# Patient Record
Sex: FEMALE | Race: WHITE | Employment: OTHER | URBAN - METROPOLITAN AREA
[De-identification: names, ages, dates, MRNs, and addresses within clinical notes are randomized per-mention and may not be internally consistent; named-entity substitution may affect disease eponyms.]

---

## 2017-06-03 ENCOUNTER — HOSPITAL ENCOUNTER (OUTPATIENT)
Dept: GENERAL RADIOLOGY | Age: 67
Discharge: HOME | End: 2017-06-03
Attending: INTERNAL MEDICINE | Admitting: INTERNAL MEDICINE

## 2017-06-03 DIAGNOSIS — R13.10 DYSPHAGIA, UNSPECIFIED TYPE: ICD-10-CM

## 2017-06-03 DIAGNOSIS — R10.84 ABDOMINAL PAIN, GENERALIZED: ICD-10-CM

## 2017-06-03 DIAGNOSIS — R13.14 DYSPHAGIA, PHARYNGOESOPHAGEAL PHASE: ICD-10-CM

## 2018-04-13 ENCOUNTER — HOSPITAL ENCOUNTER (OUTPATIENT)
Dept: ENDOSCOPY | Age: 68
Discharge: HOME | End: 2018-04-04
Attending: INTERNAL MEDICINE | Admitting: INTERNAL MEDICINE

## 2018-04-20 PROBLEM — I21.29 ACUTE MI, TRUE POSTERIOR WALL (HCC): Status: ACTIVE | Noted: 2018-04-20

## 2018-04-20 PROBLEM — I25.110 CORONARY ARTERY DISEASE INVOLVING NATIVE CORONARY ARTERY OF NATIVE HEART WITH UNSTABLE ANGINA PECTORIS (HCC): Status: ACTIVE | Noted: 2018-04-20

## 2018-04-20 PROBLEM — I21.29 POSTERIOR MI (HCC): Status: ACTIVE | Noted: 2018-04-20

## 2018-05-01 ENCOUNTER — OFFICE VISIT (OUTPATIENT)
Dept: CARDIOLOGY CLINIC | Age: 68
End: 2018-05-01

## 2018-05-01 VITALS
HEART RATE: 66 BPM | HEIGHT: 64 IN | WEIGHT: 135 LBS | DIASTOLIC BLOOD PRESSURE: 70 MMHG | SYSTOLIC BLOOD PRESSURE: 150 MMHG | BODY MASS INDEX: 23.05 KG/M2

## 2018-05-01 DIAGNOSIS — I10 ESSENTIAL HYPERTENSION: ICD-10-CM

## 2018-05-01 DIAGNOSIS — I25.111 CORONARY ARTERY DISEASE INVOLVING NATIVE CORONARY ARTERY OF NATIVE HEART WITH ANGINA PECTORIS WITH DOCUMENTED SPASM (HCC): Primary | ICD-10-CM

## 2018-05-01 DIAGNOSIS — I21.29: ICD-10-CM

## 2018-05-01 DIAGNOSIS — I25.110 CORONARY ARTERY DISEASE INVOLVING NATIVE CORONARY ARTERY OF NATIVE HEART WITH UNSTABLE ANGINA PECTORIS (HCC): ICD-10-CM

## 2018-05-01 PROCEDURE — 4040F PNEUMOC VAC/ADMIN/RCVD: CPT | Performed by: NURSE PRACTITIONER

## 2018-05-01 PROCEDURE — 1036F TOBACCO NON-USER: CPT | Performed by: NURSE PRACTITIONER

## 2018-05-01 PROCEDURE — 3017F COLORECTAL CA SCREEN DOC REV: CPT | Performed by: NURSE PRACTITIONER

## 2018-05-01 PROCEDURE — 1123F ACP DISCUSS/DSCN MKR DOCD: CPT | Performed by: NURSE PRACTITIONER

## 2018-05-01 PROCEDURE — 1111F DSCHRG MED/CURRENT MED MERGE: CPT | Performed by: NURSE PRACTITIONER

## 2018-05-01 PROCEDURE — G8427 DOCREV CUR MEDS BY ELIG CLIN: HCPCS | Performed by: NURSE PRACTITIONER

## 2018-05-01 PROCEDURE — G8598 ASA/ANTIPLAT THER USED: HCPCS | Performed by: NURSE PRACTITIONER

## 2018-05-01 PROCEDURE — G8420 CALC BMI NORM PARAMETERS: HCPCS | Performed by: NURSE PRACTITIONER

## 2018-05-01 PROCEDURE — 99214 OFFICE O/P EST MOD 30 MIN: CPT | Performed by: NURSE PRACTITIONER

## 2018-05-01 PROCEDURE — 1090F PRES/ABSN URINE INCON ASSESS: CPT | Performed by: NURSE PRACTITIONER

## 2018-05-01 PROCEDURE — G8399 PT W/DXA RESULTS DOCUMENT: HCPCS | Performed by: NURSE PRACTITIONER

## 2018-05-10 ENCOUNTER — HOSPITAL ENCOUNTER (OUTPATIENT)
Dept: NON INVASIVE DIAGNOSTICS | Age: 68
Discharge: HOME | End: 2018-05-10
Attending: NURSE PRACTITIONER | Admitting: NURSE PRACTITIONER

## 2018-05-10 DIAGNOSIS — I25.111 ATHEROSCLEROTIC HEART DISEASE OF NATIVE CORONARY ARTERY WITH ANGINA PECTORIS WITH DOCUMENTED SPASM (HCC): ICD-10-CM

## 2018-06-01 ENCOUNTER — HOSPITAL ENCOUNTER (OUTPATIENT)
Dept: OTHER | Age: 68
Discharge: HOME | End: 2018-06-30
Attending: INTERNAL MEDICINE | Admitting: INTERNAL MEDICINE

## 2018-06-06 ENCOUNTER — TELEPHONE (OUTPATIENT)
Dept: CARDIOLOGY CLINIC | Age: 68
End: 2018-06-06

## 2018-07-01 ENCOUNTER — HOSPITAL ENCOUNTER (OUTPATIENT)
Dept: OTHER | Age: 68
Discharge: HOME | End: 2018-07-31
Attending: INTERNAL MEDICINE | Admitting: INTERNAL MEDICINE

## 2018-08-01 ENCOUNTER — HOSPITAL ENCOUNTER (OUTPATIENT)
Dept: OTHER | Age: 68
Discharge: HOME | End: 2018-08-31
Attending: INTERNAL MEDICINE | Admitting: INTERNAL MEDICINE

## 2018-08-08 LAB
CREATININE, URINE: NORMAL
MICROALBUMIN/CREAT 24H UR: 7 MG/G{CREAT}
MICROALBUMIN/CREAT UR-RTO: NORMAL

## 2018-08-17 ENCOUNTER — OFFICE VISIT (OUTPATIENT)
Dept: CARDIOLOGY CLINIC | Age: 68
End: 2018-08-17

## 2018-08-17 VITALS
OXYGEN SATURATION: 98 % | RESPIRATION RATE: 14 BRPM | SYSTOLIC BLOOD PRESSURE: 136 MMHG | HEIGHT: 64 IN | DIASTOLIC BLOOD PRESSURE: 74 MMHG | HEART RATE: 76 BPM | WEIGHT: 134 LBS | BODY MASS INDEX: 22.88 KG/M2

## 2018-08-17 DIAGNOSIS — I10 ESSENTIAL HYPERTENSION: ICD-10-CM

## 2018-08-17 DIAGNOSIS — E78.49 OTHER HYPERLIPIDEMIA: ICD-10-CM

## 2018-08-17 DIAGNOSIS — I25.110 CORONARY ARTERY DISEASE INVOLVING NATIVE CORONARY ARTERY OF NATIVE HEART WITH UNSTABLE ANGINA PECTORIS (HCC): Primary | ICD-10-CM

## 2018-08-17 PROCEDURE — G8598 ASA/ANTIPLAT THER USED: HCPCS | Performed by: INTERNAL MEDICINE

## 2018-08-17 PROCEDURE — 1036F TOBACCO NON-USER: CPT | Performed by: INTERNAL MEDICINE

## 2018-08-17 PROCEDURE — G8427 DOCREV CUR MEDS BY ELIG CLIN: HCPCS | Performed by: INTERNAL MEDICINE

## 2018-08-17 PROCEDURE — 3017F COLORECTAL CA SCREEN DOC REV: CPT | Performed by: INTERNAL MEDICINE

## 2018-08-17 PROCEDURE — 1090F PRES/ABSN URINE INCON ASSESS: CPT | Performed by: INTERNAL MEDICINE

## 2018-08-17 PROCEDURE — 4040F PNEUMOC VAC/ADMIN/RCVD: CPT | Performed by: INTERNAL MEDICINE

## 2018-08-17 PROCEDURE — 1101F PT FALLS ASSESS-DOCD LE1/YR: CPT | Performed by: INTERNAL MEDICINE

## 2018-08-17 PROCEDURE — G8420 CALC BMI NORM PARAMETERS: HCPCS | Performed by: INTERNAL MEDICINE

## 2018-08-17 PROCEDURE — G8399 PT W/DXA RESULTS DOCUMENT: HCPCS | Performed by: INTERNAL MEDICINE

## 2018-08-17 PROCEDURE — 1123F ACP DISCUSS/DSCN MKR DOCD: CPT | Performed by: INTERNAL MEDICINE

## 2018-08-17 PROCEDURE — 99214 OFFICE O/P EST MOD 30 MIN: CPT | Performed by: INTERNAL MEDICINE

## 2018-08-17 RX ORDER — METFORMIN HYDROCHLORIDE 500 MG/1
1000 TABLET, EXTENDED RELEASE ORAL 2 TIMES DAILY
COMMUNITY
Start: 2018-07-10 | End: 2020-10-14 | Stop reason: SDUPTHER

## 2018-08-17 RX ORDER — LISINOPRIL 40 MG/1
TABLET ORAL
COMMUNITY
Start: 2018-08-08 | End: 2018-08-17 | Stop reason: SDUPTHER

## 2018-08-17 RX ORDER — LISINOPRIL 20 MG/1
20 TABLET ORAL 2 TIMES DAILY
Qty: 180 TABLET | Refills: 3 | Status: SHIPPED
Start: 2018-08-17 | End: 2020-08-07 | Stop reason: DRUGHIGH

## 2018-08-17 NOTE — PROGRESS NOTES
Lab Results   Component Value Date    HDL 35 (L) 04/21/2018    HDL 39 08/12/2013    HDL 49 09/27/2012     Lab Results   Component Value Date    LDLCALC see below 04/21/2018    [address] 09/27/2012    LDLCALC 70 09/27/2011     Lab Results   Component Value Date    LABVLDL see below 04/21/2018    LABVLDL 54 09/27/2012    LABVLDL 44 09/27/2011     Radiology Review:  Pertinent images / reports were reviewed as a part of this visit and reveals the following:           Plan:   Cardiac rehab referral  No change in medications  OV DR. Cuello 3 months  PL GXT end of next week        Further evaluation will be based upon the patient's clinical course and testing results. All questions and concerns were addressed to the patient/family. Alternatives to  treatment were discussed. The patient  currently  is not smoking. The risks related to smoking were reviewed with the patient. Recommend maintaining a smoke-free lifestyle. Products available for smoking cessation were discussed. Angiotension inhibitor/angiotension receptor blocker has __ been prescribed / recommended for congestive heart failure. Daily weight, low sodium diet were discussed. Patient instructed to call the office with a weight gain: > 3 # over night or 5# in one week; swelling, SOB/orthopnea/PND    Dual Antiplatelet therapy / anti-coagulation has been recommended / prescribed for this patient. Education conducted on adverse reactions including bleeding was discussed. The patient verbalizes understanding. Pt is not on a BB (she can NOT take bad dreams)  Pt is on an ace-i/ARB  Pt is on a statin      Saturated fat diet discussed  Exercise program discussed    Thank you for allowing to us to participate in the care of [address]  Documentation of today's visit sent to PCP

## 2018-08-17 NOTE — PROGRESS NOTES
[address]   Cardiac f/up    Referring Provider:  Noel Kirkpatrick     Chief Complaint   Patient presents with    3 Month Follow-Up    Coronary Artery Disease     Cardiac phase II 08/7/2018    Hypertension    Hyperlipidemia      History of Present Illness:  Mrs. Julio Baird is here today for follow up s/p acute posterior wall MI April 2018 (progressive exertional chest discomfort). LHC showed 100% OM1 which was stented. She is on Crestor for marked hyperlipidemia, has family history of premature heart disease. Today, she states she feels pretty well, getting her strength back slowly. She has occasional \"twinges\" in her chest that are brief, does not need to take NTG. She denies exertional chest pain, NULL/PND, palpitations, light-headedness, edema. She is active in [address] [place] [address], takes walks, does not smoke. Her  is with her for the visit.     Past Medical History:   has a past medical history of Allergic rhinitis; Fibromyalgia; GERD (gastroesophageal reflux disease); Herniated disc; Hyperlipidemia; Hypertension; and Type II or unspecified type diabetes mellitus without mention of complication, not stated as uncontrolled. Surgical History:   has no past surgical history on file. Social History:   reports that she has never smoked. She has never used smokeless tobacco. She reports that she does not drink alcohol or use drugs. Family History:  family history is not on file. Home Medications:  Prior to Admission medications    Medication Sig Start Date End Date Taking?  Authorizing Provider   metFORMIN (GLUCOPHAGE-XR) 500 MG extended release tablet  7/10/18  Yes Historical Provider, MD   lisinopril (PRINIVIL;ZESTRIL) 40 MG tablet  8/8/18  Yes Historical Provider, MD   aspirin 81 MG chewable tablet Take 1 tablet by mouth daily 4/22/18  Yes YAYA Arias CNP   rosuvastatin (CRESTOR) 10 MG tablet Take 1 tablet by mouth nightly 4/22/18  Yes YAYA Arias CNP nitroGLYCERIN (NITROSTAT) 0.4 MG SL tablet Place 1 tablet under the tongue every 5 minutes as needed for Chest pain 4/22/18  Yes YAYA Jackson CNP   clopidogrel (PLAVIX) 75 MG tablet Take 1 tablet by mouth daily 4/22/18  Yes YAYA Jackson CNP   diltiazem (TIAZAC) 360 MG SR capsule TAKE 1 CAPSULE BY MOUTH ONE TIME A DAY 12/24/13  Yes Mae Arriaga MD      Allergies:  Gill Clipper arthritis strength-antacid [aspirin buff, al hyd-mg hyd]; Aspirin; Atorvastatin; Erythromycin base; Macrolides and ketolides; Sulfa antibiotics; Troleandomycin; and Verapamil     Review of Systems:   · Constitutional: there has been no unanticipated weight loss. There's been no change in energy level, sleep pattern, or activity level. · Eyes: No visual changes or diplopia. No scleral icterus. · ENT: No Headaches, hearing loss or vertigo. No mouth sores or sore throat. · Cardiovascular: Reviewed in HPI  · Respiratory: No cough or wheezing, no sputum production. No hematemesis. · Gastrointestinal: No abdominal pain, appetite loss, blood in stools. No change in bowel or bladder habits. · Genitourinary: No dysuria, trouble voiding, or hematuria. · Musculoskeletal:  No gait disturbance, weakness or joint complaints. · Integumentary: No rash or pruritis. · Neurological: No headache, diplopia, change in muscle strength, numbness or tingling. No change in gait, balance, coordination, mood, affect, memory, mentation, behavior. · Psychiatric: No anxiety, no depression. · Endocrine: No malaise, fatigue or temperature intolerance. No excessive thirst, fluid intake, or urination. No tremor. · Hematologic/Lymphatic: No abnormal bruising or bleeding, blood clots or swollen lymph nodes. · Allergic/Immunologic: No nasal congestion or hives.     Physical Examination:    Vitals:    08/17/18 0824   BP: 136/74   Pulse: 76   Resp: 14   SpO2: 98%        Constitutional and General Appearance: Appears stated age, NAD Skin:good turgor,intact without lesions  HEENT: EOMI ,normal  Neck:no JVD    Respiratory:  · Normal excursion and expansion without use of accessory muscles  · Resp Auscultation: Normal breath sounds without dullness  Cardiovascular:  · The apical impulses not displaced  · Heart tones are crisp and normal  · Cervical veins are not engorged  · The carotid upstroke is normal in amplitude and contour without delay or bruit  · Peripheral pulses are symmetrical and full  · There is no clubbing, cyanosis of the extremities. · No edema  · Femoral Arteries: 2+ and equal  · Pedal Pulses: 2+ and equal   Abdomen:  · No masses or tenderness  · Liver/Spleen: No Abnormalities Noted  Neurological/Psychiatric:  · Alert and oriented in all spheres  · Moves all extremities well  · Exhibits normal gait balance and coordination  · No abnormalities of mood, affect, memory, mentation, or behavior are noted    Angiogram 4/20/18  Acute inferior posterior wall MI  Cath with 40-50% mid RCA,100% OM1,EF 50% with mild inferior wall hypokinesis. Trop 2.99.     PCI with 2.5 x 15 balloon then 2.5 x 18 manasa SHARIF stent to 16 hitesh with excellent angiographic result. Small distal branch with embolization. Will continue aaggrastat x 12 hours. Plavix given. She has been on crestor for lipid management,need to give in house     Assessment:     CAD (posterior wall MI April 2018)  [facility]  Attending ROSELYN, doing well  Family history of premature heart disease. Hypertension  Stable. Recently had Lisinopril doubled per PCP to 40mg qd. Would recommend taking it 20mg bid. Blood pressure 136/74, pulse 76, resp. rate 14, height 5' 4\" (1.626 m), weight 134 lb (60.8 kg), SpO2 98 %. Hyperlipidemia  Takes Crestor 10mg  4/21/18> , , HDL 35, LDL not calculated. Watch carbs/sugars. Carotid dopplers 6/14/18 (Care Everywhere)> less than 50% mary  Abd u/s 6/3/17> Negative for aneurysm          Plan:  Mrs. Stacey Bryson has a stable

## 2018-09-01 ENCOUNTER — HOSPITAL ENCOUNTER (OUTPATIENT)
Dept: OTHER | Age: 68
Discharge: HOME | End: 2018-09-01
Attending: INTERNAL MEDICINE | Admitting: INTERNAL MEDICINE

## 2019-01-08 RX ORDER — CLOPIDOGREL BISULFATE 75 MG/1
75 TABLET ORAL DAILY
Qty: 90 TABLET | Refills: 1 | Status: SHIPPED | OUTPATIENT
Start: 2019-01-08 | End: 2019-02-13 | Stop reason: SDUPTHER

## 2019-01-24 ENCOUNTER — TELEPHONE (OUTPATIENT)
Dept: CARDIOLOGY CLINIC | Age: 69
End: 2019-01-24

## 2019-01-24 ENCOUNTER — HOSPITAL ENCOUNTER (OUTPATIENT)
Age: 69
Discharge: HOME | End: 2019-01-24
Payer: MEDICARE

## 2019-01-24 ENCOUNTER — OFFICE VISIT (OUTPATIENT)
Dept: CARDIOLOGY CLINIC | Age: 69
End: 2019-01-24
Payer: MEDICARE

## 2019-01-24 VITALS
HEIGHT: 64 IN | BODY MASS INDEX: 23.39 KG/M2 | HEART RATE: 93 BPM | WEIGHT: 137 LBS | DIASTOLIC BLOOD PRESSURE: 74 MMHG | SYSTOLIC BLOOD PRESSURE: 140 MMHG

## 2019-01-24 DIAGNOSIS — I25.110 CORONARY ARTERY DISEASE INVOLVING NATIVE CORONARY ARTERY OF NATIVE HEART WITH UNSTABLE ANGINA PECTORIS (HCC): Primary | ICD-10-CM

## 2019-01-24 DIAGNOSIS — I25.110 CORONARY ARTERY DISEASE INVOLVING NATIVE CORONARY ARTERY OF NATIVE HEART WITH UNSTABLE ANGINA PECTORIS (HCC): ICD-10-CM

## 2019-01-24 DIAGNOSIS — I10 ESSENTIAL HYPERTENSION: ICD-10-CM

## 2019-01-24 DIAGNOSIS — E78.49 OTHER HYPERLIPIDEMIA: ICD-10-CM

## 2019-01-24 LAB — TROPONIN: <0.01 NG/ML

## 2019-01-24 PROCEDURE — 1036F TOBACCO NON-USER: CPT | Performed by: NURSE PRACTITIONER

## 2019-01-24 PROCEDURE — G8427 DOCREV CUR MEDS BY ELIG CLIN: HCPCS | Performed by: NURSE PRACTITIONER

## 2019-01-24 PROCEDURE — 93000 ELECTROCARDIOGRAM COMPLETE: CPT | Performed by: NURSE PRACTITIONER

## 2019-01-24 PROCEDURE — 1090F PRES/ABSN URINE INCON ASSESS: CPT | Performed by: NURSE PRACTITIONER

## 2019-01-24 PROCEDURE — G8420 CALC BMI NORM PARAMETERS: HCPCS | Performed by: NURSE PRACTITIONER

## 2019-01-24 PROCEDURE — G8484 FLU IMMUNIZE NO ADMIN: HCPCS | Performed by: NURSE PRACTITIONER

## 2019-01-24 PROCEDURE — G8399 PT W/DXA RESULTS DOCUMENT: HCPCS | Performed by: NURSE PRACTITIONER

## 2019-01-24 PROCEDURE — 1101F PT FALLS ASSESS-DOCD LE1/YR: CPT | Performed by: NURSE PRACTITIONER

## 2019-01-24 PROCEDURE — 84484 ASSAY OF TROPONIN QUANT: CPT

## 2019-01-24 PROCEDURE — 99214 OFFICE O/P EST MOD 30 MIN: CPT | Performed by: NURSE PRACTITIONER

## 2019-01-24 PROCEDURE — 4040F PNEUMOC VAC/ADMIN/RCVD: CPT | Performed by: NURSE PRACTITIONER

## 2019-01-24 PROCEDURE — 1123F ACP DISCUSS/DSCN MKR DOCD: CPT | Performed by: NURSE PRACTITIONER

## 2019-01-24 PROCEDURE — 3017F COLORECTAL CA SCREEN DOC REV: CPT | Performed by: NURSE PRACTITIONER

## 2019-01-24 PROCEDURE — 36415 COLL VENOUS BLD VENIPUNCTURE: CPT

## 2019-01-24 PROCEDURE — G8598 ASA/ANTIPLAT THER USED: HCPCS | Performed by: NURSE PRACTITIONER

## 2019-01-25 ENCOUNTER — TELEPHONE (OUTPATIENT)
Dept: CARDIOLOGY CLINIC | Age: 69
End: 2019-01-25

## 2019-02-13 ENCOUNTER — OFFICE VISIT (OUTPATIENT)
Dept: CARDIOLOGY CLINIC | Age: 69
End: 2019-02-13
Payer: MEDICARE

## 2019-02-13 VITALS
HEIGHT: 64 IN | SYSTOLIC BLOOD PRESSURE: 158 MMHG | BODY MASS INDEX: 23.56 KG/M2 | WEIGHT: 138 LBS | DIASTOLIC BLOOD PRESSURE: 82 MMHG | HEART RATE: 88 BPM

## 2019-02-13 DIAGNOSIS — I25.110 CORONARY ARTERY DISEASE INVOLVING NATIVE CORONARY ARTERY OF NATIVE HEART WITH UNSTABLE ANGINA PECTORIS (HCC): Primary | ICD-10-CM

## 2019-02-13 DIAGNOSIS — E78.49 OTHER HYPERLIPIDEMIA: ICD-10-CM

## 2019-02-13 DIAGNOSIS — I10 ESSENTIAL HYPERTENSION: ICD-10-CM

## 2019-02-13 DIAGNOSIS — E11.9 TYPE 2 DIABETES MELLITUS WITHOUT COMPLICATION, WITHOUT LONG-TERM CURRENT USE OF INSULIN (HCC): ICD-10-CM

## 2019-02-13 PROCEDURE — 1090F PRES/ABSN URINE INCON ASSESS: CPT | Performed by: INTERNAL MEDICINE

## 2019-02-13 PROCEDURE — 1123F ACP DISCUSS/DSCN MKR DOCD: CPT | Performed by: INTERNAL MEDICINE

## 2019-02-13 PROCEDURE — 4040F PNEUMOC VAC/ADMIN/RCVD: CPT | Performed by: INTERNAL MEDICINE

## 2019-02-13 PROCEDURE — 1036F TOBACCO NON-USER: CPT | Performed by: INTERNAL MEDICINE

## 2019-02-13 PROCEDURE — 2022F DILAT RTA XM EVC RTNOPTHY: CPT | Performed by: INTERNAL MEDICINE

## 2019-02-13 PROCEDURE — 1101F PT FALLS ASSESS-DOCD LE1/YR: CPT | Performed by: INTERNAL MEDICINE

## 2019-02-13 PROCEDURE — 3046F HEMOGLOBIN A1C LEVEL >9.0%: CPT | Performed by: INTERNAL MEDICINE

## 2019-02-13 PROCEDURE — G8598 ASA/ANTIPLAT THER USED: HCPCS | Performed by: INTERNAL MEDICINE

## 2019-02-13 PROCEDURE — 99214 OFFICE O/P EST MOD 30 MIN: CPT | Performed by: INTERNAL MEDICINE

## 2019-02-13 PROCEDURE — G8427 DOCREV CUR MEDS BY ELIG CLIN: HCPCS | Performed by: INTERNAL MEDICINE

## 2019-02-13 PROCEDURE — G8420 CALC BMI NORM PARAMETERS: HCPCS | Performed by: INTERNAL MEDICINE

## 2019-02-13 PROCEDURE — G8399 PT W/DXA RESULTS DOCUMENT: HCPCS | Performed by: INTERNAL MEDICINE

## 2019-02-13 PROCEDURE — 3017F COLORECTAL CA SCREEN DOC REV: CPT | Performed by: INTERNAL MEDICINE

## 2019-02-13 PROCEDURE — G8484 FLU IMMUNIZE NO ADMIN: HCPCS | Performed by: INTERNAL MEDICINE

## 2019-02-13 RX ORDER — CLOPIDOGREL BISULFATE 75 MG/1
75 TABLET ORAL DAILY
Qty: 90 TABLET | Refills: 3 | Status: SHIPPED | OUTPATIENT
Start: 2019-02-13 | End: 2020-03-10 | Stop reason: ALTCHOICE

## 2019-03-04 ENCOUNTER — HOSPITAL ENCOUNTER (OUTPATIENT)
Dept: NON INVASIVE DIAGNOSTICS | Age: 69
Discharge: HOME | End: 2019-03-04
Payer: MEDICARE

## 2019-03-04 DIAGNOSIS — I25.110 CORONARY ARTERY DISEASE INVOLVING NATIVE CORONARY ARTERY OF NATIVE HEART WITH UNSTABLE ANGINA PECTORIS (HCC): ICD-10-CM

## 2019-03-04 LAB
LV EF: 71 %
LVEF MODALITY: NORMAL

## 2019-03-04 PROCEDURE — 78452 HT MUSCLE IMAGE SPECT MULT: CPT | Performed by: INTERNAL MEDICINE

## 2019-03-04 PROCEDURE — 3430000000 HC RX DIAGNOSTIC RADIOPHARMACEUTICAL: Performed by: INTERNAL MEDICINE

## 2019-03-04 PROCEDURE — 93017 CV STRESS TEST TRACING ONLY: CPT | Performed by: INTERNAL MEDICINE

## 2019-03-04 PROCEDURE — A9502 TC99M TETROFOSMIN: HCPCS | Performed by: INTERNAL MEDICINE

## 2019-03-04 RX ADMIN — TETROFOSMIN 10 MILLICURIE: 0.23 INJECTION, POWDER, LYOPHILIZED, FOR SOLUTION INTRAVENOUS at 08:44

## 2019-03-04 RX ADMIN — TETROFOSMIN 30 MILLICURIE: 0.23 INJECTION, POWDER, LYOPHILIZED, FOR SOLUTION INTRAVENOUS at 10:05

## 2019-03-12 ENCOUNTER — TELEPHONE (OUTPATIENT)
Dept: CARDIOLOGY CLINIC | Age: 69
End: 2019-03-12

## 2019-04-15 ENCOUNTER — OFFICE VISIT (OUTPATIENT)
Dept: ENDOCRINOLOGY | Age: 69
End: 2019-04-15
Payer: MEDICARE

## 2019-04-15 VITALS
SYSTOLIC BLOOD PRESSURE: 133 MMHG | HEART RATE: 86 BPM | WEIGHT: 135.4 LBS | BODY MASS INDEX: 23.12 KG/M2 | DIASTOLIC BLOOD PRESSURE: 73 MMHG | HEIGHT: 64 IN

## 2019-04-15 DIAGNOSIS — E78.2 MIXED HYPERLIPIDEMIA: ICD-10-CM

## 2019-04-15 DIAGNOSIS — I25.110 CORONARY ARTERY DISEASE INVOLVING NATIVE CORONARY ARTERY OF NATIVE HEART WITH UNSTABLE ANGINA PECTORIS (HCC): ICD-10-CM

## 2019-04-15 DIAGNOSIS — I10 ESSENTIAL HYPERTENSION: ICD-10-CM

## 2019-04-15 PROCEDURE — 3017F COLORECTAL CA SCREEN DOC REV: CPT | Performed by: INTERNAL MEDICINE

## 2019-04-15 PROCEDURE — G8399 PT W/DXA RESULTS DOCUMENT: HCPCS | Performed by: INTERNAL MEDICINE

## 2019-04-15 PROCEDURE — 1036F TOBACCO NON-USER: CPT | Performed by: INTERNAL MEDICINE

## 2019-04-15 PROCEDURE — 3046F HEMOGLOBIN A1C LEVEL >9.0%: CPT | Performed by: INTERNAL MEDICINE

## 2019-04-15 PROCEDURE — G8420 CALC BMI NORM PARAMETERS: HCPCS | Performed by: INTERNAL MEDICINE

## 2019-04-15 PROCEDURE — 2022F DILAT RTA XM EVC RTNOPTHY: CPT | Performed by: INTERNAL MEDICINE

## 2019-04-15 PROCEDURE — 1123F ACP DISCUSS/DSCN MKR DOCD: CPT | Performed by: INTERNAL MEDICINE

## 2019-04-15 PROCEDURE — G8427 DOCREV CUR MEDS BY ELIG CLIN: HCPCS | Performed by: INTERNAL MEDICINE

## 2019-04-15 PROCEDURE — 4040F PNEUMOC VAC/ADMIN/RCVD: CPT | Performed by: INTERNAL MEDICINE

## 2019-04-15 PROCEDURE — 1090F PRES/ABSN URINE INCON ASSESS: CPT | Performed by: INTERNAL MEDICINE

## 2019-04-15 PROCEDURE — G8598 ASA/ANTIPLAT THER USED: HCPCS | Performed by: INTERNAL MEDICINE

## 2019-04-15 PROCEDURE — 99204 OFFICE O/P NEW MOD 45 MIN: CPT | Performed by: INTERNAL MEDICINE

## 2019-04-15 NOTE — PROGRESS NOTES
Bhaskar Valencia is a 70 y.o. female who presents for Type 2 diabetes mellitus. Current symptoms/problems include hyperglycemia and are worsening. 1.  DM type 2, uncontrolled, with neuropathy ([facility] [address] [E11.40, E11.65]    Diagnosed with Type 2 diabetes mellitus in 2009.     Comorbid conditions: hyperlipidemia, Neuropathy and Coronary Artery Disease    Current diabetic medications include: metformin ER, has diarrhea, but takes it    Intolerance to diabetes medications: No     Weight trend: stable  Prior visit with dietician: yes  Current diet: on average, 3 meals per day  Current exercise: walking     Current monitoring regimen: home blood tests - 3 times daily  Has brought blood glucose log/meter:  No  Home blood sugar records: fasting range:  and postprandial range: over 200   Any episodes of hypoglycemia? No  Hypoglycemia frequency and time(s):    Does patient have Glucagon emergency kit? No  Does patient have rapid acting carbohydrate? No  Does patient wear a medic alert bracelet or necklace? No    2. Coronary artery disease involving native coronary artery of native heart with unstable angina pectoris (HCC)  No chest pain    3. Essential hypertension  Has headaches.     4. Mixed hyperlipidemia  Has muscle pain, cramping      Past Medical History:   Diagnosis Date    Allergic rhinitis     Fibromyalgia     GERD (gastroesophageal reflux disease)     Heart attack ([facility] [address]     Heart disease     Herniated disc     Hyperlipidemia     Hypertension     Type II or unspecified type diabetes mellitus without mention of complication, not stated as uncontrolled       Patient Active Problem List   Diagnosis    DM type 2, uncontrolled, with neuropathy ([facility] [address]    Hyperlipidemia    HTN (hypertension)    GERD (gastroesophageal reflux disease)    Coronary artery disease involving native coronary artery of native heart with unstable angina pectoris ([facility] [address]    Acute MI, true posterior wall ([address]    ST 04/20/2018     Lab Results   Component Value Date    TSH 1.06 04/29/2013     Lab Results   Component Value Date    LABA1C 7.1 04/21/2018     Lab Results   Component Value Date    .1 04/21/2018     Lab Results   Component Value Date    CHOL 191 04/21/2018     Lab Results   Component Value Date    TRIG 484 04/21/2018     Lab Results   Component Value Date    HDL 35 04/21/2018    HDL 49 09/27/2011     Lab Results   Component Value Date    LDLCALC see below 04/21/2018     Lab Results   Component Value Date    LABVLDL see below 04/21/2018     No results found for: [facility]  Lab Results   Component Value Date    LABMICR YES 04/20/2018     Lab Results   Component Value Date    VITD25 20.0 08/12/2013        Review of Systems:  Constitutional: has fatigue, no fever, no recent weight gain, no recent weight loss, no changes in appetite  Eyes: no eye pain, has change in vision, no eye redness, no eye irritation, no double vision  Ears, nose, throat: has nasal congestion, no sore throat, no earache, has decrease in hearing, no hoarseness, no dry mouth, has sinus problems, has difficulty swallowing, no neck lumps, no no mouth sores, has ringing in ears  Pulmonary: no shortness of breath, no wheezing, no dyspnea on exertion, no cough  Cardiovascular: no chest pain, no lower extremity edema, no orthopnea, no intermittent leg claudication, no palpitations  Gastrointestinal: has upper abdominal pain, no nausea, no vomiting, no diarrhea, no constipation, has dysphagia, no heartburn, no bloating  Genitourinary: no dysuria, no urinary incontinence, no urinary hesitancy, has urinary frequency, has feelings of urinary urgency, has nocturia  Musculoskeletal: no joint swelling, has joint stiffness, has joint pain, no muscle cramps, has muscle pain  Integument/Breast: has hair loss, no skin rashes, no skin lesions, has dry skin  Neurological: no numbness, no tingling, has weakness, no confusion, has headaches, has dizziness, no fainting, no tremors, no decrease in memory, no balance problems  Psychiatric: no anxiety, no depression, no insomnia  Hematologic/Lymphatic: no tendency for easy bleeding, no swollen lymph nodes, has tendency for easy bruising  Immunology: has seasonal allergies, no frequent infections, no frequent illnesses  Endocrine: no temperature intolerance    /73 (Site: Left Upper Arm, Position: Sitting, Cuff Size: Medium Adult)   Pulse 86   Ht 5' 4\" (1.626 m)   Wt 135 lb 6.4 oz (61.4 kg)   BMI 23.24 kg/m²    Wt Readings from Last 3 Encounters:   04/15/19 135 lb 6.4 oz (61.4 kg)   02/13/19 138 lb (62.6 kg)   01/24/19 137 lb (62.1 kg)     Body mass index is 23.24 kg/m².       OBJECTIVE:  Constitutional: no acute distress, well appearing and well nourished  Psychiatric: oriented to person, place and time, judgement and insight and normal, recent and remote memory and intact and mood and affect are normal  Skin: skin and subcutaneous tissue is normal without mass, normal turgor  Head and Face: examination of head and face revealed no abnormalities  Eyes: no lid or conjunctival swelling, erythema or discharge, pupils are normal, equal, round, reactive to light  Ears/Nose: external inspection of ears and nose revealed no abnormalities, hearing is grossly normal  Oropharynx/Mouth/Face: lips, tongue and gums are normal with no lesions, the voice quality was normal  Neck: neck is supple and symmetric, with midline trachea and no masses, thyroid is normal  Lymphatics: normal cervical lymph nodes, normal supraclavicular nodes  Pulmonary: no increased work of breathing or signs of respiratory distress, lungs are clear to auscultation  Cardiovascular: normal heart rate and rhythm, normal S1 and S2, no murmurs and pedal pulses and 2+ bilaterally, No edema  Abdomen: abdomen is soft, with no masses, slight tenderness in epigastric area  Musculoskeletal: normal gait and station and exam of the digits and nails are normal  Neurological: normal coordination and normal general cortical function    Visual inspection:  Deformity/amputation: absent  Skin lesions/pre-ulcerative calluses: absent  Edema: right- negative, left- negative    Sensory exam:  Monofilament sensation: normal  (minimum of 5 random plantar locations tested, avoiding callused areas - > 1 area with absence of sensation is + for neuropathy)    Plus at least one of the following:  Pulses: normal  Proprioception: Intact  Vibration (128 Hz): Impaired    ASSESSMENT/PLAN:  1. DM type 2, uncontrolled, with neuropathy (HCC)  Has diarrhea on metfomin, will follow. Start Januvia 100 mg.   - Comprehensive Metabolic Panel; Future  - C-Peptide; Future  -  DIABETES FOOT EXAM  - Hemoglobin A1C; Future  - Lipid Panel; Future  - Microalbumin / Creatinine Urine Ratio; Future    2. Coronary artery disease involving native coronary artery of native heart with unstable angina pectoris [facility])  Follow with Cardiology. 3. Essential hypertension  Continue current medications. 4. Mixed hyperlipidemia  Continue rosuvastatin  - Comprehensive Metabolic Panel; Future  - C-Peptide; Future  -  DIABETES FOOT EXAM  - Hemoglobin A1C; Future  - Lipid Panel; Future  - Microalbumin / Creatinine Urine Ratio;  Future      Reviewed and/or ordered clinical lab results Yes  Reviewed and/or ordered radiology tests No  Reviewed and/or ordered other diagnostic tests No  Discussed test results with performing physician No  Independently reviewed image, tracing, or specimen No  Made a decision to obtain old records No  Reviewed and summarized old records Yes  HbA1C 7.3  Obtained history from other than patient No    Negrita Wadsworth was counseled regarding symptoms of diabetes diagnosis, course and complications of disease if inadequately treated, side effects of medications, diagnosis, treatment options, and prognosis, risks, benefits, complications, and alternatives of treatment, labs, imaging and other studies and treatment targets and goals, Januvia, diabetes pathophysiology, basal and prandial insulin requirements. She understands instructions and counseling. Return in about 1 month (around 5/15/2019) for diabetes, 40 min.

## 2019-08-14 ENCOUNTER — TELEPHONE (OUTPATIENT)
Dept: CARDIOLOGY CLINIC | Age: 69
End: 2019-08-14

## 2019-08-14 NOTE — PROGRESS NOTES
Medication Sig Start Date End Date Taking? Authorizing Provider   nitroGLYCERIN (NITROSTAT) 0.4 MG SL tablet Place 1 tablet under the tongue every 5 minutes as needed for Chest pain 8/16/19  Yes Vik Gu MD   SITagliptin (JANUVIA) 100 MG tablet Take 1 tablet by mouth daily  Patient taking differently: Take 50 mg by mouth daily  4/15/19  Yes Rachell Contreras MD   clopidogrel (PLAVIX) 75 MG tablet Take 1 tablet by mouth daily 2/13/19  Yes Vik Gu MD   metFORMIN (GLUCOPHAGE-XR) 500 MG extended release tablet Take 1,000 mg by mouth 2 times daily  7/10/18  Yes Historical Provider, MD   lisinopril (PRINIVIL;ZESTRIL) 20 MG tablet Take 1 tablet by mouth 2 times daily  Patient taking differently: Take 40 mg by mouth daily  8/17/18  Yes Vik Gu MD   aspirin 81 MG chewable tablet Take 1 tablet by mouth daily 4/22/18  Yes YAYA Vicente CNP   rosuvastatin (CRESTOR) 10 MG tablet Take 1 tablet by mouth nightly 4/22/18  Yes YAYA Vicente CNP   diltiazem (TIAZAC) 360 MG SR capsule TAKE 1 CAPSULE BY MOUTH ONE TIME A DAY 12/24/13  Yes Rhiannon El MD      Allergies:  Erin Gamma arthritis strength-antacid [aspirin buff, al hyd-mg hyd]; Aspirin; Atorvastatin; Erythromycin base; Macrolides and ketolides; Sulfa antibiotics; Troleandomycin; and Verapamil     Review of Systems:   · Constitutional: there has been no unanticipated weight loss. There's been no change in energy level, sleep pattern, or activity level. · Eyes: No visual changes or diplopia. No scleral icterus. · ENT: No Headaches, hearing loss or vertigo. No mouth sores or sore throat. · Cardiovascular: Reviewed in HPI  · Respiratory: No cough or wheezing, no sputum production. No hematemesis. · Gastrointestinal: No abdominal pain, appetite loss, blood in stools. No change in bowel or bladder habits. · Genitourinary: No dysuria, trouble voiding, or hematuria.   · Musculoskeletal:  No gait disturbance, weakness or joint

## 2019-08-16 ENCOUNTER — OFFICE VISIT (OUTPATIENT)
Dept: CARDIOLOGY CLINIC | Age: 69
End: 2019-08-16
Payer: MEDICARE

## 2019-08-16 VITALS
SYSTOLIC BLOOD PRESSURE: 120 MMHG | HEART RATE: 82 BPM | BODY MASS INDEX: 23.22 KG/M2 | WEIGHT: 136 LBS | DIASTOLIC BLOOD PRESSURE: 70 MMHG | OXYGEN SATURATION: 97 % | HEIGHT: 64 IN

## 2019-08-16 DIAGNOSIS — R53.83 FATIGUE, UNSPECIFIED TYPE: Primary | ICD-10-CM

## 2019-08-16 DIAGNOSIS — E78.2 MIXED HYPERLIPIDEMIA: ICD-10-CM

## 2019-08-16 DIAGNOSIS — I10 ESSENTIAL HYPERTENSION: ICD-10-CM

## 2019-08-16 DIAGNOSIS — I25.110 CORONARY ARTERY DISEASE INVOLVING NATIVE CORONARY ARTERY OF NATIVE HEART WITH UNSTABLE ANGINA PECTORIS (HCC): ICD-10-CM

## 2019-08-16 DIAGNOSIS — R00.2 PALPITATIONS: ICD-10-CM

## 2019-08-16 DIAGNOSIS — R00.2 HEART PALPITATIONS: ICD-10-CM

## 2019-08-16 PROCEDURE — G8420 CALC BMI NORM PARAMETERS: HCPCS | Performed by: INTERNAL MEDICINE

## 2019-08-16 PROCEDURE — 4040F PNEUMOC VAC/ADMIN/RCVD: CPT | Performed by: INTERNAL MEDICINE

## 2019-08-16 PROCEDURE — G8598 ASA/ANTIPLAT THER USED: HCPCS | Performed by: INTERNAL MEDICINE

## 2019-08-16 PROCEDURE — 99214 OFFICE O/P EST MOD 30 MIN: CPT | Performed by: INTERNAL MEDICINE

## 2019-08-16 PROCEDURE — G8427 DOCREV CUR MEDS BY ELIG CLIN: HCPCS | Performed by: INTERNAL MEDICINE

## 2019-08-16 PROCEDURE — 1090F PRES/ABSN URINE INCON ASSESS: CPT | Performed by: INTERNAL MEDICINE

## 2019-08-16 PROCEDURE — 1036F TOBACCO NON-USER: CPT | Performed by: INTERNAL MEDICINE

## 2019-08-16 PROCEDURE — 93000 ELECTROCARDIOGRAM COMPLETE: CPT | Performed by: INTERNAL MEDICINE

## 2019-08-16 PROCEDURE — 1123F ACP DISCUSS/DSCN MKR DOCD: CPT | Performed by: INTERNAL MEDICINE

## 2019-08-16 PROCEDURE — G8399 PT W/DXA RESULTS DOCUMENT: HCPCS | Performed by: INTERNAL MEDICINE

## 2019-08-16 PROCEDURE — 3017F COLORECTAL CA SCREEN DOC REV: CPT | Performed by: INTERNAL MEDICINE

## 2019-08-16 RX ORDER — NITROGLYCERIN 0.4 MG/1
0.4 TABLET SUBLINGUAL EVERY 5 MIN PRN
Qty: 25 TABLET | Refills: 2 | Status: SHIPPED | OUTPATIENT
Start: 2019-08-16 | End: 2022-04-12 | Stop reason: SDUPTHER

## 2019-08-19 ENCOUNTER — TELEPHONE (OUTPATIENT)
Dept: CARDIOLOGY CLINIC | Age: 69
End: 2019-08-19

## 2019-08-19 NOTE — TELEPHONE ENCOUNTER
Pt calling, she is having problems with the event monitor and she went to change the patches and they have broke her out and she doesn't want to put the monitor back on. She is asking if there is any other way to monitor but if not she doesn't want to continue. She is wanting to call the company to tell them she is sending it back. Please call her asap. Thank you.

## 2019-08-19 NOTE — TELEPHONE ENCOUNTER
Spoke with pt she states the patch gave her a rash and a small knot. I suggested the monitor they provide in the box that hangs around her neck but she said she did not want to wear anything else from that company, but later stated she would check out that monitor. In the meantime the pt wants to know if you have any other suggestions?

## 2019-08-20 ENCOUNTER — HOSPITAL ENCOUNTER (OUTPATIENT)
Age: 69
Discharge: HOME | End: 2019-08-20
Payer: MEDICARE

## 2019-08-20 DIAGNOSIS — I25.110 CORONARY ARTERY DISEASE INVOLVING NATIVE CORONARY ARTERY OF NATIVE HEART WITH UNSTABLE ANGINA PECTORIS (HCC): ICD-10-CM

## 2019-08-20 LAB
ALT SERPL-CCNC: 15 U/L (ref 10–40)
ANION GAP SERPL CALCULATED.3IONS-SCNC: 12 MMOL/L (ref 3–16)
AST SERPL-CCNC: 15 U/L (ref 15–37)
BASOPHILS ABSOLUTE: 0 K/UL (ref 0–0.2)
BASOPHILS RELATIVE PERCENT: 0.7 %
BUN BLDV-MCNC: 10 MG/DL (ref 7–20)
CALCIUM SERPL-MCNC: 9.5 MG/DL (ref 8.3–10.6)
CHLORIDE BLD-SCNC: 100 MMOL/L (ref 99–110)
CHOLESTEROL, TOTAL: 136 MG/DL (ref 0–199)
CO2: 26 MMOL/L (ref 21–32)
CREAT SERPL-MCNC: 0.5 MG/DL (ref 0.6–1.2)
EOSINOPHILS ABSOLUTE: 0.1 K/UL (ref 0–0.6)
EOSINOPHILS RELATIVE PERCENT: 1.4 %
GFR AFRICAN AMERICAN: >60
GFR NON-AFRICAN AMERICAN: >60
GLUCOSE BLD-MCNC: 148 MG/DL (ref 70–99)
HCT VFR BLD CALC: 38.7 % (ref 36–48)
HDLC SERPL-MCNC: 44 MG/DL (ref 40–60)
HEMOGLOBIN: 13.1 G/DL (ref 12–16)
LDL CHOLESTEROL CALCULATED: 66 MG/DL
LYMPHOCYTES ABSOLUTE: 1.1 K/UL (ref 1–5.1)
LYMPHOCYTES RELATIVE PERCENT: 25.2 %
MCH RBC QN AUTO: 31.2 PG (ref 26–34)
MCHC RBC AUTO-ENTMCNC: 33.9 G/DL (ref 31–36)
MCV RBC AUTO: 91.9 FL (ref 80–100)
MONOCYTES ABSOLUTE: 0.3 K/UL (ref 0–1.3)
MONOCYTES RELATIVE PERCENT: 6.3 %
NEUTROPHILS ABSOLUTE: 2.8 K/UL (ref 1.7–7.7)
NEUTROPHILS RELATIVE PERCENT: 66.4 %
PDW BLD-RTO: 12.5 % (ref 12.4–15.4)
PLATELET # BLD: 179 K/UL (ref 135–450)
PMV BLD AUTO: 8.8 FL (ref 5–10.5)
POTASSIUM SERPL-SCNC: 4.5 MMOL/L (ref 3.5–5.1)
RBC # BLD: 4.21 M/UL (ref 4–5.2)
SODIUM BLD-SCNC: 138 MMOL/L (ref 136–145)
TRIGL SERPL-MCNC: 130 MG/DL (ref 0–150)
VLDLC SERPL CALC-MCNC: 26 MG/DL
WBC # BLD: 4.2 K/UL (ref 4–11)

## 2019-08-20 PROCEDURE — 36415 COLL VENOUS BLD VENIPUNCTURE: CPT

## 2019-08-20 PROCEDURE — 84460 ALANINE AMINO (ALT) (SGPT): CPT

## 2019-08-20 PROCEDURE — 80061 LIPID PANEL: CPT

## 2019-08-20 PROCEDURE — 85025 COMPLETE CBC W/AUTO DIFF WBC: CPT

## 2019-08-20 PROCEDURE — 80048 BASIC METABOLIC PNL TOTAL CA: CPT

## 2019-08-20 PROCEDURE — 84450 TRANSFERASE (AST) (SGOT): CPT

## 2019-08-26 ENCOUNTER — TELEPHONE (OUTPATIENT)
Dept: CARDIOLOGY CLINIC | Age: 69
End: 2019-08-26

## 2019-09-20 PROCEDURE — 93228 REMOTE 30 DAY ECG REV/REPORT: CPT | Performed by: INTERNAL MEDICINE

## 2019-09-24 ENCOUNTER — TELEPHONE (OUTPATIENT)
Dept: CARDIOLOGY CLINIC | Age: 69
End: 2019-09-24

## 2020-03-02 PROBLEM — I25.10 CORONARY ARTERY DISEASE INVOLVING NATIVE CORONARY ARTERY OF NATIVE HEART WITHOUT ANGINA PECTORIS: Status: ACTIVE | Noted: 2018-04-20

## 2020-03-02 NOTE — PROGRESS NOTES
[facility]   Cardiac f/up    Referring Provider:  Katty Raza     Chief Complaint   Patient presents with    6 Month Follow-Up     No Complaints     Coronary Artery Disease    Hypertension      History of Present Illness:  Mrs. Herrera Perera is s/p acute posterior wall MI April 2018 (progressive exertional chest discomfort). LHC showed 100% OM1 which was stented. She is on Crestor for marked hyperlipidemia, has family history of premature heart disease. Today, she reports she has been ok. She has had some jaw pain, was diagnosed with TMJ. She reports some improvement in symptoms. She states she has some stomach upset after taking Aspirin. Recommend her to switch to enteric coated. Her  is with her for the visit. Has no shortness of breath,dizziness,syncope,edema.     Past Medical History:   has a past medical history of Allergic rhinitis, Fibromyalgia, GERD (gastroesophageal reflux disease), Heart attack ([address] Heart disease, Herniated disc, Hyperlipidemia, Hypertension, and Type II or unspecified type diabetes mellitus without mention of complication, not stated as uncontrolled. Surgical History:   has a past surgical history that includes Coronary angioplasty with stent. Social History:   reports that she has never smoked. She has never used smokeless tobacco. She reports that she does not drink alcohol or use drugs. Family History:  family history includes Cancer in her father; Diabetes in her sister; Heart Disease in her mother; Thyroid Disease in her daughter. Home Medications:  Prior to Admission medications    Medication Sig Start Date End Date Taking?  Authorizing Provider   nitroGLYCERIN (NITROSTAT) 0.4 MG SL tablet Place 1 tablet under the tongue every 5 minutes as needed for Chest pain 8/16/19  Yes Prahbjot Rowell MD   SITagliptin (JANUVIA) 100 MG tablet Take 1 tablet by mouth daily  Patient taking differently: Take 50 mg by mouth daily  4/15/19  Yes Juanita Mane Fausto Le MD   clopidogrel (PLAVIX) 75 MG tablet Take 1 tablet by mouth daily 2/13/19  Yes Fer Schultz MD   metFORMIN (GLUCOPHAGE-XR) 500 MG extended release tablet Take 1,000 mg by mouth 2 times daily  7/10/18  Yes Historical Provider, MD   lisinopril (PRINIVIL;ZESTRIL) 20 MG tablet Take 1 tablet by mouth 2 times daily  Patient taking differently: Take 40 mg by mouth daily  8/17/18  Yes Fer Schultz MD   aspirin 81 MG chewable tablet Take 1 tablet by mouth daily 4/22/18  Yes YAYA Villanueva CNP   rosuvastatin (CRESTOR) 10 MG tablet Take 1 tablet by mouth nightly 4/22/18  Yes YAYA Villanueva CNP   diltiazem (TIAZAC) 360 MG SR capsule TAKE 1 CAPSULE BY MOUTH ONE TIME A DAY 12/24/13  Yes Norma Van MD      Allergies:  [place] arthritis strength-antacid [aspirin buff, al hyd-mg hyd]; Aspirin; Atorvastatin; Erythromycin base; Macrolides and ketolides; Sulfa antibiotics; Troleandomycin; and Verapamil     Review of Systems:   · Constitutional: there has been no unanticipated weight loss. There's been no change in energy level, sleep pattern, or activity level. · Eyes: No visual changes or diplopia. No scleral icterus. · ENT: No Headaches, hearing loss or vertigo. No mouth sores or sore throat. · Cardiovascular: Reviewed in HPI  · Respiratory: No cough or wheezing, no sputum production. No hematemesis. · Gastrointestinal: No abdominal pain, appetite loss, blood in stools. No change in bowel or bladder habits. · Genitourinary: No dysuria, trouble voiding, or hematuria. · Musculoskeletal:  No gait disturbance, weakness or joint complaints. · Integumentary: No rash or pruritis. · Neurological: No headache, diplopia, change in muscle strength, numbness or tingling. No change in gait, balance, coordination, mood, affect, memory, mentation, behavior. · Psychiatric: No anxiety, no depression. · Endocrine: No malaise, fatigue or temperature intolerance.  No excessive thirst, fluid intake, or urination. No tremor. · Hematologic/Lymphatic: No abnormal bruising or bleeding, blood clots or swollen lymph nodes. · Allergic/Immunologic: No nasal congestion or hives. Physical Examination:    Vitals:    03/10/20 1309   BP: 132/74   Pulse: 101   Resp: 18   SpO2: 96%        Constitutional and General Appearance: Appears stated age, NAD   Skin:good turgor,intact without lesions  HEENT: EOMI ,normal  Neck:no JVD    Respiratory:  · Normal excursion and expansion without use of accessory muscles  · Resp Auscultation: Normal breath sounds without dullness  Cardiovascular:  · The apical impulses not displaced  · Heart tones are crisp and normal  · Cervical veins are not engorged  · The carotid upstroke is normal in amplitude and contour without delay or bruit  · Peripheral pulses are symmetrical and full  · There is no clubbing, cyanosis of the extremities. · No edema  · Femoral Arteries: 2+ and equal  · Pedal Pulses: 2+ and equal   Abdomen:  · No masses or tenderness  · Liver/Spleen: No Abnormalities Noted  Neurological/Psychiatric:  · Alert and oriented in all spheres  · Moves all extremities well  · Exhibits normal gait balance and coordination  · No abnormalities of mood, affect, memory, mentation, or behavior are noted    Angiogram 4/20/18  Acute inferior posterior wall MI  Cath with 40-50% mid RCA,100% OM1,EF 50% with mild inferior wall hypokinesis. Trop 2.99.     PCI with 2.5 x 15 balloon then 2.5 x 18 manasa SHARIF stent to 16 hitesh with excellent angiographic result. Small distal branch with embolization. Will continue aaggrastat x 12 hours. Plavix given. She has been on crestor for lipid management,need to give in house  ECG-8/16/19 for chest pain  with borderline short CO interval otherwise normal-done for chest pain  Assessment:     CAD (posterior wall MI April 2018)  [facility] 4/20/18> SHARIF to [address]  Family history of premature heart disease. Stable, no anginal symptoms. Hypertension  Stable.    Blood

## 2020-03-10 ENCOUNTER — OFFICE VISIT (OUTPATIENT)
Dept: CARDIOLOGY CLINIC | Age: 70
End: 2020-03-10
Payer: MEDICARE

## 2020-03-10 VITALS
WEIGHT: 139.9 LBS | RESPIRATION RATE: 18 BRPM | SYSTOLIC BLOOD PRESSURE: 132 MMHG | HEART RATE: 101 BPM | OXYGEN SATURATION: 96 % | DIASTOLIC BLOOD PRESSURE: 74 MMHG | HEIGHT: 64 IN | BODY MASS INDEX: 23.88 KG/M2

## 2020-03-10 PROCEDURE — 1090F PRES/ABSN URINE INCON ASSESS: CPT | Performed by: INTERNAL MEDICINE

## 2020-03-10 PROCEDURE — 4040F PNEUMOC VAC/ADMIN/RCVD: CPT | Performed by: INTERNAL MEDICINE

## 2020-03-10 PROCEDURE — 99214 OFFICE O/P EST MOD 30 MIN: CPT | Performed by: INTERNAL MEDICINE

## 2020-03-10 PROCEDURE — G8427 DOCREV CUR MEDS BY ELIG CLIN: HCPCS | Performed by: INTERNAL MEDICINE

## 2020-03-10 PROCEDURE — 1036F TOBACCO NON-USER: CPT | Performed by: INTERNAL MEDICINE

## 2020-03-10 PROCEDURE — G8484 FLU IMMUNIZE NO ADMIN: HCPCS | Performed by: INTERNAL MEDICINE

## 2020-03-10 PROCEDURE — G8420 CALC BMI NORM PARAMETERS: HCPCS | Performed by: INTERNAL MEDICINE

## 2020-03-10 PROCEDURE — 3017F COLORECTAL CA SCREEN DOC REV: CPT | Performed by: INTERNAL MEDICINE

## 2020-03-10 PROCEDURE — G8399 PT W/DXA RESULTS DOCUMENT: HCPCS | Performed by: INTERNAL MEDICINE

## 2020-03-10 PROCEDURE — 1123F ACP DISCUSS/DSCN MKR DOCD: CPT | Performed by: INTERNAL MEDICINE

## 2020-03-19 RX ORDER — CLOPIDOGREL BISULFATE 75 MG/1
TABLET ORAL
Qty: 90 TABLET | Refills: 3 | OUTPATIENT
Start: 2020-03-19

## 2020-05-21 ENCOUNTER — TELEPHONE (OUTPATIENT)
Dept: INTERNAL MEDICINE CLINIC | Age: 70
End: 2020-05-21

## 2020-07-06 ENCOUNTER — TELEPHONE (OUTPATIENT)
Dept: INTERNAL MEDICINE CLINIC | Age: 70
End: 2020-07-06

## 2020-07-06 NOTE — TELEPHONE ENCOUNTER
Reviewed Feb note from previous PCP. Labs ordered. I don't see where she has had any labs done since 2018. Please make sure to get them done prior to August appointment.

## 2020-07-06 NOTE — TELEPHONE ENCOUNTER
Pt needs some blood work done do to heart attack and diabetic  Pt had appt for today and when she arrived she was told it had been canceled pt said no one had left any msg reg this and she really needs this blood work to see how things are going with the new med she on .  Please call pt to further advise and see if labs can be order for now bc she not rs till aug

## 2020-07-06 NOTE — TELEPHONE ENCOUNTER
Did try the pt several times. When she arrived today she did tell the  that she had a new phone and was having issues with it.

## 2020-08-04 LAB
A/G RATIO: 2 (ref 1.1–2.2)
ALBUMIN SERPL-MCNC: 4.7 G/DL (ref 3.4–5)
ALP BLD-CCNC: 66 U/L (ref 40–129)
ALT SERPL-CCNC: 18 U/L (ref 10–40)
ANION GAP SERPL CALCULATED.3IONS-SCNC: 14 MMOL/L (ref 3–16)
AST SERPL-CCNC: 18 U/L (ref 15–37)
BILIRUB SERPL-MCNC: 0.3 MG/DL (ref 0–1)
BUN BLDV-MCNC: 13 MG/DL (ref 7–20)
CALCIUM SERPL-MCNC: 9.3 MG/DL (ref 8.3–10.6)
CHLORIDE BLD-SCNC: 101 MMOL/L (ref 99–110)
CHOLESTEROL, TOTAL: 144 MG/DL (ref 0–199)
CO2: 25 MMOL/L (ref 21–32)
CREAT SERPL-MCNC: <0.5 MG/DL (ref 0.6–1.2)
GFR AFRICAN AMERICAN: >60
GFR NON-AFRICAN AMERICAN: >60
GLOBULIN: 2.3 G/DL
GLUCOSE BLD-MCNC: 160 MG/DL (ref 70–99)
HDLC SERPL-MCNC: 47 MG/DL (ref 40–60)
LDL CHOLESTEROL CALCULATED: 66 MG/DL
POTASSIUM SERPL-SCNC: 4.1 MMOL/L (ref 3.5–5.1)
SODIUM BLD-SCNC: 140 MMOL/L (ref 136–145)
TOTAL PROTEIN: 7 G/DL (ref 6.4–8.2)
TRIGL SERPL-MCNC: 157 MG/DL (ref 0–150)
VLDLC SERPL CALC-MCNC: 31 MG/DL

## 2020-08-05 LAB
ESTIMATED AVERAGE GLUCOSE: 159.9 MG/DL
HBA1C MFR BLD: 7.2 %

## 2020-08-07 ENCOUNTER — OFFICE VISIT (OUTPATIENT)
Dept: INTERNAL MEDICINE CLINIC | Age: 70
End: 2020-08-07
Payer: MEDICARE

## 2020-08-07 VITALS
HEART RATE: 82 BPM | SYSTOLIC BLOOD PRESSURE: 142 MMHG | WEIGHT: 139.6 LBS | HEIGHT: 64 IN | TEMPERATURE: 98.6 F | BODY MASS INDEX: 23.83 KG/M2 | DIASTOLIC BLOOD PRESSURE: 60 MMHG

## 2020-08-07 PROBLEM — I25.2 HISTORY OF MI (MYOCARDIAL INFARCTION): Status: ACTIVE | Noted: 2020-08-07

## 2020-08-07 PROBLEM — Z71.85 VACCINE COUNSELING: Status: ACTIVE | Noted: 2020-08-07

## 2020-08-07 PROCEDURE — 4040F PNEUMOC VAC/ADMIN/RCVD: CPT | Performed by: INTERNAL MEDICINE

## 2020-08-07 PROCEDURE — G8420 CALC BMI NORM PARAMETERS: HCPCS | Performed by: INTERNAL MEDICINE

## 2020-08-07 PROCEDURE — 1036F TOBACCO NON-USER: CPT | Performed by: INTERNAL MEDICINE

## 2020-08-07 PROCEDURE — 1123F ACP DISCUSS/DSCN MKR DOCD: CPT | Performed by: INTERNAL MEDICINE

## 2020-08-07 PROCEDURE — G8399 PT W/DXA RESULTS DOCUMENT: HCPCS | Performed by: INTERNAL MEDICINE

## 2020-08-07 PROCEDURE — 1090F PRES/ABSN URINE INCON ASSESS: CPT | Performed by: INTERNAL MEDICINE

## 2020-08-07 PROCEDURE — 3017F COLORECTAL CA SCREEN DOC REV: CPT | Performed by: INTERNAL MEDICINE

## 2020-08-07 PROCEDURE — G8427 DOCREV CUR MEDS BY ELIG CLIN: HCPCS | Performed by: INTERNAL MEDICINE

## 2020-08-07 PROCEDURE — 3051F HG A1C>EQUAL 7.0%<8.0%: CPT | Performed by: INTERNAL MEDICINE

## 2020-08-07 PROCEDURE — 2022F DILAT RTA XM EVC RTNOPTHY: CPT | Performed by: INTERNAL MEDICINE

## 2020-08-07 PROCEDURE — 99204 OFFICE O/P NEW MOD 45 MIN: CPT | Performed by: INTERNAL MEDICINE

## 2020-08-07 RX ORDER — LISINOPRIL 40 MG/1
40 TABLET ORAL DAILY
COMMUNITY
End: 2021-02-04 | Stop reason: SDUPTHER

## 2020-08-07 RX ORDER — LANOLIN ALCOHOL/MO/W.PET/CERES
1000 CREAM (GRAM) TOPICAL DAILY
COMMUNITY
Start: 2020-01-15

## 2020-08-07 RX ORDER — MELATONIN
1000 DAILY
COMMUNITY
End: 2022-07-14

## 2020-08-07 RX ORDER — MULTIVIT WITH MINERALS/LUTEIN
TABLET ORAL
COMMUNITY

## 2020-08-07 RX ORDER — PANTOPRAZOLE SODIUM 40 MG/1
40 TABLET, DELAYED RELEASE ORAL
Qty: 30 TABLET | Refills: 5 | Status: SHIPPED | OUTPATIENT
Start: 2020-08-07 | End: 2021-02-04 | Stop reason: SDUPTHER

## 2020-08-07 SDOH — ECONOMIC STABILITY: TRANSPORTATION INSECURITY
IN THE PAST 12 MONTHS, HAS LACK OF TRANSPORTATION KEPT YOU FROM MEETINGS, WORK, OR FROM GETTING THINGS NEEDED FOR DAILY LIVING?: NO

## 2020-08-07 SDOH — ECONOMIC STABILITY: FOOD INSECURITY: WITHIN THE PAST 12 MONTHS, YOU WORRIED THAT YOUR FOOD WOULD RUN OUT BEFORE YOU GOT MONEY TO BUY MORE.: NEVER TRUE

## 2020-08-07 SDOH — ECONOMIC STABILITY: FOOD INSECURITY: WITHIN THE PAST 12 MONTHS, THE FOOD YOU BOUGHT JUST DIDN'T LAST AND YOU DIDN'T HAVE MONEY TO GET MORE.: NEVER TRUE

## 2020-08-07 SDOH — ECONOMIC STABILITY: TRANSPORTATION INSECURITY
IN THE PAST 12 MONTHS, HAS THE LACK OF TRANSPORTATION KEPT YOU FROM MEDICAL APPOINTMENTS OR FROM GETTING MEDICATIONS?: NO

## 2020-08-07 SDOH — ECONOMIC STABILITY: INCOME INSECURITY: HOW HARD IS IT FOR YOU TO PAY FOR THE VERY BASICS LIKE FOOD, HOUSING, MEDICAL CARE, AND HEATING?: NOT HARD AT ALL

## 2020-08-07 ASSESSMENT — ENCOUNTER SYMPTOMS
DIARRHEA: 0
CHEST TIGHTNESS: 0
SHORTNESS OF BREATH: 0
CONSTIPATION: 0

## 2020-08-07 ASSESSMENT — PATIENT HEALTH QUESTIONNAIRE - PHQ9
SUM OF ALL RESPONSES TO PHQ9 QUESTIONS 1 & 2: 0
SUM OF ALL RESPONSES TO PHQ QUESTIONS 1-9: 0
SUM OF ALL RESPONSES TO PHQ QUESTIONS 1-9: 0
1. LITTLE INTEREST OR PLEASURE IN DOING THINGS: 0
2. FEELING DOWN, DEPRESSED OR HOPELESS: 0

## 2020-08-07 NOTE — PATIENT INSTRUCTIONS
Care instructions adapted under license by [facility] ([facility]). If you have questions about a medical condition or this instruction, always ask your healthcare professional. [address] any warranty or liability for your use of this information. Patient Education        Learning About Diabetes Food Guidelines  Your Care Instructions     Meal planning is important to manage diabetes. It helps keep your blood sugar at a target level (which you set with your doctor). You don't have to eat special foods. You can eat what your family eats, including sweets once in a while. But you do have to pay attention to how often you eat and how much you eat of certain foods. You may want to work with a dietitian or a certified diabetes educator (CDE) to help you plan meals and snacks. A dietitian or CDE can also help you lose weight if that is one of your goals. What should you know about eating carbs? Managing the amount of carbohydrate (carbs) you eat is an important part of healthy meals when you have diabetes. Carbohydrate is found in many foods. · Learn which foods have carbs. And learn the amounts of carbs in different foods. ? Bread, cereal, pasta, and rice have about 15 grams of carbs in a serving. A serving is 1 slice of bread (1 ounce), ½ cup of cooked cereal, or 1/3 cup of cooked pasta or rice. ? Fruits have 15 grams of carbs in a serving. A serving is 1 small fresh fruit, such as an apple or orange; ½ of a banana; ½ cup of cooked or canned fruit; ½ cup of fruit juice; 1 cup of melon or raspberries; or 2 tablespoons of dried fruit. ? Milk and no-sugar-added yogurt have 15 grams of carbs in a serving. A serving is 1 cup of milk or 2/3 cup of no-sugar-added yogurt. ? Starchy vegetables have 15 grams of carbs in a serving. A serving is ½ cup of mashed potatoes or sweet potato; 1 cup winter squash; ½ of a small baked potato; ½ cup of cooked beans; or ½ cup cooked corn or green peas.   · Learn how much carbs to eat each day and at each meal. A dietitian or CDE can teach you how to keep track of the amount of carbs you eat. This is called carbohydrate counting. · If you are not sure how to count carbohydrate grams, use the Plate Method to plan meals. It is a good, quick way to make sure that you have a balanced meal. It also helps you spread carbs throughout the day. ? Divide your plate by types of foods. Put non-starchy vegetables on half the plate, meat or other protein food on one-quarter of the plate, and a grain or starchy vegetable in the final quarter of the plate. To this you can add a small piece of fruit and 1 cup of milk or yogurt, depending on how many carbs you are supposed to eat at a meal.  · Try to eat about the same amount of carbs at each meal. Do not \"save up\" your daily allowance of carbs to eat at one meal.  · Proteins have very little or no carbs per serving. Examples of proteins are beef, chicken, turkey, fish, eggs, tofu, cheese, cottage cheese, and peanut butter. A serving size of meat is 3 ounces, which is about the size of a deck of cards. Examples of meat substitute serving sizes (equal to 1 ounce of meat) are 1/4 cup of cottage cheese, 1 egg, 1 tablespoon of peanut butter, and ½ cup of tofu. How can you eat out and still eat healthy? · Learn to estimate the serving sizes of foods that have carbohydrate. If you measure food at home, it will be easier to estimate the amount in a serving of restaurant food. · If the meal you order has too much carbohydrate (such as potatoes, corn, or baked beans), ask to have a low-carbohydrate food instead. Ask for a salad or green vegetables. · If you use insulin, check your blood sugar before and after eating out to help you plan how much to eat in the future. · If you eat more carbohydrate at a meal than you had planned, take a walk or do other exercise. This will help lower your blood sugar. What else should you know?   · Limit saturated fat, such as the fat from meat and dairy products. This is a healthy choice because people who have diabetes are at higher risk of heart disease. So choose lean cuts of meat and nonfat or low-fat dairy products. Use olive or canola oil instead of butter or shortening when cooking. · Don't skip meals. Your blood sugar may drop too low if you skip meals and take insulin or certain medicines for diabetes. · Check with your doctor before you drink alcohol. Alcohol can cause your blood sugar to drop too low. Alcohol can also cause a bad reaction if you take certain diabetes medicines. Follow-up care is a key part of your treatment and safety. Be sure to make and go to all appointments, and call your doctor if you are having problems. It's also a good idea to know your test results and keep a list of the medicines you take. Where can you learn more? Go to [email or web address].Tandem [email or web address] org and sign in to your [employer] account. Enter Y889 in the [employer] box to learn more about \"Learning About Diabetes Food Guidelines. \"     If you do not have an account, please click on the \"Sign Up Now\" link. Current as of: December 20, 2019               Content Version: 12.5  © 1278-3101 Healthwise, Incorporated. Care instructions adapted under license by [facility] ([facility]). If you have questions about a medical condition or this instruction, always ask your healthcare professional. [place] [address] any warranty or liability for your use of this information. Patient Education        Learning About Meal Planning for Diabetes  Why plan your meals? Meal planning can be a key part of managing diabetes. Planning meals and snacks with the right balance of carbohydrate, protein, and fat can help you keep your blood sugar at the target level you set with your doctor. You don't have to eat special foods. You can eat what your family eats, including sweets once in a while.  But you do rice and pasta, and milk and yogurt. Spreading carbohydrate throughout the day helps keep your blood sugar levels within your target range. Your daily amount depends on several things, including your weight, how active you are, which diabetes medicines you take, and what your goals are for your blood sugar levels. A registered dietitian or diabetes educator can help you plan how much carbohydrate to include in each meal and snack. A guideline for your daily amount of carbohydrate is:  · 45 to 60 grams at each meal. That's about the same as 3 to 4 carbohydrate servings. · 15 to 20 grams at each snack. That's about the same as 1 carbohydrate serving. The Nutrition Facts label on packaged foods tells you how much carbohydrate is in a serving of the food. First, look at the serving size on the food label. Is that the amount you eat in a serving? All of the nutrition information on a food label is based on that serving size. So if you eat more or less than that, you'll need to adjust the other numbers. Total carbohydrate is the next thing you need to look for on the label. If you count carbohydrate servings, one serving of carbohydrate is 15 grams. For foods that don't come with labels, such as fresh fruits and vegetables, you'll need a guide that lists carbohydrate in these foods. Ask your doctor, dietitian, or diabetes educator about books or other nutrition guides you can use. If you take insulin, you need to know how many grams of carbohydrate are in a meal. This lets you know how much rapid-acting insulin to take before you eat. If you use an insulin pump, you get a constant rate of insulin during the day. So the pump must be programmed at meals to give you extra insulin to cover the rise in blood sugar after meals. When you know how much carbohydrate you will eat, you can take the right amount of insulin.  Or, if you always use the same amount of insulin, you need to make sure that you eat the same amount of carbohydrate at meals. If you need more help to understand carbohydrate counting and food labels, ask your doctor, dietitian, or diabetes educator. How do you get started with meal planning? Here are some tips to get started:  · Plan your meals a week at a time. Don't forget to include snacks too. · Use cookbooks or online recipes to plan several main meals. Plan some quick meals for busy nights. You also can double some recipes that freeze well. Then you can save half for other busy nights when you don't have time to cook. · Make sure you have the ingredients you need for your recipes. If you're running low on basic items, put these items on your shopping list too. · List foods that you use to make breakfasts, lunches, and snacks. List plenty of fruits and vegetables. · Post this list on the refrigerator. Add to it as you think of more things you need. · Take the list to the store to do your weekly shopping. Follow-up care is a key part of your treatment and safety. Be sure to make and go to all appointments, and call your doctor if you are having problems. It's also a good idea to know your test results and keep a list of the medicines you take. Where can you learn more? Go to https://[employer] Media.[email or web address] org and sign in to your [employer] account. Enter V166 in the [facility] box to learn more about \"Learning About Meal Planning for Diabetes. \"     If you do not have an account, please click on the \"Sign Up Now\" link. Current as of: December 20, 2019               Content Version: 12.5  © 0820-7005 Healthwise, Incorporated. Care instructions adapted under license by [facility] ([facility]). If you have questions about a medical condition or this instruction, always ask your healthcare professional. [place] [address] any warranty or liability for your use of this information.          Patient Education        glipizide  Pronunciation:  GLIP belia lemus  Brand:  Glucotrol  What is the most important information I should know about glipizide? You should not use glipizide if you have diabetic ketoacidosis (call your doctor for treatment). What is glipizide? Glipizide is an oral diabetes medicine that helps control blood sugar levels by helping your pancreas produce insulin. Glipizide is used together with diet and exercise to improve blood sugar control in adults with type 2 diabetes mellitus. Glipizide is not for treating type 1 diabetes. Glipizide may also be used for purposes not listed in this medication guide. What should I discuss with my healthcare provider before taking glipizide? You should not use this medicine if you are allergic to glipizide, or if you have diabetic ketoacidosis (call your doctor for treatment). Tell your doctor if you have ever had:  · liver or kidney disease;  · chronic diarrhea, or a blockage in your intestines; or  · an enzyme deficiency called glucose-6-phosphate dehydrogenase deficiency (G6PD). Follow your doctor's instructions about using this medicine if you are pregnant. Blood sugar control is very important during pregnancy, and your dose needs may be different during each trimester. You should not take glipizide during the last 2 weeks of pregnancy. It may not be safe to breast-feed while using this medicine. Ask your doctor about any risk. How should I take glipizide? Follow all directions on your prescription label. Your doctor may occasionally change your dose. Do not take this medicine in larger or smaller amounts or for longer than recommended. Take the glipizide regular tablet 30 minutes before your first meal of the day. Take the glipizide extended-release tablet with your first meal of the day. Swallow the tablet whole and do not crush, chew, or break it. Your blood sugar will need to be checked often, and you may need other blood tests at your doctor's office.   Low blood sugar (hypoglycemia) can happen to everyone who has you.  What are the possible side effects of glipizide? Get emergency medical help if you have signs of an allergic reaction: hives; difficulty breathing; swelling of your face, lips, tongue, or throat. Call your doctor at once if you have symptoms of low blood sugar:  · headache, irritability  · sweating, fast heart rate;  · dizziness, nausea; or  · hunger, feeling anxious or shaky. Common side effects may include:  · diarrhea, constipation, gas;  · dizziness, drowsiness;  · tremors; or  · skin rash, redness, or itching. This is not a complete list of side effects and others may occur. Call your doctor for medical advice about side effects. You may report side effects to FDA at 6-971-FDA-9519. What other drugs will affect glipizide? Sometimes it is not safe to use certain medications at the same time. Some drugs can affect your blood levels of other drugs you take, which may increase side effects or make the medications less effective. Many drugs can affect glipizide. This includes prescription and over-the-counter medicines, vitamins, and herbal products. Not all possible interactions are listed here. Tell your doctor about all your current medicines and any medicine you start or stop using. Where can I get more information? Your pharmacist can provide more information about glipizide. Remember, keep this and all other medicines out of the reach of children, never share your medicines with others, and use this medication only for the indication prescribed. Every effort has been made to ensure that the information provided by [address] is accurate, up-to-date, and complete, but no guarantee is made to that effect. Drug information contained herein may be time sensitive.  [facility] information has been compiled for use by healthcare practitioners and consumers in the [facility] and therefore [employer] does not warrant that uses outside of the [facility] are appropriate, unless would occur in people using regular doses. Ask your doctor about your risk. Call your doctor at once if you have signs of a thyroid tumor, such as swelling or a lump in your neck, trouble swallowing, a hoarse voice, or shortness of breath. What is dulaglutide? Dulaglutide is an injectable diabetes medicine that helps control blood sugar levels. Dulaglutide is used together with diet and exercise to improve blood sugar control in adults with type 2 diabetes mellitus. Dulaglutide is usually given after other diabetes medicines have been tried without success. This medicine is not for treating type 1 diabetes. Dulaglutide may also be used for purposes not listed in this medication guide. What should I discuss with my healthcare provider before using dulaglutide? You should not use dulaglutide if you are allergic to it, or if you have:  · multiple endocrine neoplasia type 2 (tumors in your glands);  · a personal or family history of medullary thyroid carcinoma (a type of thyroid cancer); or  · diabetic ketoacidosis (call your doctor for treatment). Tell your doctor if you have ever had:  · pancreatitis;  · a stomach or intestinal disorder;  · gastroesophageal reflux disease (GERD) or slow digestion;  · liver or kidney disease;  · if you also use insulin or oral diabetes medicine; or  · if you have been sick with vomiting or diarrhea. In animal studies, dulaglutide caused thyroid tumors or thyroid cancer. It is not known whether these effects would occur in people using regular doses. Ask your doctor about your risk. It is not known whether this medicine will harm an unborn baby. Tell your doctor if you are pregnant or plan to become pregnant. It may not be safe to breast-feed while using this medicine. Ask your doctor about any risk. Dulaglutide is not approved for use by anyone younger than 25years old. How should I use dulaglutide?   Follow all directions on your prescription label and read all from light. Do not use past the expiration date on the medicine label. Do not freeze dulaglutide, and throw away the medicine if it has become frozen. You may also store dulaglutide at room temperature for up to 14 days before use. What happens if I miss a dose? Use the medicine as soon as you can, but skip the missed dose if your next dose is due in less than 3 days. Do not use two doses at one time. Do not use this medicine twice within a 72-hour period. What happens if I overdose? Seek emergency medical attention or call the Poison Help line at 1-467.181.3751. What should I avoid while using dulaglutide? Never share an injection pen or prefilled syringe with another person, even if the needle has been changed. Sharing these devices can allow infections or disease to pass from one person to another. What are the possible side effects of dulaglutide? Stop using dulaglutide and get emergency medical help if you have signs of an allergic reaction: hives; difficult breathing; feeling light-headed; swelling of your face, lips, tongue, or throat. Call your doctor at once if you have:  · pancreatitis --severe pain in your upper stomach spreading to your back, nausea and vomiting;  · signs of a thyroid tumor --swelling or a lump in your neck, trouble swallowing, a hoarse voice, or if you feel short of breath;  · low blood sugar --headache, hunger, weakness, sweating, confusion, irritability, dizziness, fast heart rate, or feeling jittery; or  · kidney problems --little or no urination, swelling in your feet or ankles, feeling tired or short of breath. Tell your doctor if you are sick with vomiting or diarrhea, or if you are sweating more than usual. You can easily become dehydrated while using dulaglutide. Common side effects may include:  · nausea, vomiting, stomach pain;  · diarrhea; or  · loss of appetite. This is not a complete list of side effects and others may occur.  Call your doctor for medical advice about side effects. You may report side effects to FDA at 2-164-FDA-1611. What other drugs will affect dulaglutide? Dulaglutide can slow your digestion, and it may take longer for your body to absorb any medicines you take by mouth. Other drugs may affect dulaglutide, including prescription and over-the-counter medicines, vitamins, and herbal products. Tell your doctor about all your current medicines and any medicine you start or stop using. Where can I get more information? Your pharmacist can provide more information about dulaglutide. Remember, keep this and all other medicines out of the reach of children, never share your medicines with others, and use this medication only for the indication prescribed. Every effort has been made to ensure that the information provided by [facility] Kuldeep Escudero is accurate, up-to-date, and complete, but no guarantee is made to that effect. Drug information contained herein may be time sensitive. [facility] information has been compiled for use by healthcare practitioners and consumers in the [facility] and therefore [facility] does not warrant that uses outside of the [facility] are appropriate, unless specifically indicated otherwise. [facility]'s drug information does not endorse drugs, diagnose patients or recommend therapy. [facility] drug information is an informational resource designed to assist licensed healthcare practitioners in caring for their patients and/or to serve consumers viewing this service as a supplement to, and not a substitute for, the expertise, skill, knowledge and judgment of healthcare practitioners. The absence of a warning for a given drug or drug combination in no way should be construed to indicate that the drug or drug combination is safe, effective or appropriate for any given patient. [facility] does not assume any responsibility for any aspect of healthcare administered with the aid of information [facility] provides.  The information contained herein is not intended to cover all possible uses, directions, precautions, warnings, drug interactions, allergic reactions, or adverse effects. If you have questions about the drugs you are taking, check with your doctor, nurse or pharmacist.  Copyright 5203-3081 [address]: 2.02. Revision date: 7/15/2019. Care instructions adapted under license by [facility] ([facility]). If you have questions about a medical condition or this instruction, always ask your healthcare professional. [place] [address] any warranty or liability for your use of this information. Patient Education        Influenza (Flu) Vaccine: Care Instructions  Your Care Instructions     Influenza (flu) is an infection in the lungs and breathing passages. It is caused by the influenza virus. There are different strains, or types, of the flu virus every year. The flu comes on quickly. It can cause a cough, stuffy nose, fever, chills, tiredness, and aches and pains. These symptoms may last up to 10 days. The flu can make you feel very sick, but most of the time it doesn't lead to other problems. But it can cause serious problems in people who are older or who have a long-term illness, such as heart disease or diabetes. You can help prevent the flu by getting a flu vaccine every year, as soon as it is available. You cannot get the flu from the vaccine. The vaccine prevents most cases of the flu. But even when the vaccine doesn't prevent the flu, it can make symptoms less severe and reduce the chance of problems from the flu. Sometimes, young children and people who have an immune system problem may have a slight fever or muscle aches or pains 6 to 12 hours after getting the shot. These symptoms usually last 1 or 2 days. Follow-up care is a key part of your treatment and safety. Be sure to make and go to all appointments, and call your doctor if you are having problems.  It's also a good idea to know your test results and keep a list of the medicines you take. Who should get the flu vaccine? Everyone age 7 months or older should get a flu vaccine each year. It lowers the chance of getting and spreading the flu. The vaccine is very important for people who are at high risk for getting other health problems from the flu. This includes:  · Anyone 48years of age or older. · People who live in a long-term care center, such as a nursing home. · All children 6 months through 25years of age. · Adults and children 6 months and older who have long-term heart or lung problems, such as asthma. · Adults and children 6 months and older who needed medical care or were in a hospital during the past year because of diabetes, chronic kidney disease, or a weak immune system (including HIV or AIDS). · Women who will be pregnant during the flu season. · People who have any condition that can make it hard to breathe or swallow (such as a brain injury or muscle disorders). · People who can give the flu to others who are at high risk for problems from the flu. This includes all health care workers and close contacts of people age 72 or older. Who should not get the flu vaccine? The person who gives the vaccine may tell you not to get it if you:  · Have a severe allergy to eggs or any part of the vaccine. · Have had a severe reaction to a flu vaccine in the past.  · Have had Guillain-Barré syndrome (GBS). · Are sick with a fever. (Get the vaccine when symptoms are gone.)  How can you care for yourself at home? · If you or your child has a sore arm or a slight fever after the shot, take an over-the-counter pain medicine, such as acetaminophen (Tylenol) or ibuprofen (Advil, Motrin). Read and follow all instructions on the label. Do not give aspirin to anyone younger than 20. It has been linked to Reye syndrome, a serious illness. · Do not take two or more pain medicines at the same time unless the doctor told you to.  Many pain medicines have acetaminophen, which is Tylenol. Too much acetaminophen (Tylenol) can be harmful. When should you call for help? [id number] anytime you think you may need emergency care. For example, call if after getting the flu vaccine:  · You have symptoms of a severe reaction to the flu vaccine. Symptoms of a severe reaction may include:  ? Severe difficulty breathing. ? Sudden raised, red areas (hives) all over your body. ? Severe lightheadedness. Call your doctor now or seek immediate medical care if after getting the flu vaccine:  · You think you are having a reaction to the flu vaccine, such as a new fever. Watch closely for changes in your health, and be sure to contact your doctor if you have any problems. Where can you learn more? Go to [email or web address] and sign in to your [employer] account. Enter O654 in the [employer] box to learn more about \"Influenza (Flu) Vaccine: Care Instructions. \"     If you do not have an account, please click on the \"Sign Up Now\" link. Current as of: December 9, 2019               Content Version: 12.5  © 0089-7624 Healthwise, [employer]. Care instructions adapted under license by [facility] ([facility]). If you have questions about a medical condition or this instruction, always ask your healthcare professional. [address] any warranty or liability for your use of this information. Patient Education        Influenza (Flu) Vaccine (Inactivated or Recombinant): What You Need to Know  Why get vaccinated? Influenza vaccine can prevent influenza (flu). Flu is a contagious disease that spreads around the [place] every year, usually between October and May. Anyone can get the flu, but it is more dangerous for some people.  Infants and young children, people 72years of age and older, pregnant women, and people with certain health conditions or a weakened immune system are at greatest risk of flu complications. Pneumonia, bronchitis, sinus infections and ear infections are examples of flu-related complications. If you have a medical condition, such as heart disease, cancer or diabetes, flu can make it worse. Flu can cause fever and chills, sore throat, muscle aches, fatigue, cough, headache, and runny or stuffy nose. Some people may have vomiting and diarrhea, though this is more common in children than adults. Each year, thousands of people in the [facility] die from flu, and many more are hospitalized. Flu vaccine prevents millions of illnesses and flu-related visits to the doctor each year. Influenza vaccine  CDC recommends everyone 10months of age and older get vaccinated every flu season. Children 6 months through 6years of age may need 2 doses during a single flu season. Everyone else needs only 1 dose each flu season. It takes about 2 weeks for protection to develop after vaccination. There are many flu viruses, and they are always changing. Each year a new flu vaccine is made to protect against three or four viruses that are likely to cause disease in the upcoming flu season. Even when the vaccine doesn't exactly match these viruses, it may still provide some protection. Influenza vaccine does not cause flu. Influenza vaccine may be given at the same time as other vaccines. Talk with your health care provider  Tell your vaccine provider if the person getting the vaccine:  · Has had an allergic reaction after a previous dose of influenza vaccine, or has any severe, life-threatening allergies. · Has ever had Guillain-Barré Syndrome (also called GBS). In some cases, your health care provider may decide to postpone influenza vaccination to a future visit. People with minor illnesses, such as a cold, may be vaccinated. People who are moderately or severely ill should usually wait until they recover before getting influenza vaccine.   Your health care provider can give you more information. Risks of a vaccine reaction  · Soreness, redness, and swelling where shot is given, fever, muscle aches, and headache can happen after influenza vaccine. · There may be a very small increased risk of Guillain-Barré Syndrome (GBS) after inactivated influenza vaccine (the flu shot). [employer] children who get the flu shot along with pneumococcal vaccine (PCV13), and/or DTaP vaccine at the same time might be slightly more likely to have a seizure caused by fever. Tell your health care provider if a child who is getting flu vaccine has ever had a seizure. People sometimes faint after medical procedures, including vaccination. Tell your provider if you feel dizzy or have vision changes or ringing in the ears. As with any medicine, there is a very remote chance of a vaccine causing a severe allergic reaction, other serious injury, or death. What if there is a serious problem? An allergic reaction could occur after the vaccinated person leaves the clinic. If you see signs of a severe allergic reaction (hives, swelling of the face and throat, difficulty breathing, a fast heartbeat, dizziness, or weakness), call 9-1-1 and get the person to the nearest hospital.  For other signs that concern you, call your health care provider. Adverse reactions should be reported to the Vaccine Adverse Event Reporting System (VAERS). Your health care provider will usually file this report, or you can do it yourself. Visit the VAERS website at www.vaers. hhs.gov or call 1-179.250.8259. VAERS is only for reporting reactions, and VAERS staff do not give medical advice. The National Vaccine Injury Compensation Program  The National Vaccine Injury Compensation Program (VICP) is a federal program that was created to compensate people who may have been injured by certain vaccines. Visit the VICP website at www.hrsa.gov/vaccinecompensation or call 7-911.278.8168 to learn about the program and about filing a claim.  There is a time only the one shot and no booster doses. The timing of this vaccination is very important for it to be effective. Your child's individual booster schedule may be different from these guidelines. Follow your doctor's instructions or the schedule recommended by the health department of the state you live in. Keep track of any and all side effects your child has after receiving this vaccine. When the child receives a booster dose, you will need to tell the doctor if the previous shot caused any side effects. You can still receive a vaccine if you have a minor cold. In the case of a more severe illness with a fever or any type of infection, wait until you get better before receiving this vaccine. Becoming infected with pneumococcal disease (such as pneumonia or meningitis) is much more dangerous to your health than receiving this vaccine. However, like any medicine, this vaccine can cause side effects but the risk of serious side effects is extremely low. Be sure to keep your child on a regular schedule for other immunizations against diseases such as diphtheria, tetanus, pertussis (whooping cough), measles, mumps, hepatitis, or varicella (chicken pox). Your doctor or state health department can provide you with a recommended immunization schedule. What is pneumococcal 13-valent conjugate vaccine? Pneumococcal disease is a serious infection caused by a bacteria. Pneumococcal bacteria can infect the sinuses and inner ear. It can also infect the lungs, blood, and brain, and these conditions can be fatal.  Pneumococcal 13-valent vaccine is used to prevent infection caused by pneumococcal bacteria. This vaccine contains 13 different types of pneumococcal bacteria. Pneumococcal 13-valent vaccine works by exposing you to a small amount of the bacteria or a protein from the bacteria, which causes the body to develop immunity to the disease.  This vaccine will not treat an active infection that has already developed in the body. Pneumococcal 13-valent vaccine is for use in children from 6 weeks to 11years old, and in adults who are 48 and older. Becoming infected with pneumococcal disease (such as pneumonia or meningitis) is much more dangerous to your health than receiving this vaccine. However, like any medicine, this vaccine can cause side effects but the risk of serious side effects is extremely low. Like any vaccine, pneumococcal 13-valent vaccine may not provide protection from disease in every person. What should I discuss with my healthcare provider before receiving this vaccine? Keep track of any and all side effects your child has after receiving this vaccine. When the child receives a booster dose, you will need to tell the doctor if the previous shot caused any side effects. You should not receive this vaccine if you ever had a severe allergic reaction to a pneumococcal or diphtheria vaccine. Before your child receives this vaccine, tell your doctor if the child was born prematurely. To make sure you or your child can safely receive this vaccine, tell your doctor if you or your child have any of these other conditions:  · a bleeding or blood clotting disorder such as hemophilia or easy bruising; or  · a weak immune system caused by disease, bone marrow transplant, or by using certain medicines or receiving cancer treatments. You can still receive a vaccine if you have a minor cold. In the case of a more severe illness with a fever or any type of infection, wait until you get better before receiving this vaccine. How is this vaccine given? This vaccine is injected into a muscle. You will receive this injection in a doctor's office or clinic setting. For children, the pneumococcal 13-valent vaccine is given in a series of shots. The first shot is usually given when the child is 3 months old. The booster shots are then given at 4 months, 6 months, and 15to 13months of age.  Adults usually receive only one dose of the vaccine. The first injection should be given no earlier than 10weeks of age. Allow at least 2 months to pass between injections. If your child is older than 6 months, he or she can still receive this vaccine on the following schedule:  · Age 7-11 months: two injections at least 4 weeks apart, followed by a third injection after the child turns 1 year (at least 2 months after the second injection); · Age 12-23 months: two injections at least 2 months apart;  · Age 19 months to 5 years (before the 7th birthday): one injection. The timing of this vaccination is very important for it to be effective. Your child's individual booster schedule may be different from these guidelines. Follow your doctor's instructions or the schedule recommended by the health department of the state you live in. Your doctor may recommend treating fever and pain with an aspirin-free pain reliever such as acetaminophen (Tylenol) or ibuprofen (Motrin, Advil, and others) when the shot is given and for the next 24 hours. Follow the label directions or your doctor's instructions about how much of this medicine to give your child. It is especially important to prevent fever from occurring in a child who has a seizure disorder such as epilepsy. Be sure to keep your child on a regular schedule for other immunizations such as diphtheria, tetanus, pertussis (whooping cough), hepatitis, and varicella (chicken pox). Your doctor or state health department can provide you with a recommended immunization schedule. What happens if I miss a dose? Contact your doctor if your child will miss a booster dose or gets behind schedule. The next dose should be given as soon as possible. There is no need to start over. Be sure your child receives all recommended doses of this vaccine. If your child does not receive the full series of vaccines, he or she may not be fully protected against the disease. What happens if I overdose?   An overdose of this vaccine is unlikely to occur. What should I avoid before or after receiving this vaccine? Follow your doctor's instructions about any restrictions on food, beverages, or activity. What are the possible side effects of this vaccine? Your child should not receive a booster vaccine if he or she had a life-threatening allergic reaction after the first shot. Keep track of any and all side effects your child has after receiving this vaccine. When the child receives a booster dose, you will need to tell the doctor if the previous shot caused any side effects. Get emergency medical help if your child has any of these signs of an allergic reaction: hives; difficulty breathing; swelling of the face, lips, tongue, or throat. Call your doctor at once if you or your child has a serious side effect such as:  · high fever (103 degrees or higher);  · seizure (convulsions);  · wheezing, trouble breathing;  · severe stomach pain, severe vomiting or diarrhea;  · easy bruising or bleeding; or  · severe pain, itching, irritation, or skin changes where the shot was given. Less serious side effects include  · crying, fussiness;  · headache, tired feeling;  · muscle or joint pain;  · drowsiness, sleeping more or less than usual;  · mild redness, swelling, tenderness, or a hard lump where the shot was given;  · loss of appetite, mild vomiting or diarrhea;  · low fever (102 degrees or less), chills; or  · mild skin rash. This is not a complete list of side effects and others may occur. Call your doctor for medical advice about side effects. You may report vaccine side effects to the [place] [address] and Human Services at [phone]. What other drugs will affect this vaccine? Before receiving this vaccine, tell the doctor about all other vaccines you or your child have recently received.   Also tell the doctor if you or your child have recently received drugs or treatments that can weaken the immune system, informational resource designed to assist licensed healthcare practitioners in caring for their patients and/or to serve consumers viewing this service as a supplement to, and not a substitute for, the expertise, skill, knowledge and judgment of healthcare practitioners. The absence of a warning for a given drug or drug combination in no way should be construed to indicate that the drug or drug combination is safe, effective or appropriate for any given patient. [facility] does not assume any responsibility for any aspect of healthcare administered with the aid of information [facility] provides. The information contained herein is not intended to cover all possible uses, directions, precautions, warnings, drug interactions, allergic reactions, or adverse effects. If you have questions about the drugs you are taking, check with your doctor, nurse or pharmacist.  Copyright 7638-8026 [address] [place] [address] Dr MARR. Version: 4.01. Revision date: 1/16/2012. Care instructions adapted under license by [facility] ([facility]). If you have questions about a medical condition or this instruction, always ask your healthcare professional. [place] [address] any warranty or liability for your use of this information. Patient Education        Pneumococcal Polysaccharide Vaccine: Care Instructions  Your Care Instructions     The pneumococcal polysaccharide vaccine (PPSV) can prevent some of the serious complications of pneumonia. This includes infection in the bloodstream (bacteremia) or throughout the body (septicemia). PPSV is recommended for people ages 72 years and older. People ages 3 to 59 who have a long-term illness should also get the vaccine. This includes people with diabetes, heart disease, liver disease, or lung disease. PPSV can also help people who have a weakened immune system. This includes cancer patients and people who don't have a spleen. The immune system helps your body fight infection and other illnesses.   PPSV is given as a shot. It's usually given in the arm. Healthy older adults get the shot once. Other people may need to have a second dose. The shot may cause pain and redness at the site. It may also cause a mild fever for a short time. Follow-up care is a key part of your treatment and safety. Be sure to make and go to all appointments, and call your doctor if you are having problems. It's also a good idea to know your test results and keep a list of the medicines you take. How can you care for yourself at home? · Take an over-the-counter pain medicine, such as acetaminophen (Tylenol), ibuprofen (Advil, Motrin), or naproxen (Aleve), if your arm is sore after the shot. Be safe with medicines. Read and follow all instructions on the label. · Give acetaminophen (Tylenol) or ibuprofen (Advil, Motrin) to your child for pain or fussiness after the shot. Read and follow all instructions on the label. Do not give aspirin to anyone younger than 20. It has been linked to Reye syndrome, a serious illness. · Put ice or a cold pack on the sore area for 10 to 20 minutes at a time. Put a thin cloth between the ice and your skin. When should you call for help? [id number] anytime you think you may need emergency care. For example, call if:  · You have a seizure. · You have symptoms of a severe allergic reaction. These may include:  ? Sudden raised, red areas (hives) all over the body. ? Swelling of the throat, mouth, lips, or tongue. ? Trouble breathing. ? Passing out (losing consciousness). Or you may feel very lightheaded or suddenly feel weak, confused, or restless. Call your doctor now or seek immediate medical care if:  · You have symptoms of an allergic reaction, such as:  ? A rash or hives (raised, red areas on the skin). ? Itching. ? Swelling. ? Belly pain, nausea, or vomiting. · You have a high fever. Watch closely for changes in your health, and be sure to contact your doctor if you have any problems.   Where can you learn more? Go to [email or web address] and sign in to your [employer] account. Enter T225 in the [facility] box to learn more about \"Pneumococcal Polysaccharide Vaccine: Care Instructions. \"     If you do not have an account, please click on the \"Sign Up Now\" link. Current as of: December 9, 2019               Content Version: 12.5  © 3027-6877 Healthwise[employer]. Care instructions adapted under license by [facility] ([facility]). If you have questions about a medical condition or this instruction, always ask your healthcare professional. [place] [address] any warranty or liability for your use of this information.        -

## 2020-08-07 NOTE — PROGRESS NOTES
Patient: Val Cisneros is a 69 y.o. female who presents today with the following Chief Complaint(s):  Chief Complaint   Patient presents with   [facility] Patient     Patient concerned with her blood sugar levels. HPI     Here today to establish. PMHX:  1. DM 2- states that her sugar has been increasing. Is currently taking Januvia 100 mg qd for over 6 months. Is also on metformin ER 1,000 mg BID which does give her diarrhea. Has had diarrhea all along and has gotten worse as her does has increased. Has never tried anything else. 2. CAD with history of MI- April 2018. Treated with PTCA and stent. Follows with Dr. Damaso Olguin. On ASA, lisinopril 40 mg qd, diltiazem  mg qd, and Crestor 10 mg qd with NTG SL prn. Sx of CAD were NULL and stomach pain. 3. HTN- on lisinopril 40 mg qd and diltiazem  mg qd. Had trouble finding medication that she could take and control her blood pressure. 4. Low B-12- on oral b-12, previously had injections. 5. HLD- well controlled on Crestor 10 mg qd. Has been having some heart burn- did have testing done that showed slowing of food through esophagus. Is in need of EGD and colonoscopy and has not been referred to GI. Does also notice that food will feel like it is stuck from time to time. Is not on a PPI. Has noticed difficulty with urine stream- stops and starts and is slow. Having nocturia (4-5 times per night). Has also been drinking more water \"right before bed\".      Lab Results   Component Value Date    LABA1C 7.2 08/04/2020     Lab Results   Component Value Date    .9 08/04/2020     Lab Results   Component Value Date    LABA1C 7.2 08/04/2020    LABA1C 7.1 04/21/2018    LABA1C 6.2 08/16/2013     Lab Results   Component Value Date    LABMICR YES 04/20/2018    LDLCALC 66 08/04/2020    CREATININE <0.5 (L) 08/04/2020     Lab Results   Component Value Date     08/04/2020    K 4.1 08/04/2020     08/04/2020    CO2 25 08/04/2020    BUN 13 08/04/2020    CREATININE <0.5 (L) 08/04/2020    GLUCOSE 160 (H) 08/04/2020    CALCIUM 9.3 08/04/2020    PROT 7.0 08/04/2020    LABALBU 4.7 08/04/2020    BILITOT 0.3 08/04/2020    ALKPHOS 66 08/04/2020    AST 18 08/04/2020    ALT 18 08/04/2020    LABGLOM >60 08/04/2020    GFRAA >60 08/04/2020    AGRATIO 2.0 08/04/2020    GLOB 2.3 08/04/2020       Lab Results   Component Value Date    CHOL 144 08/04/2020    CHOL 136 08/20/2019    CHOL 191 04/21/2018     Lab Results   Component Value Date    TRIG 157 (H) 08/04/2020    TRIG 130 08/20/2019    TRIG 484 (H) 04/21/2018     Lab Results   Component Value Date    HDL 47 08/04/2020    HDL 44 08/20/2019    HDL 35 (L) 04/21/2018     Lab Results   Component Value Date    LDLCALC 66 08/04/2020    LDLCALC 66 08/20/2019    LDLCALC see below 04/21/2018     Lab Results   Component Value Date    LABVLDL 31 08/04/2020    LABVLDL 26 08/20/2019    LABVLDL see below 04/21/2018     No results found for: CHOLHDLRATIO        Allergies   Allergen Reactions    Asa Arthritis Strength-Antacid [Aspirin Buff, Al Hyd-Mg Hyd] Nausea Only     Per patient stopped taking because it upset her stomach. She denies itching, swelling, rash.  Aspirin      Other reaction(s): GI Upset    Atorvastatin      Other reaction(s): Muscle Aches    Erythromycin Base Hives    Macrolides And Ketolides     Sulfa Antibiotics     Troleandomycin     Verapamil      Other reaction(s):  Other (See Comments)  Low heart rate       Past Medical History:   Diagnosis Date    Allergic rhinitis     Fibromyalgia     GERD (gastroesophageal reflux disease)     Heart attack ([address]     Heart disease     Herniated disc     Hyperlipidemia     Hypertension     Type II or unspecified type diabetes mellitus without mention of complication, not stated as uncontrolled       Past Surgical History:   Procedure Laterality Date    CORONARY ANGIOPLASTY WITH STENT PLACEMENT  2018    HYMENECTOMY        Social History Socioeconomic History    Marital status:      Spouse name: Not on file    Number of children: Not on file    Years of education: Not on file    Highest education level: Not on file   Occupational History    Not on file   Social Needs    Financial resource strain: Not hard at all    Food insecurity     Worry: Never true     Inability: Never true   [employer] needs     Medical: No     Non-medical: No   Tobacco Use    Smoking status: Never Smoker    Smokeless tobacco: Never Used   Substance and Sexual Activity    Alcohol use: No    Drug use: No    Sexual activity: Not on file     Comment:    Lifestyle    Physical activity     Days per week: Not on file     Minutes per session: Not on file    Stress: Not on file   Relationships    Social connections     Talks on phone: Not on file     Gets together: Not on file     Attends [employer] service: Not on file     Active member of club or organization: Not on file     Attends meetings of clubs or organizations: Not on file     Relationship status: Not on file    Intimate partner violence     Fear of current or ex partner: Not on file     Emotionally abused: Not on file     Physically abused: Not on file     Forced sexual activity: Not on file   Other Topics Concern    Not on file   Social History Narrative    Not on file     Family History   Problem Relation Age of Onset    Heart Disease Mother     Cancer Father         ? lymphoma ( when patient was very young)    Diabetes Sister     Thyroid Disease Daughter         Outpatient Medications Prior to Visit   Medication Sig Dispense Refill    vitamin D3 (CHOLECALCIFEROL) 25 MCG (1000 UT) TABS tablet Take 1,000 Units by mouth daily      vitamin B-12 (CYANOCOBALAMIN) 1000 MCG tablet Take 1,000 mcg by mouth daily      Multiple Vitamins-Minerals (CENTRUM SILVER) TABS Take by mouth      lisinopril (PRINIVIL;ZESTRIL) 40 MG tablet Take 40 mg by mouth daily      nitroGLYCERIN (NITROSTAT) 0.4 MG SL tablet Place 1 tablet under the tongue every 5 minutes as needed for Chest pain 25 tablet 2    SITagliptin (JANUVIA) 100 MG tablet Take 1 tablet by mouth daily 30 tablet 3    metFORMIN (GLUCOPHAGE-XR) 500 MG extended release tablet Take 1,000 mg by mouth 2 times daily       aspirin 81 MG chewable tablet Take 1 tablet by mouth daily 30 tablet 3    rosuvastatin (CRESTOR) 10 MG tablet Take 1 tablet by mouth nightly 30 tablet 3    diltiazem (TIAZAC) 360 MG SR capsule TAKE 1 CAPSULE BY MOUTH ONE TIME A DAY 30 capsule 0    lisinopril (PRINIVIL;ZESTRIL) 20 MG tablet Take 1 tablet by mouth 2 times daily (Patient taking differently: Take 40 mg by mouth 2 times daily ) 180 tablet 3     No facility-administered medications prior to visit. Patient'spast medical history, surgical history, family history, medications,  and allergies  were all reviewed and updated as appropriate today. Review of Systems   Constitutional: Negative for appetite change, fatigue and fever. Respiratory: Negative for chest tightness and shortness of breath. Cardiovascular: Negative for chest pain. Gastrointestinal: Negative for constipation and diarrhea. Skin: Negative for rash. BP (!) 142/60   Pulse 82   Temp 98.6 °F (37 °C) (Temporal)   Ht 5' 4\" (1.626 m)   Wt 139 lb 9.6 oz (63.3 kg)   BMI 23.96 kg/m²   Physical Exam  Vitals signs and nursing note reviewed. Constitutional:       Appearance: She is well-developed. She is not toxic-appearing. HENT:      Head: Normocephalic. Right Ear: Tympanic membrane, ear canal and external ear normal.      Left Ear: Tympanic membrane, ear canal and external ear normal.      Nose: Nose normal.   Eyes:      Extraocular Movements: Extraocular movements intact. Pupils: Pupils are equal, round, and reactive to light. Neck:      Thyroid: No thyroid mass or thyromegaly. Vascular: No carotid bruit.    Cardiovascular:      Rate and Rhythm: Normal rate and regular rhythm. Pulses:           Dorsalis pedis pulses are 2+ on the right side and 2+ on the left side. Posterior tibial pulses are 2+ on the right side and 2+ on the left side. Heart sounds: Normal heart sounds. No murmur. Comments: No LE edema  Pulmonary:      Effort: Pulmonary effort is normal.      Breath sounds: Normal breath sounds. Abdominal:      Palpations: Abdomen is soft. Tenderness: There is no abdominal tenderness. Musculoskeletal:      Right foot: Normal range of motion. No deformity, bunion, Charcot foot, foot drop or prominent metatarsal heads. Left foot: Normal range of motion. No deformity, bunion, Charcot foot, foot drop or prominent metatarsal heads. Feet:      Right foot:      Protective Sensation: 10 sites tested. 10 sites sensed. Skin integrity: Skin integrity normal.      Toenail Condition: Right toenails are normal.      Left foot:      Protective Sensation: 10 sites tested. 10 sites sensed. Skin integrity: Skin integrity normal.      Toenail Condition: Left toenails are normal.   Lymphadenopathy:      Cervical: No cervical adenopathy. Neurological:      Mental Status: She is alert. Psychiatric:         Mood and Affect: Mood normal.         Behavior: Behavior normal. Behavior is cooperative. ASSESSMENT/PLAN:    Problem List Items Addressed This Visit     B12 deficiency     Previously on B12 injections but is not taking oral B12. Check B12 level. Relevant Orders    Vitamin B12 & Folate    Colon cancer screening     Referral placed for colonoscopy. Relevant Orders    SUMIT - Arminda Amos MD, Gastroenterology, [facility]    Coronary artery disease involving native coronary artery of native heart without angina pectoris     Follows with Dr. Caty Ellis. Status post MI in April 2018 that was treated with PTCA and stenting. Asymptomatic at present.   Currently on aspirin, lisinopril 40 mg daily, Cardizem  mg daily, and Crestor 10 mg daily with nitroglycerin sublingual as needed. Angina symptoms were dyspnea on exertion and stomach pain. Relevant Medications    lisinopril (PRINIVIL;ZESTRIL) 40 MG tablet    Food sticks on swallowing     Refer to GI for EGD. Start Protonix 40 mg daily. Relevant Medications    pantoprazole (PROTONIX) 40 MG tablet    Other Relevant Orders    SUMIT Paniagua MD, Gastroenterology, [facility]    GERD (gastroesophageal reflux disease) - Primary     More symptomatic. Continue Protonix 40 mg daily. Refer to GI for possible EGD. Relevant Medications    pantoprazole (PROTONIX) 40 MG tablet    Other Relevant Orders    SUMIT Paniagua MD, Gastroenterology, [facility]    History of MI (myocardial infarction)     Status post MI in April 2018 that was treated with PTCA and stenting. Follows with Dr. Norma Luna. On ASA, lisinopril 40 mg qd, diltiazem  mg qd, and Crestor 10 mg qd with NTG SL prn. Sx of CAD were NULL and stomach pain. HTN (hypertension)     Patient states that initially her blood pressure was difficult to control. Continue lisinopril 40 mg daily and Cardizem  mg daily. Blood pressure is mildly elevated today. Will need to monitor. Relevant Orders    CBC Auto Differential    Hyperlipidemia LDL goal <70     Well-controlled on Crestor 10 mg daily. Monitor labs. Relevant Medications    lisinopril (PRINIVIL;ZESTRIL) 40 MG tablet    Other Relevant Orders    Comprehensive Metabolic Panel    TSH with Reflex    Type 2 diabetes mellitus without complication, without long-term current use of insulin (HCC)     Continue Januvia 100 mg daily and metformin ER 1000 mg twice daily. May be able to decrease her metformin dose due to diarrhea. If affordable, may benefit from Trulicity or Jardiance due to history of heart disease. Encouraged to try to do better with her diet.   Information provided regarding diabetic diet and low-carb diet. Will also benefit from diabetes education once able (currently on hold due to Agustin). Relevant Orders    Comprehensive Metabolic Panel    Hemoglobin A1C    [facility] DIABETES FOOT EXAM (Completed)    Vaccine counseling     Patient has not had pneumonia vaccines. Discussed pneumonia vaccinations with her. Also discussed flu vaccines. Vitamin D insufficiency     Check vitamin D level. Relevant Orders    Vitamin D 25 Hydroxy          Current Outpatient Medications   Medication Sig Dispense Refill    vitamin D3 (CHOLECALCIFEROL) 25 MCG (1000 UT) TABS tablet Take 1,000 Units by mouth daily      vitamin B-12 (CYANOCOBALAMIN) 1000 MCG tablet Take 1,000 mcg by mouth daily      Multiple Vitamins-Minerals (CENTRUM SILVER) TABS Take by mouth      lisinopril (PRINIVIL;ZESTRIL) 40 MG tablet Take 40 mg by mouth daily      pantoprazole (PROTONIX) 40 MG tablet Take 1 tablet by mouth every morning (before breakfast) 30 tablet 5    nitroGLYCERIN (NITROSTAT) 0.4 MG SL tablet Place 1 tablet under the tongue every 5 minutes as needed for Chest pain 25 tablet 2    SITagliptin (JANUVIA) 100 MG tablet Take 1 tablet by mouth daily 30 tablet 3    metFORMIN (GLUCOPHAGE-XR) 500 MG extended release tablet Take 1,000 mg by mouth 2 times daily       aspirin 81 MG chewable tablet Take 1 tablet by mouth daily 30 tablet 3    rosuvastatin (CRESTOR) 10 MG tablet Take 1 tablet by mouth nightly 30 tablet 3    diltiazem (TIAZAC) 360 MG SR capsule TAKE 1 CAPSULE BY MOUTH ONE TIME A DAY 30 capsule 0     No current facility-administered medications for this visit. 45 minutes were spent with patient today in face to face encounter, more than 50% of it was counseling regarding DM options . Return in about 3 months (around 11/7/2020).

## 2020-08-16 PROBLEM — E53.8 B12 DEFICIENCY: Status: ACTIVE | Noted: 2020-08-16

## 2020-08-16 PROBLEM — E55.9 VITAMIN D INSUFFICIENCY: Status: ACTIVE | Noted: 2020-08-16

## 2020-08-16 PROBLEM — Z12.11 COLON CANCER SCREENING: Status: ACTIVE | Noted: 2020-08-16

## 2020-08-16 PROBLEM — R13.10 FOOD STICKS ON SWALLOWING: Status: ACTIVE | Noted: 2020-08-16

## 2020-08-17 NOTE — ASSESSMENT & PLAN NOTE
Patient has not had pneumonia vaccines. Discussed pneumonia vaccinations with her. Also discussed flu vaccines.

## 2020-08-17 NOTE — ASSESSMENT & PLAN NOTE
Patient states that initially her blood pressure was difficult to control. Continue lisinopril 40 mg daily and Cardizem  mg daily. Blood pressure is mildly elevated today. Will need to monitor.

## 2020-08-17 NOTE — ASSESSMENT & PLAN NOTE
Follows with Dr. Caty Ellis. Status post MI in April 2018 that was treated with PTCA and stenting. Asymptomatic at present. Currently on aspirin, lisinopril 40 mg daily, Cardizem  mg daily, and Crestor 10 mg daily with nitroglycerin sublingual as needed. Angina symptoms were dyspnea on exertion and stomach pain.

## 2020-08-17 NOTE — ASSESSMENT & PLAN NOTE
Status post MI in April 2018 that was treated with PTCA and stenting. Follows with Dr. Alexandre Crump. On ASA, lisinopril 40 mg qd, diltiazem  mg qd, and Crestor 10 mg qd with NTG SL prn. Sx of CAD were NULL and stomach pain.

## 2020-08-26 ENCOUNTER — TELEPHONE (OUTPATIENT)
Dept: INTERNAL MEDICINE CLINIC | Age: 70
End: 2020-08-26

## 2020-08-26 NOTE — TELEPHONE ENCOUNTER
Orders printed and mailed. Pt notified that it could take up to two weeks before she receives them in the mail.

## 2020-08-26 NOTE — TELEPHONE ENCOUNTER
Pt called made apt for her f/u 11/9 but would like order for labs be mailed to her so she can go to lab [facility] to have them drawn before her apt.

## 2020-09-10 NOTE — PROGRESS NOTES
[address]   Cardiac f/up    Referring Provider:  Robert Amaya DO     Chief Complaint   Patient presents with    Hypertension    CAD and chest pain  History of Present Illness:  Mrs. Emilia Oreilly is s/p acute posterior wall MI April 2018 (progressive exertional chest discomfort). LHC showed 100% OM1 which was stented. She is on Crestor for marked hyperlipidemia, has family history of premature heart disease. Today, she is doing okay. She has had some upper back pain that sounds musculoskeletal in nature. She is going to try Biofreeze on it to see if that helps with the pain. No associated symptoms with back pain. She denies shortness of breath, palpitations, dizziness, and edema. Past Medical History:   has a past medical history of Allergic rhinitis, Fibromyalgia, GERD (gastroesophageal reflux disease), Heart attack ([address] Heart disease, Herniated disc, Hyperlipidemia, Hypertension, and Type II or unspecified type diabetes mellitus without mention of complication, not stated as uncontrolled. Surgical History:   has a past surgical history that includes Coronary angioplasty with stent (2018) and Hymenectomy. Social History:   reports that she has never smoked. She has never used smokeless tobacco. She reports that she does not drink alcohol or use drugs. Family History:  family history includes Cancer in her father; Diabetes in her sister; Heart Disease in her mother; Thyroid Disease in her daughter. Home Medications:  Prior to Admission medications    Medication Sig Start Date End Date Taking?  Authorizing Provider   vitamin D3 (CHOLECALCIFEROL) 25 MCG (1000 UT) TABS tablet Take 1,000 Units by mouth daily   Yes Historical Provider, MD   vitamin B-12 (CYANOCOBALAMIN) 1000 MCG tablet Take 1,000 mcg by mouth daily 1/15/20  Yes Historical Provider, MD   Multiple Vitamins-Minerals (CENTRUM SILVER) TABS Take by mouth   Yes Historical Provider, MD   lisinopril (PRINIVIL;ZESTRIL) 40 MG tablet Take 40 mg by mouth daily   Yes Historical Provider, MD   pantoprazole (PROTONIX) 40 MG tablet Take 1 tablet by mouth every morning (before breakfast) 8/7/20  Yes Neel Duffy DO   nitroGLYCERIN (NITROSTAT) 0.4 MG SL tablet Place 1 tablet under the tongue every 5 minutes as needed for Chest pain 8/16/19  Yes Jose Luis Martinez MD   SITagliptin (JANUVIA) 100 MG tablet Take 1 tablet by mouth daily 4/15/19  Yes Asa Slade MD   metFORMIN (GLUCOPHAGE-XR) 500 MG extended release tablet Take 1,000 mg by mouth 2 times daily  7/10/18  Yes Historical Provider, MD   aspirin 81 MG chewable tablet Take 1 tablet by mouth daily 4/22/18  Yes YAYA Yap CNP   rosuvastatin (CRESTOR) 10 MG tablet Take 1 tablet by mouth nightly 4/22/18  Yes YAYA Yap CNP   diltiazem (TIAZAC) 360 MG SR capsule TAKE 1 CAPSULE BY MOUTH ONE TIME A DAY 12/24/13  Yes Anna Schafer MD      Allergies:  Scharlene Juares arthritis strength-antacid [aspirin buff, al hyd-mg hyd]; Aspirin; Atorvastatin; Erythromycin base; Macrolides and ketolides; Sulfa antibiotics; Troleandomycin; and Verapamil     Review of Systems:   · Constitutional: there has been no unanticipated weight loss. There's been no change in energy level, sleep pattern, or activity level. · Eyes: No visual changes or diplopia. No scleral icterus. · ENT: No Headaches, hearing loss or vertigo. No mouth sores or sore throat. · Cardiovascular: Reviewed in HPI  · Respiratory: No cough or wheezing, no sputum production. No hematemesis. · Gastrointestinal: No abdominal pain, appetite loss, blood in stools. No change in bowel or bladder habits. · Genitourinary: No dysuria, trouble voiding, or hematuria. · Musculoskeletal:  No gait disturbance, weakness or joint complaints. +Back pain  · Integumentary: No rash or pruritis. · Neurological: No headache, diplopia, change in muscle strength, numbness or tingling.  No change in gait, balance, coordination, mood, affect, memory, mentation, behavior. · Psychiatric: No anxiety, no depression. · Endocrine: No malaise, fatigue or temperature intolerance. No excessive thirst, fluid intake, or urination. No tremor. · Hematologic/Lymphatic: No abnormal bruising or bleeding, blood clots or swollen lymph nodes. · Allergic/Immunologic: No nasal congestion or hives. Physical Examination:    Vitals:    09/11/20 1320   BP: 124/66   Pulse: 80        Constitutional and General Appearance: Appears stated age, NAD   Skin:good turgor,intact without lesions  HEENT: EOMI ,normal  Neck:no JVD    Respiratory:  · Normal excursion and expansion without use of accessory muscles  · Resp Auscultation: Normal breath sounds without dullness  Cardiovascular:  · The apical impulses not displaced  · Heart tones are crisp and normal  · Cervical veins are not engorged  · The carotid upstroke is normal in amplitude and contour without delay or bruit  · Peripheral pulses are symmetrical and full  · There is no clubbing, cyanosis of the extremities. · No edema  · Femoral Arteries: 2+ and equal  · Pedal Pulses: 2+ and equal   Abdomen:  · No masses or tenderness  · Liver/Spleen: No Abnormalities Noted  Neurological/Psychiatric:  · Alert and oriented in all spheres  · Moves all extremities well  · Exhibits normal gait balance and coordination  · No abnormalities of mood, affect, memory, mentation, or behavior are noted    Angiogram 4/20/18  Acute inferior posterior wall MI  Cath with 40-50% mid RCA,100% OM1,EF 50% with mild inferior wall hypokinesis. Trop 2.99.     PCI with 2.5 x 15 balloon then 2.5 x 18 manasa SHARIF stent to 16 hitesh with excellent angiographic result. Small distal branch with embolization. Will continue aaggrastat x 12 hours. Plavix given.  She has been on crestor for lipid management,need to give in house  ECG-8/16/19 for chest pain  with borderline short OR interval otherwise normal-done for chest pain    Nuclear stress 3/4/19      Summary     Small sized lateral fixed defect of mild intensity consistent with     infarction in the territory of the mid and distal LCx .     Normal LV size and systolic function.               Assessment:     CAD (posterior wall MI April 2018)  [facility] 4/20/18> SHARIF to [address]  Family history of premature heart disease. Stable, no anginal symptoms. Had GI symptoms (upper abd pain) that radiated to neck and back with prior stenting  Switch 81 mg ASA to enteric coated    Hypertension  Stable. Blood pressure 124/66, pulse 80, height 5' 4\" (1.626 m), weight 138 lb (62.6 kg). Hyperlipidemia  Takes Crestor 10mg  8/4/20> , , HDL 47, LDL 66, ALT 18 AST 18     Carotid dopplers 6/14/18 (Care Everywhere)> less than 50% mary  Abd u/s 6/3/17> Negative for aneurysm    Palpitations -risk for accessory pathway conduction  MCOT 9/24/19> SR/PAT - controlled         Plan:  Colette Hill has a stable cardiac status. Cardiac test and lab results personally reviewed by me during this office visit and discussed. Switch Aspirin to enteric coated  Continue other medications  Follow up in 6 months        I appreciate the opportunity of cooperating in the care of this individual.    Amber Gamble. Nayana Carroll M.D., [facility]      Patient's problem list, medications, allergies, past medical, surgical, social and family histories were reviewed and updated as appropriate. Scribe's Attestation: This note was scribed in the presence of Dr. Nayana Carroll MD by Ricco Mccloud RN. The scribe's documentation has been prepared under my direction and personally reviewed by me in its entirety. I confirm that the note above accurately reflects all work, treatment, procedures, and medical decision making performed by me.

## 2020-09-11 ENCOUNTER — OFFICE VISIT (OUTPATIENT)
Dept: CARDIOLOGY CLINIC | Age: 70
End: 2020-09-11
Payer: MEDICARE

## 2020-09-11 VITALS
HEART RATE: 80 BPM | HEIGHT: 64 IN | SYSTOLIC BLOOD PRESSURE: 124 MMHG | BODY MASS INDEX: 23.56 KG/M2 | DIASTOLIC BLOOD PRESSURE: 66 MMHG | WEIGHT: 138 LBS

## 2020-09-11 PROCEDURE — G8427 DOCREV CUR MEDS BY ELIG CLIN: HCPCS | Performed by: INTERNAL MEDICINE

## 2020-09-11 PROCEDURE — G8399 PT W/DXA RESULTS DOCUMENT: HCPCS | Performed by: INTERNAL MEDICINE

## 2020-09-11 PROCEDURE — 3017F COLORECTAL CA SCREEN DOC REV: CPT | Performed by: INTERNAL MEDICINE

## 2020-09-11 PROCEDURE — 4040F PNEUMOC VAC/ADMIN/RCVD: CPT | Performed by: INTERNAL MEDICINE

## 2020-09-11 PROCEDURE — 1036F TOBACCO NON-USER: CPT | Performed by: INTERNAL MEDICINE

## 2020-09-11 PROCEDURE — G8420 CALC BMI NORM PARAMETERS: HCPCS | Performed by: INTERNAL MEDICINE

## 2020-09-11 PROCEDURE — 1090F PRES/ABSN URINE INCON ASSESS: CPT | Performed by: INTERNAL MEDICINE

## 2020-09-11 PROCEDURE — 1123F ACP DISCUSS/DSCN MKR DOCD: CPT | Performed by: INTERNAL MEDICINE

## 2020-09-11 PROCEDURE — 99214 OFFICE O/P EST MOD 30 MIN: CPT | Performed by: INTERNAL MEDICINE

## 2020-09-11 RX ORDER — ASPIRIN 81 MG/1
81 TABLET ORAL DAILY
Qty: 90 TABLET | Refills: 1 | COMMUNITY
Start: 2020-09-11

## 2020-09-15 PROBLEM — Z12.11 COLON CANCER SCREENING: Status: RESOLVED | Noted: 2020-08-16 | Resolved: 2020-09-15

## 2020-10-12 ENCOUNTER — TELEPHONE (OUTPATIENT)
Dept: INTERNAL MEDICINE CLINIC | Age: 70
End: 2020-10-12

## 2020-10-12 NOTE — TELEPHONE ENCOUNTER
----- Message from Manish Palacios sent at 10/12/2020  9:17 AM EDT -----  Subject: Message to Provider    QUESTIONS  Information for Provider? New medication SITagliptin (Januvia)100mg tab   take 1 tablet by mouth daily   I cannot afford this medication is there anything else that you can   prescribe that is more affordable   she has 2 pills left   what are the side effects if I just stop taking it?   ---------------------------------------------------------------------------  --------------  CALL BACK INFO  What is the best way for the office to contact you? Do not leave any   message   patient will call back for answer  Preferred Call Back Phone Number? [phone]  ---------------------------------------------------------------------------  --------------  SCRIPT ANSWERS  Relationship to Patient?  Self

## 2020-10-13 RX ORDER — PIOGLITAZONEHYDROCHLORIDE 15 MG/1
15 TABLET ORAL DAILY
Qty: 30 TABLET | Refills: 3 | Status: SHIPPED
Start: 2020-10-13 | End: 2020-11-09

## 2020-10-13 NOTE — TELEPHONE ENCOUNTER
Will change to pioglitizone but can you also see if she qualifies for patient assistance for Januvia. [facility]      Sent to [facility] on [employer]. saw

## 2020-10-14 RX ORDER — METFORMIN HYDROCHLORIDE 500 MG/1
1000 TABLET, EXTENDED RELEASE ORAL 2 TIMES DAILY
Qty: 360 TABLET | Refills: 3 | Status: SHIPPED | OUTPATIENT
Start: 2020-10-14 | End: 2021-02-04 | Stop reason: SDUPTHER

## 2020-11-04 RX ORDER — DILTIAZEM HYDROCHLORIDE 360 MG/1
CAPSULE, EXTENDED RELEASE ORAL
Qty: 90 CAPSULE | Refills: 3 | Status: SHIPPED | OUTPATIENT
Start: 2020-11-04 | End: 2021-02-04 | Stop reason: SDUPTHER

## 2020-11-04 NOTE — TELEPHONE ENCOUNTER
Pt called for refill on Diltiazem    Pt states only have one pill left.      Please call pt if refilled    [facility] [place]

## 2020-11-09 ENCOUNTER — OFFICE VISIT (OUTPATIENT)
Dept: INTERNAL MEDICINE CLINIC | Age: 70
End: 2020-11-09
Payer: MEDICARE

## 2020-11-09 VITALS
DIASTOLIC BLOOD PRESSURE: 80 MMHG | WEIGHT: 138.6 LBS | BODY MASS INDEX: 23.79 KG/M2 | HEART RATE: 92 BPM | TEMPERATURE: 97.3 F | SYSTOLIC BLOOD PRESSURE: 176 MMHG | OXYGEN SATURATION: 98 %

## 2020-11-09 PROCEDURE — 3051F HG A1C>EQUAL 7.0%<8.0%: CPT | Performed by: INTERNAL MEDICINE

## 2020-11-09 PROCEDURE — 2022F DILAT RTA XM EVC RTNOPTHY: CPT | Performed by: INTERNAL MEDICINE

## 2020-11-09 PROCEDURE — 4040F PNEUMOC VAC/ADMIN/RCVD: CPT | Performed by: INTERNAL MEDICINE

## 2020-11-09 PROCEDURE — 1123F ACP DISCUSS/DSCN MKR DOCD: CPT | Performed by: INTERNAL MEDICINE

## 2020-11-09 PROCEDURE — 1036F TOBACCO NON-USER: CPT | Performed by: INTERNAL MEDICINE

## 2020-11-09 PROCEDURE — 99214 OFFICE O/P EST MOD 30 MIN: CPT | Performed by: INTERNAL MEDICINE

## 2020-11-09 PROCEDURE — G8484 FLU IMMUNIZE NO ADMIN: HCPCS | Performed by: INTERNAL MEDICINE

## 2020-11-09 PROCEDURE — 1090F PRES/ABSN URINE INCON ASSESS: CPT | Performed by: INTERNAL MEDICINE

## 2020-11-09 PROCEDURE — 3017F COLORECTAL CA SCREEN DOC REV: CPT | Performed by: INTERNAL MEDICINE

## 2020-11-09 PROCEDURE — G8420 CALC BMI NORM PARAMETERS: HCPCS | Performed by: INTERNAL MEDICINE

## 2020-11-09 PROCEDURE — G8399 PT W/DXA RESULTS DOCUMENT: HCPCS | Performed by: INTERNAL MEDICINE

## 2020-11-09 PROCEDURE — G8427 DOCREV CUR MEDS BY ELIG CLIN: HCPCS | Performed by: INTERNAL MEDICINE

## 2020-11-09 RX ORDER — GLIPIZIDE 5 MG/1
5 TABLET ORAL 2 TIMES DAILY
Qty: 60 TABLET | Refills: 3 | Status: SHIPPED | OUTPATIENT
Start: 2020-11-09 | End: 2021-01-28

## 2020-11-09 RX ORDER — ROSUVASTATIN CALCIUM 10 MG/1
10 TABLET, COATED ORAL NIGHTLY
Qty: 90 TABLET | Refills: 3 | Status: SHIPPED | OUTPATIENT
Start: 2020-11-09 | End: 2021-02-04 | Stop reason: SDUPTHER

## 2020-11-09 ASSESSMENT — ENCOUNTER SYMPTOMS
ABDOMINAL PAIN: 0
CHEST TIGHTNESS: 0
DIARRHEA: 0
SHORTNESS OF BREATH: 0
CONSTIPATION: 0
TROUBLE SWALLOWING: 1

## 2020-11-09 NOTE — Clinical Note
Can you please help patient with patient assistance forms for Rodolfo? She is in the donut hole and cannot afford it. She does not tolerate Metformin. She is not a candidate for Actos given her heart history.  Thanks

## 2020-11-09 NOTE — PROGRESS NOTES
Patient: Pratik Eid is a 69 y.o. female who presents today with the following Chief Complaint(s):  Chief Complaint   Patient presents with    Check-Up       HPI     Here today for follow up. Does not want a flu vaccine as her  got \"so sick\" after getting a flu vaccine once. Is c/o arthritis pain and back and neck pain . Has not been taking anything for pain except 1 ASA qd which she takes for CAD. Has also been having some numbness in her fingers over night, especially right fingers 2 & 3 on the right and left 2nd finger is sticking. Having some issues with her esophagus- has h/o esophageal dysmotility. Feels like she is having difficulty swallowing. Did not follow through with referral to GI. Her  has been having issues and she has not taken care of herself in order to take care of him. DM- sugars have been \"not too good\". Several times is over 200-300. Did not start taking Actos. Could not afford Januvia. Is now in the donut hole. Is no longer exercising d/t COVID pandemic. Labs are pending from [address] [place] [address]- no side effects with Crestor. Labs are pending from [address] [place] [address].     Had labs done at [address] [place] [address] on Saturday 11/7/2020. HTN- was nervous coming into the office today- got caught in traffic coming to the office this morning. Does monitor her BP at home and is not typically high. CAD- stable. No chest pain. Did see Dr. Jacquelyn Peres in September with no changes. Has also noticed a slowing of her urine stream. Mostly at night but also during the day. Is able to empty her bladder but is taking longer. No recent bladder infections. Allergies   Allergen Reactions    Asa Arthritis Strength-Antacid [Aspirin Buff, Al Hyd-Mg Hyd] Nausea Only     Per patient stopped taking because it upset her stomach. She denies itching, swelling, rash.      Aspirin      Other reaction(s): GI Upset    Atorvastatin      Other reaction(s): Muscle Aches    Erythromycin Base Hives    Macrolides And Ketolides     Troleandomycin     Verapamil      Other reaction(s):  Other (See Comments)  Low heart rate     Sulfa Antibiotics Rash      Past Medical History:   Diagnosis Date    Allergic rhinitis     Fibromyalgia     GERD (gastroesophageal reflux disease)     Heart attack ([address]     Heart disease     Herniated disc     Hyperlipidemia     Hypertension     Type II or unspecified type diabetes mellitus without mention of complication, not stated as uncontrolled       Past Surgical History:   Procedure Laterality Date    CORONARY ANGIOPLASTY WITH STENT PLACEMENT  2018    HYMENECTOMY        Social History     Socioeconomic History    Marital status:      Spouse name: Not on file    Number of children: Not on file    Years of education: Not on file    Highest education level: Not on file   Occupational History    Not on file   Social Needs    Financial resource strain: Not hard at all   [employer] insecurity     Worry: Never true     Inability: Never true   [employer] needs     Medical: No     Non-medical: No   Tobacco Use    Smoking status: Never Smoker    Smokeless tobacco: Never Used   Substance and Sexual Activity    Alcohol use: No    Drug use: No    Sexual activity: Not on file     Comment:    Lifestyle    Physical activity     Days per week: Not on file     Minutes per session: Not on file    Stress: Not on file   Relationships    Social connections     Talks on phone: Not on file     Gets together: Not on file     Attends Faith service: Not on file     Active member of club or organization: Not on file     Attends meetings of clubs or organizations: Not on file     Relationship status: Not on file    Intimate partner violence     Fear of current or ex partner: Not on file     Emotionally abused: Not on file     Physically abused: Not on file     Forced sexual activity: Not on file   Other Topics Concern    Not on file   Social History Narrative    Not on file constipation and diarrhea. Endocrine: Negative for cold intolerance, heat intolerance, polydipsia, polyphagia and polyuria. No low blood sugars   Skin: Negative for rash. BP (!) 176/80 (Site: Right Upper Arm, Position: Sitting, Cuff Size: Medium Adult)   Pulse 92   Temp 97.3 °F (36.3 °C)   Wt 138 lb 9.6 oz (62.9 kg)   SpO2 98%   BMI 23.79 kg/m²   Physical Exam  Vitals signs and nursing note reviewed. Constitutional:       Appearance: She is well-developed. She is not toxic-appearing. HENT:      Head: Normocephalic. Right Ear: Tympanic membrane, ear canal and external ear normal.      Left Ear: Tympanic membrane, ear canal and external ear normal.      Nose: Nose normal.   Eyes:      General: No scleral icterus. Extraocular Movements: Extraocular movements intact. Pupils: Pupils are equal, round, and reactive to light. Neck:      Thyroid: No thyroid mass or thyromegaly. Vascular: No carotid bruit. Cardiovascular:      Rate and Rhythm: Normal rate and regular rhythm. Pulses:           Dorsalis pedis pulses are 2+ on the right side and 2+ on the left side. Heart sounds: Normal heart sounds. No murmur. Comments: No LE edema  Pulmonary:      Effort: Pulmonary effort is normal.      Breath sounds: Normal breath sounds. Lymphadenopathy:      Cervical: No cervical adenopathy. Neurological:      General: No focal deficit present. Mental Status: She is alert and oriented to person, place, and time. Psychiatric:         Mood and Affect: Mood normal.         Behavior: Behavior normal. Behavior is cooperative. ASSESSMENT/PLAN:    Problem List Items Addressed This Visit     Carpal tunnel syndrome on right     Recommend use of nocturnal wrist splint. Consider referral to OT if symptoms progress.          Relevant Medications    Elastic Bandages & Supports (CARPAL TUNNEL WRIST DELUXE) MISC    Coronary artery disease involving native coronary artery of native heart without angina pectoris     . Follows with Dr. Stanley Ferreira when she last on September 2020 and made no changes to her treatment. Remains on aspirin, lisinopril 40 mg daily, Cardizem  mg daily, and Crestor 10 mg daily with nitroglycerin as needed. Relevant Medications    rosuvastatin (CRESTOR) 10 MG tablet    Food sticks on swallowing     Encouraged to follow through with referral made to GI. She has history of esophageal dysmotility and is having increasing difficulty with swallowing. GERD (gastroesophageal reflux disease)     Encouraged to follow through with referral placed to GI. Continue Protonix 40 mg daily. Likely requires an EGD as she is having increasing difficulty swallowing. HTN (hypertension)     Elevated today but is typically well controlled at home. She did hit traffic on her way into the office today. Blood pressures in September were excellent. Continue lisinopril 40 mg daily and Cardizem  mg daily. Hyperlipidemia LDL goal <70     Continue Crestor 10 mg daily. Labs from [facility] are pending. Relevant Medications    rosuvastatin (CRESTOR) 10 MG tablet    Trigger index finger of left hand     Patient does not wish referral to orthopedics at this time but will let me know if her symptoms worsen and she would like to see Ortho. .         Type 2 diabetes mellitus without complication, without long-term current use of insulin ([facility] [address] - Primary     Labs from [facility] are pending. She is not able to take Januvia due to cost.  She is not a good candidate for Actos due to heart history. She does not tolerate metformin due to diarrhea. We will check with nurse care coordinator to see if we can help patient get assistance for Januvia. Relevant Medications    glipiZIDE (GLUCOTROL) 5 MG tablet    Other Relevant Orders    Comprehensive Metabolic Panel    Hemoglobin A1C    Vaccine counseling     Declines flu vaccine.  Reviewed that flu vaccine is a dead vaccine, that it will not give her the flu. Reviewed s/s of immune reaction (low-grade temp, headache, mild arthralgias lasting a few days following vaccine) is different than influenza. Reviewed potential significant morbidity and mortality associated with influenza. Also reviewed importance of flu vaccination during COVID-19 pandemic in terms of reducing fluid community and reducing hospitalizations associated with influenza. Continues to decline flu vaccine. Current Outpatient Medications   Medication Sig Dispense Refill    glipiZIDE (GLUCOTROL) 5 MG tablet Take 1 tablet by mouth 2 times daily 60 tablet 3    rosuvastatin (CRESTOR) 10 MG tablet Take 1 tablet by mouth nightly 90 tablet 3    Elastic Bandages & Supports (CARPAL TUNNEL WRIST DELUXE) MISC 1 each by Does not apply route nightly 1 each 0    dilTIAZem (TIAZAC) 360 MG extended release capsule TAKE 1 CAPSULE BY MOUTH ONE TIME A DAY 90 capsule 3    metFORMIN (GLUCOPHAGE-XR) 500 MG extended release tablet Take 2 tablets by mouth 2 times daily 360 tablet 3    aspirin EC 81 MG EC tablet Take 1 tablet by mouth daily 90 tablet 1    vitamin D3 (CHOLECALCIFEROL) 25 MCG (1000 UT) TABS tablet Take 1,000 Units by mouth daily      vitamin B-12 (CYANOCOBALAMIN) 1000 MCG tablet Take 1,000 mcg by mouth daily      Multiple Vitamins-Minerals (CENTRUM SILVER) TABS Take by mouth      lisinopril (PRINIVIL;ZESTRIL) 40 MG tablet Take 40 mg by mouth daily      pantoprazole (PROTONIX) 40 MG tablet Take 1 tablet by mouth every morning (before breakfast) 30 tablet 5    nitroGLYCERIN (NITROSTAT) 0.4 MG SL tablet Place 1 tablet under the tongue every 5 minutes as needed for Chest pain 25 tablet 2     No current facility-administered medications for this visit. Return in about 3 months (around 2/9/2021).

## 2020-11-15 PROBLEM — G56.01 CARPAL TUNNEL SYNDROME ON RIGHT: Status: ACTIVE | Noted: 2020-11-15

## 2020-11-15 PROBLEM — M65.322 TRIGGER INDEX FINGER OF LEFT HAND: Status: ACTIVE | Noted: 2020-11-15

## 2020-11-15 NOTE — ASSESSMENT & PLAN NOTE
Declines flu vaccine. Reviewed that flu vaccine is a dead vaccine, that it will not give her the flu. Reviewed s/s of immune reaction (low-grade temp, headache, mild arthralgias lasting a few days following vaccine) is different than influenza. Reviewed potential significant morbidity and mortality associated with influenza. Also reviewed importance of flu vaccination during COVID-19 pandemic in terms of reducing fluid community and reducing hospitalizations associated with influenza. Continues to decline flu vaccine.

## 2020-11-15 NOTE — ASSESSMENT & PLAN NOTE
Elevated today but is typically well controlled at home. She did hit traffic on her way into the office today. Blood pressures in September were excellent. Continue lisinopril 40 mg daily and Cardizem  mg daily.

## 2020-11-15 NOTE — ASSESSMENT & PLAN NOTE
Encouraged to follow through with referral made to GI. She has history of esophageal dysmotility and is having increasing difficulty with swallowing.

## 2020-11-15 NOTE — ASSESSMENT & PLAN NOTE
Labs from [facility] are pending. She is not able to take Januvia due to cost.  She is not a good candidate for Actos due to heart history. She does not tolerate metformin due to diarrhea. We will check with nurse care coordinator to see if we can help patient get assistance for Januvia.

## 2020-11-15 NOTE — ASSESSMENT & PLAN NOTE
Patient does not wish referral to orthopedics at this time but will let me know if her symptoms worsen and she would like to see Ortho. Augusto Duque

## 2020-11-15 NOTE — ASSESSMENT & PLAN NOTE
.  Follows with Dr. Mario Alberto Leon when she last on September 2020 and made no changes to her treatment. Remains on aspirin, lisinopril 40 mg daily, Cardizem  mg daily, and Crestor 10 mg daily with nitroglycerin as needed.

## 2020-11-15 NOTE — ASSESSMENT & PLAN NOTE
Encouraged to follow through with referral placed to GI. Continue Protonix 40 mg daily. Likely requires an EGD as she is having increasing difficulty swallowing.

## 2020-12-01 ENCOUNTER — TELEPHONE (OUTPATIENT)
Dept: RHEUMATOLOGY | Age: 70
End: 2020-12-01

## 2020-12-02 NOTE — TELEPHONE ENCOUNTER
HbA1c was 7%- no change in medication; - liver and kidneys were normal. TSH was normal. CBC was normal. B-12 low-normal. Add OTC vitamin B-12 1,000 units qd. saw

## 2021-01-28 RX ORDER — GLIPIZIDE 5 MG/1
TABLET ORAL
Qty: 180 TABLET | Refills: 2 | Status: SHIPPED | OUTPATIENT
Start: 2021-01-28 | End: 2021-02-04 | Stop reason: SDUPTHER

## 2021-02-04 ENCOUNTER — TELEPHONE (OUTPATIENT)
Dept: INTERNAL MEDICINE CLINIC | Age: 71
End: 2021-02-04

## 2021-02-04 DIAGNOSIS — R13.10 FOOD STICKS ON SWALLOWING: ICD-10-CM

## 2021-02-04 DIAGNOSIS — K21.9 GASTROESOPHAGEAL REFLUX DISEASE: ICD-10-CM

## 2021-02-04 DIAGNOSIS — E11.9 TYPE 2 DIABETES MELLITUS WITHOUT COMPLICATION, WITHOUT LONG-TERM CURRENT USE OF INSULIN (HCC): Primary | ICD-10-CM

## 2021-02-04 RX ORDER — METFORMIN HYDROCHLORIDE 500 MG/1
1000 TABLET, EXTENDED RELEASE ORAL 2 TIMES DAILY
Qty: 360 TABLET | Refills: 3 | Status: SHIPPED | OUTPATIENT
Start: 2021-02-04 | End: 2021-10-20 | Stop reason: SDUPTHER

## 2021-02-04 RX ORDER — GLIPIZIDE 5 MG/1
5 TABLET ORAL
Qty: 180 TABLET | Refills: 3 | Status: SHIPPED | OUTPATIENT
Start: 2021-02-04 | End: 2021-02-22 | Stop reason: SDUPTHER

## 2021-02-04 RX ORDER — DILTIAZEM HYDROCHLORIDE 360 MG/1
360 CAPSULE, EXTENDED RELEASE ORAL DAILY
Qty: 90 CAPSULE | Refills: 3 | Status: SHIPPED | OUTPATIENT
Start: 2021-02-04 | End: 2021-03-10

## 2021-02-04 RX ORDER — LISINOPRIL 40 MG/1
40 TABLET ORAL DAILY
Qty: 90 TABLET | Refills: 3 | Status: SHIPPED | OUTPATIENT
Start: 2021-02-04 | End: 2021-03-10

## 2021-02-04 RX ORDER — ROSUVASTATIN CALCIUM 10 MG/1
10 TABLET, COATED ORAL NIGHTLY
Qty: 90 TABLET | Refills: 3 | Status: SHIPPED | OUTPATIENT
Start: 2021-02-04 | End: 2022-03-02

## 2021-02-04 RX ORDER — GLUCOSAMINE HCL/CHONDROITIN SU 500-400 MG
CAPSULE ORAL
Qty: 100 STRIP | Refills: 3 | Status: SHIPPED | OUTPATIENT
Start: 2021-02-04 | End: 2021-02-22 | Stop reason: SDUPTHER

## 2021-02-04 RX ORDER — PANTOPRAZOLE SODIUM 40 MG/1
40 TABLET, DELAYED RELEASE ORAL
Qty: 90 TABLET | Refills: 3 | Status: SHIPPED | OUTPATIENT
Start: 2021-02-04 | End: 2021-10-20 | Stop reason: ALTCHOICE

## 2021-02-04 NOTE — TELEPHONE ENCOUNTER
Pt called this morning asking for a refill on her Lisinopril 40 mg. She said she called [facility] to request a refill. They said they would send th  a request. She called the pharm again and they told her the the dr had not responded to the request. She is needing the refill. If you have any questions please call her.

## 2021-02-04 NOTE — TELEPHONE ENCOUNTER
Patient notified. She is requesting script for diabetic test strips. Script sent to [facility].

## 2021-02-12 ENCOUNTER — TELEPHONE (OUTPATIENT)
Dept: INTERNAL MEDICINE CLINIC | Age: 71
End: 2021-02-12

## 2021-02-12 RX ORDER — GLUCOSAMINE HCL/CHONDROITIN SU 500-400 MG
CAPSULE ORAL
Qty: 100 STRIP | Refills: 3 | Status: SHIPPED | OUTPATIENT
Start: 2021-02-12

## 2021-02-12 NOTE — TELEPHONE ENCOUNTER
Patient would like more Test strips (Acu check) and to have them sent to the  [facility] in [place].

## 2021-02-22 ENCOUNTER — OFFICE VISIT (OUTPATIENT)
Dept: INTERNAL MEDICINE CLINIC | Age: 71
End: 2021-02-22
Payer: MEDICARE

## 2021-02-22 VITALS
SYSTOLIC BLOOD PRESSURE: 170 MMHG | BODY MASS INDEX: 24.2 KG/M2 | OXYGEN SATURATION: 98 % | DIASTOLIC BLOOD PRESSURE: 54 MMHG | HEART RATE: 116 BPM | WEIGHT: 141 LBS

## 2021-02-22 DIAGNOSIS — Z86.16 HISTORY OF COVID-19: ICD-10-CM

## 2021-02-22 DIAGNOSIS — Z71.85 VACCINE COUNSELING: ICD-10-CM

## 2021-02-22 DIAGNOSIS — E78.5 HYPERLIPIDEMIA LDL GOAL <70: ICD-10-CM

## 2021-02-22 DIAGNOSIS — E11.9 TYPE 2 DIABETES MELLITUS WITHOUT COMPLICATION, WITHOUT LONG-TERM CURRENT USE OF INSULIN (HCC): Primary | ICD-10-CM

## 2021-02-22 DIAGNOSIS — I10 ESSENTIAL HYPERTENSION: ICD-10-CM

## 2021-02-22 DIAGNOSIS — E11.9 TYPE 2 DIABETES MELLITUS WITHOUT COMPLICATION, WITHOUT LONG-TERM CURRENT USE OF INSULIN (HCC): ICD-10-CM

## 2021-02-22 DIAGNOSIS — I25.10 CORONARY ARTERY DISEASE INVOLVING NATIVE CORONARY ARTERY OF NATIVE HEART WITHOUT ANGINA PECTORIS: ICD-10-CM

## 2021-02-22 LAB
A/G RATIO: 1.9 (ref 1.1–2.2)
ALBUMIN SERPL-MCNC: 4.7 G/DL (ref 3.4–5)
ALP BLD-CCNC: 75 U/L (ref 40–129)
ALT SERPL-CCNC: 15 U/L (ref 10–40)
ANION GAP SERPL CALCULATED.3IONS-SCNC: 15 MMOL/L (ref 3–16)
AST SERPL-CCNC: 16 U/L (ref 15–37)
BILIRUB SERPL-MCNC: 0.3 MG/DL (ref 0–1)
BUN BLDV-MCNC: 15 MG/DL (ref 7–20)
CALCIUM SERPL-MCNC: 9.7 MG/DL (ref 8.3–10.6)
CHLORIDE BLD-SCNC: 99 MMOL/L (ref 99–110)
CO2: 25 MMOL/L (ref 21–32)
CREAT SERPL-MCNC: <0.5 MG/DL (ref 0.6–1.2)
GFR AFRICAN AMERICAN: >60
GFR NON-AFRICAN AMERICAN: >60
GLOBULIN: 2.5 G/DL
GLUCOSE BLD-MCNC: 216 MG/DL (ref 70–99)
HBA1C MFR BLD: 6.4 %
POTASSIUM SERPL-SCNC: 4.1 MMOL/L (ref 3.5–5.1)
SODIUM BLD-SCNC: 139 MMOL/L (ref 136–145)
TOTAL PROTEIN: 7.2 G/DL (ref 6.4–8.2)

## 2021-02-22 PROCEDURE — 4040F PNEUMOC VAC/ADMIN/RCVD: CPT | Performed by: INTERNAL MEDICINE

## 2021-02-22 PROCEDURE — G8399 PT W/DXA RESULTS DOCUMENT: HCPCS | Performed by: INTERNAL MEDICINE

## 2021-02-22 PROCEDURE — G8420 CALC BMI NORM PARAMETERS: HCPCS | Performed by: INTERNAL MEDICINE

## 2021-02-22 PROCEDURE — 2022F DILAT RTA XM EVC RTNOPTHY: CPT | Performed by: INTERNAL MEDICINE

## 2021-02-22 PROCEDURE — 1036F TOBACCO NON-USER: CPT | Performed by: INTERNAL MEDICINE

## 2021-02-22 PROCEDURE — 83036 HEMOGLOBIN GLYCOSYLATED A1C: CPT | Performed by: INTERNAL MEDICINE

## 2021-02-22 PROCEDURE — 3017F COLORECTAL CA SCREEN DOC REV: CPT | Performed by: INTERNAL MEDICINE

## 2021-02-22 PROCEDURE — 1123F ACP DISCUSS/DSCN MKR DOCD: CPT | Performed by: INTERNAL MEDICINE

## 2021-02-22 PROCEDURE — G8427 DOCREV CUR MEDS BY ELIG CLIN: HCPCS | Performed by: INTERNAL MEDICINE

## 2021-02-22 PROCEDURE — G8484 FLU IMMUNIZE NO ADMIN: HCPCS | Performed by: INTERNAL MEDICINE

## 2021-02-22 PROCEDURE — 1090F PRES/ABSN URINE INCON ASSESS: CPT | Performed by: INTERNAL MEDICINE

## 2021-02-22 PROCEDURE — 99214 OFFICE O/P EST MOD 30 MIN: CPT | Performed by: INTERNAL MEDICINE

## 2021-02-22 PROCEDURE — 3046F HEMOGLOBIN A1C LEVEL >9.0%: CPT | Performed by: INTERNAL MEDICINE

## 2021-02-22 RX ORDER — GLUCOSAMINE HCL/CHONDROITIN SU 500-400 MG
CAPSULE ORAL
Qty: 100 STRIP | Refills: 3 | Status: SHIPPED | OUTPATIENT
Start: 2021-02-22 | End: 2021-11-11 | Stop reason: SDUPTHER

## 2021-02-22 RX ORDER — HYDRALAZINE HYDROCHLORIDE 25 MG/1
25 TABLET, FILM COATED ORAL 2 TIMES DAILY
Qty: 60 TABLET | Refills: 3 | Status: SHIPPED | OUTPATIENT
Start: 2021-02-22 | End: 2021-03-10 | Stop reason: ALTCHOICE

## 2021-02-22 RX ORDER — GLIPIZIDE 5 MG/1
5 TABLET ORAL
Qty: 90 TABLET | Refills: 3
Start: 2021-02-22 | End: 2021-06-21 | Stop reason: SDUPTHER

## 2021-02-22 ASSESSMENT — ENCOUNTER SYMPTOMS
DIARRHEA: 0
CONSTIPATION: 0
SHORTNESS OF BREATH: 0
CHEST TIGHTNESS: 0

## 2021-02-22 NOTE — ASSESSMENT & PLAN NOTE
Above goal.  Start hydralazine 25 mg twice daily. Continue lisinopril 40 mg daily and Cardizem  mg daily.

## 2021-02-22 NOTE — PROGRESS NOTES
Patient: Chloé Shirley is a 69 y.o. female who presents today with the following Chief Complaint(s):  Chief Complaint   Patient presents with    Check-Up       HPI     Here today for follow up. Was ill in January- sneezing, severe HA, ST, very fatigued. No difficulty breathing. Mild fever off and on. \"horrible, bad cough\". Lost her sense of smell x 3 days, no loss of taste. Worst of the symptoms lasted 4-5 days. Did not call or get tested for COVID. Stayed quarantined at home x 14 days. Is not sure if she wants to get her COVID vaccine. Feels like it has been rushed. DM- Since she likely had COVID in January, has had wide fluctuations in her blood sugars- having lows and highs (70's-200). Has not been great with her diet. Has not done a good job regulating her carbs. Lows seem to happen a few hours after breakfast.  Having more low sugars in the day then the evenings. Morning sugars are around 130's. Fredy Fuchs is her largest meal.     HTN- has not been checking her BP at home. States that she thinks there is a little bit of an anxiety component. Allergies   Allergen Reactions    Asa Arthritis Strength-Antacid [Aspirin Buff, Al Hyd-Mg Hyd] Nausea Only     Per patient stopped taking because it upset her stomach. She denies itching, swelling, rash.  Aspirin      Other reaction(s): GI Upset    Atorvastatin      Other reaction(s): Muscle Aches    Erythromycin Base Hives    Macrolides And Ketolides     Troleandomycin     Verapamil      Other reaction(s):  Other (See Comments)  Low heart rate     Sulfa Antibiotics Rash      Past Medical History:   Diagnosis Date    Allergic rhinitis     Fibromyalgia     GERD (gastroesophageal reflux disease)     Heart attack ([facility] [address]     Heart disease     Herniated disc     Hyperlipidemia     Hypertension     Type II or unspecified type diabetes mellitus without mention of complication, not stated as uncontrolled       Past Surgical History: Procedure Laterality Date    CORONARY ANGIOPLASTY WITH STENT PLACEMENT  2018    HYMENECTOMY        Social History     Socioeconomic History    Marital status:      Spouse name: Not on file    Number of children: Not on file    Years of education: Not on file    Highest education level: Not on file   Occupational History    Not on file   Social Needs    Financial resource strain: Not hard at all    Food insecurity     Worry: Never true     Inability: Never true   [employer] needs     Medical: No     Non-medical: No   Tobacco Use    Smoking status: Never Smoker    Smokeless tobacco: Never Used   Substance and Sexual Activity    Alcohol use: No    Drug use: No    Sexual activity: Not on file     Comment:    Lifestyle    Physical activity     Days per week: Not on file     Minutes per session: Not on file    Stress: Not on file   Relationships    Social connections     Talks on phone: Not on file     Gets together: Not on file     Attends Pent[employer]al service: Not on file     Active member of club or organization: Not on file     Attends meetings of clubs or organizations: Not on file     Relationship status: Not on file    Intimate partner violence     Fear of current or ex partner: Not on file     Emotionally abused: Not on file     Physically abused: Not on file     Forced sexual activity: Not on file   Other Topics Concern    Not on file   Social History Narrative    Not on file     Family History   Problem Relation Age of Onset    Heart Disease Mother     Cancer Father         ? lymphoma ( when patient was very young)    Diabetes Sister     Thyroid Disease Daughter         Outpatient Medications Prior to Visit   Medication Sig Dispense Refill    blood glucose monitor strips Test 3 times a day & as needed for symptoms of irregular blood glucose. Dispense sufficient amount for indicated testing frequency plus additional to accommodate PRN testing needs.     Which ever the insurance will cover. 100 strip 3    lisinopril (PRINIVIL;ZESTRIL) 40 MG tablet Take 1 tablet by mouth daily 90 tablet 3    dilTIAZem (TIAZAC) 360 MG extended release capsule Take 1 capsule by mouth daily TAKE 1 CAPSULE BY MOUTH ONE TIME A DAY 90 capsule 3    metFORMIN (GLUCOPHAGE-XR) 500 MG extended release tablet Take 2 tablets by mouth 2 times daily 360 tablet 3    pantoprazole (PROTONIX) 40 MG tablet Take 1 tablet by mouth every morning (before breakfast) 90 tablet 3    rosuvastatin (CRESTOR) 10 MG tablet Take 1 tablet by mouth nightly 90 tablet 3    Elastic Bandages & Supports (CARPAL TUNNEL WRIST DELUXE) MISC 1 each by Does not apply route nightly 1 each 0    aspirin EC 81 MG EC tablet Take 1 tablet by mouth daily 90 tablet 1    vitamin D3 (CHOLECALCIFEROL) 25 MCG (1000 UT) TABS tablet Take 1,000 Units by mouth daily      vitamin B-12 (CYANOCOBALAMIN) 1000 MCG tablet Take 1,000 mcg by mouth daily      Multiple Vitamins-Minerals (CENTRUM SILVER) TABS Take by mouth      nitroGLYCERIN (NITROSTAT) 0.4 MG SL tablet Place 1 tablet under the tongue every 5 minutes as needed for Chest pain 25 tablet 2    glipiZIDE (GLUCOTROL) 5 MG tablet Take 1 tablet by mouth 2 times daily (before meals) 180 tablet 3    blood glucose monitor strips Test daily and as needed for symptoms of irregular blood glucose. Dispense sufficient amount for indicated testing frequency plus additional to accommodate PRN testing needs. 100 strip 3     No facility-administered medications prior to visit. Patient'spast medical history, surgical history, family history, medications,  and allergies  were all reviewed and updated as appropriate today. Review of Systems   Constitutional: Positive for fatigue. Negative for appetite change and fever. Eyes:        Having high and low blood sugars   Respiratory: Negative for chest tightness and shortness of breath. Cardiovascular: Negative for chest pain.    Gastrointestinal: Negative for constipation and diarrhea. Skin: Negative for rash. BP (!) 170/54   Pulse 116   Wt 141 lb (64 kg)   SpO2 98%   BMI 24.20 kg/m²   Physical Exam  Vitals signs and nursing note reviewed. Constitutional:       Appearance: She is well-developed. She is not toxic-appearing. HENT:      Head: Normocephalic. Right Ear: Tympanic membrane, ear canal and external ear normal.      Left Ear: Tympanic membrane, ear canal and external ear normal.      Nose: Nose normal.      Mouth/Throat:      Mouth: Mucous membranes are moist.      Pharynx: No oropharyngeal exudate or posterior oropharyngeal erythema. Neck:      Thyroid: No thyroid mass or thyromegaly. Vascular: No carotid bruit. Cardiovascular:      Rate and Rhythm: Normal rate and regular rhythm. Pulses:           Dorsalis pedis pulses are 2+ on the right side and 2+ on the left side. Heart sounds: Normal heart sounds. No murmur. Comments: No LE edema  Pulmonary:      Effort: Pulmonary effort is normal.      Breath sounds: Normal breath sounds. Lymphadenopathy:      Cervical: No cervical adenopathy. Neurological:      Mental Status: She is alert. Psychiatric:         Mood and Affect: Affect normal. Mood is anxious. Behavior: Behavior is cooperative. ASSESSMENT/PLAN:    Problem List Items Addressed This Visit     Type 2 diabetes mellitus without complication, without long-term current use of insulin ([address] - Primary     Hemoglobin A1c was 6.4% on POCT in the office today. She is having low blood sugars in mid morning to afternoon. Will discontinue morning dose of glipizide. Continue Metformin ER 1000 mg twice daily and change glipizide to 5 mg with supper. Check CMP.          Relevant Medications    glipiZIDE (GLUCOTROL) 5 MG tablet    blood glucose monitor strips    Other Relevant Orders    POCT glycosylated hemoglobin (Hb A1C)    Comprehensive Metabolic Panel    Comprehensive Metabolic Panel Hemoglobin A1C    Lipid Panel    Microalbumin / Creatinine Urine Ratio    Hyperlipidemia LDL goal <70     Remains on Crestor 10 mg daily. Check lipid panel prior to return visit in June. Relevant Medications    hydrALAZINE (APRESOLINE) 25 MG tablet    Other Relevant Orders    TSH with Reflex    HTN (hypertension)     Above goal.  Start hydralazine 25 mg twice daily. Continue lisinopril 40 mg daily and Cardizem  mg daily. Coronary artery disease involving native coronary artery of native heart without angina pectoris     Asymptomatic. She is concerned about the possible effects of Covid on her heart. Encouraged her to consider getting COVID-19 vaccine even though she was most likely had Covid in January. Continue to follow with Dr. Alissa Velasquez. Continue aspirin, lisinopril 40 mg daily, Cardizem  mg daily, and Crestor 10 mg daily with as needed nitroglycerin. Relevant Medications    hydrALAZINE (APRESOLINE) 25 MG tablet    Vaccine counseling     Discussed importance of COVID-19 vaccination. Patient will consider. She likely had COVID-19 in January 2021 and would need to wear it 90 days until she gets her Covid vaccine. History of COVID-19     Based on symptoms of loss of smell, cough, fatigue, and severe headaches I suspect patient had COVID-19 in early January. She did not seek testing. Current Outpatient Medications   Medication Sig Dispense Refill    glipiZIDE (GLUCOTROL) 5 MG tablet Take 1 tablet by mouth Daily with supper 90 tablet 3    hydrALAZINE (APRESOLINE) 25 MG tablet Take 1 tablet by mouth 2 times daily 60 tablet 3    blood glucose monitor strips Test daily and as needed for symptoms of irregular blood glucose. Dispense sufficient amount for indicated testing frequency plus additional to accommodate PRN testing needs. 100 strip 3    blood glucose monitor strips Test 3 times a day & as needed for symptoms of irregular blood glucose.  Dispense sufficient amount for indicated testing frequency plus additional to accommodate PRN testing needs. Which ever the insurance will cover. 100 strip 3    lisinopril (PRINIVIL;ZESTRIL) 40 MG tablet Take 1 tablet by mouth daily 90 tablet 3    dilTIAZem (TIAZAC) 360 MG extended release capsule Take 1 capsule by mouth daily TAKE 1 CAPSULE BY MOUTH ONE TIME A DAY 90 capsule 3    metFORMIN (GLUCOPHAGE-XR) 500 MG extended release tablet Take 2 tablets by mouth 2 times daily 360 tablet 3    pantoprazole (PROTONIX) 40 MG tablet Take 1 tablet by mouth every morning (before breakfast) 90 tablet 3    rosuvastatin (CRESTOR) 10 MG tablet Take 1 tablet by mouth nightly 90 tablet 3    Elastic Bandages & Supports (CARPAL TUNNEL WRIST DELUXE) MISC 1 each by Does not apply route nightly 1 each 0    aspirin EC 81 MG EC tablet Take 1 tablet by mouth daily 90 tablet 1    vitamin D3 (CHOLECALCIFEROL) 25 MCG (1000 UT) TABS tablet Take 1,000 Units by mouth daily      vitamin B-12 (CYANOCOBALAMIN) 1000 MCG tablet Take 1,000 mcg by mouth daily      Multiple Vitamins-Minerals (CENTRUM SILVER) TABS Take by mouth      nitroGLYCERIN (NITROSTAT) 0.4 MG SL tablet Place 1 tablet under the tongue every 5 minutes as needed for Chest pain 25 tablet 2     No current facility-administered medications for this visit. Return in about 6 weeks (around 4/5/2021) for blood pressure; also 4 months with labs prior.

## 2021-02-22 NOTE — ASSESSMENT & PLAN NOTE
Discussed importance of COVID-19 vaccination. Patient will consider. She likely had COVID-19 in January 2021 and would need to wear it 90 days until she gets her Covid vaccine.

## 2021-02-22 NOTE — ASSESSMENT & PLAN NOTE
Asymptomatic. She is concerned about the possible effects of Covid on her heart. Encouraged her to consider getting COVID-19 vaccine even though she was most likely had Covid in January. Continue to follow with Dr. Barby Moon. Continue aspirin, lisinopril 40 mg daily, Cardizem  mg daily, and Crestor 10 mg daily with as needed nitroglycerin.

## 2021-02-22 NOTE — ASSESSMENT & PLAN NOTE
Hemoglobin A1c was 6.4% on POCT in the office today. She is having low blood sugars in mid morning to afternoon. Will discontinue morning dose of glipizide. Continue Metformin ER 1000 mg twice daily and change glipizide to 5 mg with supper. Check CMP.

## 2021-02-22 NOTE — ASSESSMENT & PLAN NOTE
Based on symptoms of loss of smell, cough, fatigue, and severe headaches I suspect patient had COVID-19 in early January. She did not seek testing.

## 2021-03-02 ENCOUNTER — VIRTUAL VISIT (OUTPATIENT)
Dept: INTERNAL MEDICINE CLINIC | Age: 71
End: 2021-03-02
Payer: MEDICARE

## 2021-03-02 DIAGNOSIS — Z00.00 ROUTINE GENERAL MEDICAL EXAMINATION AT A HEALTH CARE FACILITY: Primary | ICD-10-CM

## 2021-03-02 PROCEDURE — 3017F COLORECTAL CA SCREEN DOC REV: CPT | Performed by: INTERNAL MEDICINE

## 2021-03-02 PROCEDURE — G0438 PPPS, INITIAL VISIT: HCPCS | Performed by: INTERNAL MEDICINE

## 2021-03-02 PROCEDURE — 1123F ACP DISCUSS/DSCN MKR DOCD: CPT | Performed by: INTERNAL MEDICINE

## 2021-03-02 PROCEDURE — 4040F PNEUMOC VAC/ADMIN/RCVD: CPT | Performed by: INTERNAL MEDICINE

## 2021-03-02 ASSESSMENT — LIFESTYLE VARIABLES: HOW OFTEN DO YOU HAVE A DRINK CONTAINING ALCOHOL: 0

## 2021-03-02 ASSESSMENT — PATIENT HEALTH QUESTIONNAIRE - PHQ9
SUM OF ALL RESPONSES TO PHQ QUESTIONS 1-9: 0
2. FEELING DOWN, DEPRESSED OR HOPELESS: 0
SUM OF ALL RESPONSES TO PHQ QUESTIONS 1-9: 0

## 2021-03-02 NOTE — PROGRESS NOTES
Medicare Annual Wellness Visit  Name: Edil Hunt Date: 3/14/2021   MRN: [id number] Sex: Female   Age: 69 y.o. Ethnicity: Non-/Non    : 1950 Race: Darrick Cea is here for Medicare AWV    Screenings for behavioral, psychosocial and functional/safety risks, and cognitive dysfunction are all negative except as indicated below. These results, as well as other patient data from the [address] form, are documented in Flowsheets linked to this Encounter. Allergies   Allergen Reactions    Asa Arthritis Strength-Antacid [Aspirin Buff, Al Hyd-Mg Hyd] Nausea Only     Per patient stopped taking because it upset her stomach. She denies itching, swelling, rash.  Aspirin      Other reaction(s): GI Upset    Atorvastatin      Other reaction(s): Muscle Aches    Erythromycin Base Hives    Macrolides And Ketolides     Troleandomycin     Verapamil      Other reaction(s): Other (See Comments)  Low heart rate     Sulfa Antibiotics Rash         Prior to Visit Medications    Medication Sig Taking? Authorizing Provider   lisinopril (PRINIVIL;ZESTRIL) 20 MG tablet Take 1 tablet by mouth 2 times daily  López Barrow MD   dilTIAZem [facility]) 240 MG extended release capsule Take 1 capsule by mouth 2 times daily  López Barrow MD   glipiZIDE (GLUCOTROL) 5 MG tablet Take 1 tablet by mouth Daily with supper  Siri Pereira,    blood glucose monitor strips Test daily and as needed for symptoms of irregular blood glucose. Dispense sufficient amount for indicated testing frequency plus additional to accommodate PRN testing needs. Siri Pereira DO   blood glucose monitor strips Test 3 times a day & as needed for symptoms of irregular blood glucose. Dispense sufficient amount for indicated testing frequency plus additional to accommodate PRN testing needs. Which ever the insurance will cover.   Siri Pereira DO   metFORMIN (GLUCOPHAGE-XR) 500 MG extended release tablet Take 2 tablets by mouth 2 times daily  Tarry Budge, DO   pantoprazole (PROTONIX) 40 MG tablet Take 1 tablet by mouth every morning (before breakfast)  Tarry Budge, DO   rosuvastatin (CRESTOR) 10 MG tablet Take 1 tablet by mouth nightly  Tarry Budge, DO   Elastic Bandages & Supports (CARPAL TUNNEL WRIST DELUXE) MISC 1 each by Does not apply route nightly  Tarry Budge, DO   aspirin EC 81 MG EC tablet Take 1 tablet by mouth daily  Sandro Perry MD   vitamin D3 (CHOLECALCIFEROL) 25 MCG (1000 UT) TABS tablet Take 1,000 Units by mouth daily  Historical Provider, MD   vitamin B-12 (CYANOCOBALAMIN) 1000 MCG tablet Take 1,000 mcg by mouth daily  Historical Provider, MD   Multiple Vitamins-Minerals (CENTRUM SILVER) TABS Take by mouth  Historical Provider, MD   nitroGLYCERIN (NITROSTAT) 0.4 MG SL tablet Place 1 tablet under the tongue every 5 minutes as needed for Chest pain  Sandro Perry MD         Past Medical History:   Diagnosis Date    Allergic rhinitis     Fibromyalgia     GERD (gastroesophageal reflux disease)     Heart attack ([facility] [address]     Heart disease     Herniated disc     Hyperlipidemia     Hypertension     Type II or unspecified type diabetes mellitus without mention of complication, not stated as uncontrolled        Past Surgical History:   Procedure Laterality Date    CORONARY ANGIOPLASTY WITH STENT PLACEMENT  2018    HYMENECTOMY           Family History   Problem Relation Age of Onset    Heart Disease Mother     Cancer Father         ? lymphoma ( when patient was very young)    Diabetes Sister     Thyroid Disease Daughter        CareTeam (Including outside providers/suppliers regularly involved in providing care):   Patient Care Team:  Rashaad Patel DO as PCP - General (Internal Medicine)  Rashaad Patel DO as PCP - REHABILITATION [facility] Empaneled Provider    Wt Readings from Last 3 Encounters:   03/10/21 140 lb 6.4 oz (63.7 kg)   21 141 lb (64 kg) 11/09/20 138 lb 9.6 oz (62.9 kg)     There were no vitals filed for this visit. There is no height or weight on file to calculate BMI. Based upon direct observation of the patient, evaluation of cognition reveals recent and remote memory intact. Patient's complete Health Risk Assessment and screening values have been reviewed and are found in Flowsheets. The following problems were reviewed today and where indicated follow up appointments were made and/or referrals ordered. Positive Risk Factor Screenings with Interventions:            General Health and ACP:  General  In general, how would you say your health is?: Good  In the past 7 days, have you experienced any of the following? New or Increased Pain, New or Increased Fatigue, Loneliness, Social Isolation, Stress or Anger?: None of These  Do you get the social and emotional support that you need?: Yes  Do you have a Living Will?: (!) No  Advance Directives     Power of [address] [facility] [address] Will ACP-Advance Directive ACP-Power of     Not on File Not on File Not on File Not on File      General Health Risk Interventions:  · No Living Will: Patient declines ACP discussion/assistance        Personalized Preventive Plan   Current Health Maintenance Status    There is no immunization history on file for this patient.      Health Maintenance   Topic Date Due    Hepatitis C screen  Never done    COVID-19 Vaccine (1) Never done    DTaP/Tdap/Td vaccine (1 - Tdap) Never done    Shingles Vaccine (1 of 2) Never done    Diabetic microalbuminuria test  09/27/2012    Pneumococcal 65+ years Vaccine (1 of 1 - PPSV23) Never done    Breast cancer screen  12/02/2018    Colon cancer screen colonoscopy  06/09/2019    Annual Wellness Visit (AWV)  Never done    Flu vaccine (1) Never done    Lipid screen  08/04/2021    Diabetic foot exam  08/07/2021    Diabetic retinal exam  09/28/2021    A1C test (Diabetic or Prediabetic)  02/22/2022    Potassium monitoring 02/22/2022    Creatinine monitoring  02/22/2022    DEXA (modify frequency per FRAX score)  Completed    Hepatitis A vaccine  Aged Out    Hib vaccine  Aged Out    Meningococcal (ACWY) vaccine  Aged Out     Overdue for flu, shingles and pneumococcal vaccine. Discussed with patient, declines at this time. May consider covid vaccine in future - currently recovering from covid virus. Patient will schedule mammogram and colonoscopy once pandemic is over. Recommendations for Preventive Services Due: see orders and patient instructions/AVS.  . Recommended screening schedule for the next 5-10 years is provided to the patient in written form: see Patient Instructions/AVS.    Rhiannon COELLO RN, 3/2/2021, performed the documented evaluation under the direct supervision of the attending physician. Chloé Shirley, was evaluated through a synchronous (real-time) audio-video encounter. The patient (or guardian if applicable) is aware that this is a billable service. Verbal consent to proceed has been obtained within the past 12 months. The visit was conducted pursuant to the emergency declaration under the [address] [place] [address], [address] [place] [address] authority and the [employer] General Act. Patient identification was verified, and a caregiver was present when appropriate. The patient was located in a state where the provider was credentialed to provide care. Total time spent for this encounter: 20 minutes    --HAMLET PYLE DO on 3/14/2021 at 4:48 PM    An electronic signature was used to authenticate this note. This encounter was performed under Monty soto DOs, direct supervision, 3/2/2021.

## 2021-03-09 ENCOUNTER — TELEPHONE (OUTPATIENT)
Dept: CARDIOLOGY CLINIC | Age: 71
End: 2021-03-09

## 2021-03-09 NOTE — PROGRESS NOTES
[address]   Cardiac f/up    Referring Provider:  Megan Roman DO     Chief Complaint   Patient presents with    Follow-up    Palpitations    CAD and chest pain  History of Present Illness:  Mrs. Marisol Henley is s/p acute posterior wall MI April 2018 (progressive exertional chest discomfort). LHC showed 100% OM1 which was stented. She is on Crestor for marked hyperlipidemia, has family history of premature heart disease. Today, she reports she has been having issues with her heart rate and BP being elevated. She has had some upper back pain and shoulders. She denies shortness of breath, palpitations, dizziness, and edema. She is unable to take beta blockers. She believes she had Covid in January. Past Medical History:   has a past medical history of Allergic rhinitis, Fibromyalgia, GERD (gastroesophageal reflux disease), Heart attack ([address] Heart disease, Herniated disc, Hyperlipidemia, Hypertension, and Type II or unspecified type diabetes mellitus without mention of complication, not stated as uncontrolled. Surgical History:   has a past surgical history that includes Coronary angioplasty with stent (2018) and Hymenectomy. Social History:   reports that she has never smoked. She has never used smokeless tobacco. She reports that she does not drink alcohol or use drugs. Family History:  family history includes Cancer in her father; Diabetes in her sister; Heart Disease in her mother; Thyroid Disease in her daughter. Home Medications:  Prior to Admission medications    Medication Sig Start Date End Date Taking? Authorizing Provider   glipiZIDE (GLUCOTROL) 5 MG tablet Take 1 tablet by mouth Daily with supper 2/22/21  Yes Alycia Morales DO   blood glucose monitor strips Test daily and as needed for symptoms of irregular blood glucose. Dispense sufficient amount for indicated testing frequency plus additional to accommodate PRN testing needs.  2/22/21  Yes Alycia Morales DO   blood glucose monitor strips Test 3 times a day & as needed for symptoms of irregular blood glucose. Dispense sufficient amount for indicated testing frequency plus additional to accommodate PRN testing needs. Which ever the insurance will cover. 2/12/21  Yes Lu House DO   lisinopril (PRINIVIL;ZESTRIL) 40 MG tablet Take 1 tablet by mouth daily 2/4/21  Yes Lu House DO   dilTIAZem PEREIR[facility]) 360 MG extended release capsule Take 1 capsule by mouth daily TAKE 1 CAPSULE BY MOUTH ONE TIME A DAY 2/4/21  Yes Lu House DO   metFORMIN (GLUCOPHAGE-XR) 500 MG extended release tablet Take 2 tablets by mouth 2 times daily 2/4/21  Yes Lu House DO   pantoprazole (PROTONIX) 40 MG tablet Take 1 tablet by mouth every morning (before breakfast) 2/4/21  Yes Lu House DO   rosuvastatin (CRESTOR) 10 MG tablet Take 1 tablet by mouth nightly 2/4/21  Yes Lu House DO   Elastic Bandages & Supports (CARPAL TUNNEL WRIST DELUXE) [facility] 1 each by Does not apply route nightly 11/9/20  Yes Lu House DO   aspirin EC 81 MG EC tablet Take 1 tablet by mouth daily 9/11/20  Yes Kasey Calderon MD   vitamin D3 (CHOLECALCIFEROL) 25 MCG (1000 UT) TABS tablet Take 1,000 Units by mouth daily   Yes Historical Provider, MD   vitamin B-12 (CYANOCOBALAMIN) 1000 MCG tablet Take 1,000 mcg by mouth daily 1/15/20  Yes Historical Provider, MD   Multiple Vitamins-Minerals (CENTRUM SILVER) TABS Take by mouth   Yes Historical Provider, MD   nitroGLYCERIN (NITROSTAT) 0.4 MG SL tablet Place 1 tablet under the tongue every 5 minutes as needed for Chest pain 8/16/19  Yes Kasey Calderon MD   hydrALAZINE (APRESOLINE) 25 MG tablet Take 1 tablet by mouth 2 times daily  Patient not taking: Reported on 3/10/2021 2/22/21   Lu House DO      Allergies:  Pam Barbour arthritis strength-antacid [aspirin buff, al hyd-mg hyd]; Aspirin;  Atorvastatin; Erythromycin base; Macrolides and ketolides; Troleandomycin; extremities. · No edema  · Femoral Arteries: 2+ and equal  · Pedal Pulses: 2+ and equal   Abdomen:  · No masses or tenderness  · Liver/Spleen: No Abnormalities Noted  Neurological/Psychiatric:  · Alert and oriented in all spheres  · Moves all extremities well  · Exhibits normal gait balance and coordination  · No abnormalities of mood, affect, memory, mentation, or behavior are noted    Angiogram 4/20/18  Acute inferior posterior wall MI  Cath with 40-50% mid RCA,100% OM1,EF 50% with mild inferior wall hypokinesis. Trop 2.99.     PCI with 2.5 x 15 balloon then 2.5 x 18 manasa SHARIF stent to 16 hitesh with excellent angiographic result. Small distal branch with embolization. Will continue aaggrastat x 12 hours. Plavix given. She has been on crestor for lipid management,need to give in house  ECG-8/16/19 for chest pain  with borderline short [place] interval otherwise normal-done for chest pain    Nuclear stress 3/4/19      Summary     Small sized lateral fixed defect of mild intensity consistent with     infarction in the territory of the mid and distal LCx .     Normal LV size and systolic function.               Assessment:     CAD (posterior wall MI April 2018)  [facility] 4/20/18> SHARIF to [email or web address]  Family history of premature heart disease. Stable, no anginal symptoms. Had GI symptoms (upper abd pain) that radiated to neck and back with prior stenting  Switch 81 mg ASA to enteric coated    Hypertension  Elevated   Blood pressure (!) 152/88, pulse 96, temperature 98.6 °F (37 °C), height 5' 4\" (1.626 m), weight 140 lb 6.4 oz (63.7 kg), SpO2 97 %. Hyperlipidemia  Takes Crestor 10mg  8/4/20> , , HDL 47, LDL 66, ALT 18 AST 18     Carotid dopplers 6/14/18 (Care Everywhere)> less than 50% mary  Abd u/s 6/3/17> Negative for aneurysm    Palpitations -risk for accessory pathway conduction  MCOT 9/24/19> SR/PAT - controlled         Plan:  Polly Remy has a stable cardiac status.  Cardiac test and lab results personally reviewed by me during this office visit and discussed. Change lisinopril (PRINIVIL;ZESTRIL)to 20 MG tablet 2x daily   Change dilTIAZem (TIAZAC) to 240 MG extended release capsule 2x daily   Covid antibody testing   Continue other medications  Follow up as scheduled. I appreciate the opportunity of cooperating in the care of this individual.    Aylin Arellano. Nazia Escobedo M.D., [facility]      Patient's problem list, medications, allergies, past medical, surgical, social and family histories were reviewed and updated as appropriate. Scribe's attestation: This note was scribed in the presence of Dr. Nazia Escobedo MD, by Dima Solitario RN. The scribe's documentation has been prepared under my direction and personally reviewed by me in its entirety. I confirm that the note above accurately reflects all work, treatment, procedures, and medical decision making performed by me.

## 2021-03-09 NOTE — TELEPHONE ENCOUNTER
Pt calling her heart has been racing all night and she wants to come in today to be seen, What should she do?  Pls call to advise Thank you

## 2021-03-09 NOTE — TELEPHONE ENCOUNTER
Last OV 9/11/20 LES  Spoke to the pt-the heart rate is back down now, but did wake her up last night. I asked her to take her blood pressure while on the phone with me-results were 197/98, HR 85. Having some pain in her mid-back,  radiating up into her shoulders, and back of the neck, on and off, lasting fifteen minutes. Pain level has been as high as nine.

## 2021-03-10 ENCOUNTER — OFFICE VISIT (OUTPATIENT)
Dept: CARDIOLOGY CLINIC | Age: 71
End: 2021-03-10
Payer: MEDICARE

## 2021-03-10 VITALS
OXYGEN SATURATION: 97 % | HEIGHT: 64 IN | DIASTOLIC BLOOD PRESSURE: 88 MMHG | HEART RATE: 96 BPM | BODY MASS INDEX: 23.97 KG/M2 | WEIGHT: 140.4 LBS | TEMPERATURE: 98.6 F | SYSTOLIC BLOOD PRESSURE: 152 MMHG

## 2021-03-10 DIAGNOSIS — R00.2 PALPITATIONS: Primary | ICD-10-CM

## 2021-03-10 DIAGNOSIS — I10 ESSENTIAL HYPERTENSION: ICD-10-CM

## 2021-03-10 DIAGNOSIS — I25.10 CORONARY ARTERY DISEASE INVOLVING NATIVE CORONARY ARTERY OF NATIVE HEART WITHOUT ANGINA PECTORIS: ICD-10-CM

## 2021-03-10 DIAGNOSIS — R00.2 HEART PALPITATIONS: ICD-10-CM

## 2021-03-10 DIAGNOSIS — E78.5 HYPERLIPIDEMIA LDL GOAL <70: ICD-10-CM

## 2021-03-10 DIAGNOSIS — U07.1 COVID-19: ICD-10-CM

## 2021-03-10 PROCEDURE — 93000 ELECTROCARDIOGRAM COMPLETE: CPT | Performed by: INTERNAL MEDICINE

## 2021-03-10 PROCEDURE — G8427 DOCREV CUR MEDS BY ELIG CLIN: HCPCS | Performed by: INTERNAL MEDICINE

## 2021-03-10 PROCEDURE — 4040F PNEUMOC VAC/ADMIN/RCVD: CPT | Performed by: INTERNAL MEDICINE

## 2021-03-10 PROCEDURE — G8420 CALC BMI NORM PARAMETERS: HCPCS | Performed by: INTERNAL MEDICINE

## 2021-03-10 PROCEDURE — 99214 OFFICE O/P EST MOD 30 MIN: CPT | Performed by: INTERNAL MEDICINE

## 2021-03-10 PROCEDURE — G8484 FLU IMMUNIZE NO ADMIN: HCPCS | Performed by: INTERNAL MEDICINE

## 2021-03-10 PROCEDURE — 1123F ACP DISCUSS/DSCN MKR DOCD: CPT | Performed by: INTERNAL MEDICINE

## 2021-03-10 PROCEDURE — 3017F COLORECTAL CA SCREEN DOC REV: CPT | Performed by: INTERNAL MEDICINE

## 2021-03-10 PROCEDURE — 1090F PRES/ABSN URINE INCON ASSESS: CPT | Performed by: INTERNAL MEDICINE

## 2021-03-10 PROCEDURE — G8399 PT W/DXA RESULTS DOCUMENT: HCPCS | Performed by: INTERNAL MEDICINE

## 2021-03-10 PROCEDURE — 1036F TOBACCO NON-USER: CPT | Performed by: INTERNAL MEDICINE

## 2021-03-10 RX ORDER — DILTIAZEM HYDROCHLORIDE 240 MG/1
240 CAPSULE, EXTENDED RELEASE ORAL 2 TIMES DAILY
Qty: 180 CAPSULE | Refills: 3 | Status: SHIPPED | OUTPATIENT
Start: 2021-03-10 | End: 2021-10-20 | Stop reason: SDUPTHER

## 2021-03-10 RX ORDER — LISINOPRIL 20 MG/1
20 TABLET ORAL 2 TIMES DAILY
Qty: 180 TABLET | Refills: 3 | Status: SHIPPED | OUTPATIENT
Start: 2021-03-10 | End: 2021-10-20 | Stop reason: SDUPTHER

## 2021-03-29 NOTE — PROGRESS NOTES
[facility]   Cardiac f/up    Referring Provider:  Tang Georges DO     No chief complaint on file. History of Present Illness:  Mrs. Jalyn Castellano is s/p acute posterior wall MI April 2018 (progressive exertional chest discomfort). LHC showed 100% OM1 which was stented. She is on Crestor for marked hyperlipidemia, has family history of premature heart disease. Intolerant to beta blockers. She feels she had COVID 1/2021. At our last visit, she reported elevated heart rates and blood pressures at home. Today, ***    Past Medical History:   has a past medical history of Allergic rhinitis, Fibromyalgia, GERD (gastroesophageal reflux disease), Heart attack ([address] Heart disease, Herniated disc, Hyperlipidemia, Hypertension, and Type II or unspecified type diabetes mellitus without mention of complication, not stated as uncontrolled. Surgical History:   has a past surgical history that includes Coronary angioplasty with stent (2018) and Hymenectomy. Social History:   reports that she has never smoked. She has never used smokeless tobacco. She reports that she does not drink alcohol or use drugs. Family History:  family history includes Cancer in her father; Diabetes in her sister; Heart Disease in her mother; Thyroid Disease in her daughter. Home Medications:  Prior to Admission medications    Medication Sig Start Date End Date Taking? Authorizing Provider   lisinopril (PRINIVIL;ZESTRIL) 20 MG tablet Take 1 tablet by mouth 2 times daily 3/10/21   Roosevelt Ha MD   dilTIAZem PEREIRA [facility]) 240 MG extended release capsule Take 1 capsule by mouth 2 times daily 3/10/21   Roosevelt Ha MD   glipiZIDE (GLUCOTROL) 5 MG tablet Take 1 tablet by mouth Daily with supper 2/22/21   Korina Bull,    blood glucose monitor strips Test daily and as needed for symptoms of irregular blood glucose.  Dispense sufficient amount for indicated testing frequency plus additional to accommodate PRN testing production. No hematemesis. · Gastrointestinal: No abdominal pain, appetite loss, blood in stools. No change in bowel or bladder habits. · Genitourinary: No dysuria, trouble voiding, or hematuria. · Musculoskeletal:  No gait disturbance, weakness or joint complaints. +Back pain  · Integumentary: No rash or pruritis. · Neurological: No headache, diplopia, change in muscle strength, numbness or tingling. No change in gait, balance, coordination, mood, affect, memory, mentation, behavior. · Psychiatric: No anxiety, no depression. · Endocrine: No malaise, fatigue or temperature intolerance. No excessive thirst, fluid intake, or urination. No tremor. · Hematologic/Lymphatic: No abnormal bruising or bleeding, blood clots or swollen lymph nodes. · Allergic/Immunologic: No nasal congestion or hives. Physical Examination:    There were no vitals filed for this visit. Constitutional and General Appearance: Appears stated age, NAD   Skin:good turgor,intact without lesions  HEENT: EOMI ,normal  Neck:no JVD    Respiratory:  · Normal excursion and expansion without use of accessory muscles  · Resp Auscultation: Normal breath sounds without dullness  Cardiovascular:  · The apical impulses not displaced  · Heart tones are crisp and normal  · Cervical veins are not engorged  · The carotid upstroke is normal in amplitude and contour without delay or bruit  · Peripheral pulses are symmetrical and full  · There is no clubbing, cyanosis of the extremities.   · No edema  · Femoral Arteries: 2+ and equal  · Pedal Pulses: 2+ and equal   Abdomen:  · No masses or tenderness  · Liver/Spleen: No Abnormalities Noted  Neurological/Psychiatric:  · Alert and oriented in all spheres  · Moves all extremities well  · Exhibits normal gait balance and coordination  · No abnormalities of mood, affect, memory, mentation, or behavior are noted    Angiogram 4/20/18  Acute inferior posterior wall MI  Cath with 40-50% mid RCA,100% OM1,EF 50% with mild inferior wall hypokinesis. Trop 2.99.     PCI with 2.5 x 15 balloon then 2.5 x 18 manasa SHARIF stent to 16 hitesh with excellent angiographic result. Small distal branch with embolization. Will continue aaggrastat x 12 hours. Plavix given. She has been on crestor for lipid management,need to give in house  ECG-8/16/19 for chest pain  with borderline short [place] interval otherwise normal-done for chest pain    Nuclear stress 3/4/19  Summary    Small sized lateral fixed defect of mild intensity consistent with infarction in the territory of the mid and distal LCx . Normal LV size and systolic function. Assessment:     CAD (posterior wall MI April 2018)  [facility] 4/20/18> SHARIF to OM1  Family history of premature heart disease. Stable, no anginal symptoms- Had GI symptoms (upper abd pain) that radiated to neck and back with prior stenting  ASA/statin     Hypertension  There were no vitals taken for this visit. Controlled on Lisinopril 20 mg BID    Hyperlipidemia  Takes Crestor 10mg  8/4/20> , , HDL 47, LDL 66, ALT 18 AST 18     Carotid dopplers 6/14/18 (Care Everywhere)> less than 50% mary  Abd u/s 6/3/17> Negative for aneurysm    Palpitations -risk for accessory pathway conduction  MCOT 9/24/19> SR/PAT - controlled   Intolerant to beta blockers  On Diltiazem 240 mg BID    Plan:  Karen Mcnair has a stable cardiac status. Cardiac test and lab results personally reviewed by me during this office visit and discussed. I appreciate the opportunity of cooperating in the care of this individual.    Omar Melendez. Isaac Perez M.D., [facility]      Patient's problem list, medications, allergies, past medical, surgical, social and family histories were reviewed and updated as appropriate. Scribe's Attestation: This note was scribed in the presence of Dr. Isaac Perez MD by Lucy Richards RN.

## 2021-03-30 NOTE — PROGRESS NOTES
[address]   Cardiac f/up    Referring Provider:  Debbie Levy DO     Chief Complaint   Patient presents with    6 Month Follow-Up     increase in lisinopril and diltiazem helping with palpipations    Coronary Artery Disease    Other     C/O \"dizzy spells\"  Has an appointment 04/07/2021 with PCP incase its her glucose, sugar levels have been up and down     CAD and chest pain  History of Present Illness:  Mrs. Elaine Paredes is s/p acute posterior wall MI April 2018 (progressive exertional chest discomfort). LHC showed 100% OM1 which was stented. She is on Crestor for marked hyperlipidemia, has family history of premature heart disease. Intolerant to beta blockers. She feels she had COVID 1/2021. At our last visit, she reported elevated heart rates and blood pressures at home.     Today, she reports her BP has been well controlled since her last visit. She has been feeling much better with the medication change. Denies exertional chest pain, NULL/PND, palpitations, light-headedness, edema. Past Medical History:   has a past medical history of Allergic rhinitis, Fibromyalgia, GERD (gastroesophageal reflux disease), Heart attack ([address] Heart disease, Herniated disc, Hyperlipidemia, Hypertension, and Type II or unspecified type diabetes mellitus without mention of complication, not stated as uncontrolled. Surgical History:   has a past surgical history that includes Coronary angioplasty with stent (2018) and Hymenectomy. Social History:   reports that she has never smoked. She has never used smokeless tobacco. She reports that she does not drink alcohol or use drugs. Family History:  family history includes Cancer in her father; Diabetes in her sister; Heart Disease in her mother; Thyroid Disease in her daughter. Home Medications:  Prior to Admission medications    Medication Sig Start Date End Date Taking?  Authorizing Provider   lisinopril (PRINIVIL;ZESTRIL) 20 MG tablet Take 1 tablet by mouth 2 times daily 3/10/21   Eric Spann MD   dilTIAZem PEREIRA [facility]) 240 MG extended release capsule Take 1 capsule by mouth 2 times daily 3/10/21   Eric Spann MD   glipiZIDE (GLUCOTROL) 5 MG tablet Take 1 tablet by mouth Daily with supper 2/22/21   Kylah Ye DO   blood glucose monitor strips Test daily and as needed for symptoms of irregular blood glucose. Dispense sufficient amount for indicated testing frequency plus additional to accommodate PRN testing needs. 2/22/21   Kylah Ye DO   blood glucose monitor strips Test 3 times a day & as needed for symptoms of irregular blood glucose. Dispense sufficient amount for indicated testing frequency plus additional to accommodate PRN testing needs. Which ever the insurance will cover. 2/12/21   Kylah Ye DO   metFORMIN (GLUCOPHAGE-XR) 500 MG extended release tablet Take 2 tablets by mouth 2 times daily 2/4/21   Kylah Ye DO   pantoprazole (PROTONIX) 40 MG tablet Take 1 tablet by mouth every morning (before breakfast) 2/4/21   Kylah Ye DO   rosuvastatin (CRESTOR) 10 MG tablet Take 1 tablet by mouth nightly 2/4/21   Kylah Ye DO   Elastic Bandages & Supports (CARPAL TUNNEL WRIST DELUXE) MISC 1 each by Does not apply route nightly 11/9/20   Kylah Ye DO   aspirin EC 81 MG EC tablet Take 1 tablet by mouth daily 9/11/20   Eric Spann MD   vitamin D3 (CHOLECALCIFEROL) 25 MCG (1000 UT) TABS tablet Take 1,000 Units by mouth daily    Historical Provider, MD   vitamin B-12 (CYANOCOBALAMIN) 1000 MCG tablet Take 1,000 mcg by mouth daily 1/15/20   Historical Provider, MD   Multiple Vitamins-Minerals (CENTRUM SILVER) TABS Take by mouth    Historical Provider, MD   nitroGLYCERIN (NITROSTAT) 0.4 MG SL tablet Place 1 tablet under the tongue every 5 minutes as needed for Chest pain 8/16/19   Eric Spann MD      Allergies:  Maylon Luciana arthritis strength-antacid [aspirin buff, al hyd-mg hyd];  Aspirin; Atorvastatin; Erythromycin base; Macrolides and ketolides; Troleandomycin; Verapamil; and Sulfa antibiotics     Review of Systems:   · Constitutional: there has been no unanticipated weight loss. There's been no change in energy level, sleep pattern, or activity level. · Eyes: No visual changes or diplopia. No scleral icterus. · ENT: No Headaches, hearing loss or vertigo. No mouth sores or sore throat. · Cardiovascular: Reviewed in HPI  · Respiratory: No cough or wheezing, no sputum production. No hematemesis. · Gastrointestinal: No abdominal pain, appetite loss, blood in stools. No change in bowel or bladder habits. · Genitourinary: No dysuria, trouble voiding, or hematuria. · Musculoskeletal:  No gait disturbance, weakness or joint complaints. +Back pain  · Integumentary: No rash or pruritis. · Neurological: No headache, diplopia, change in muscle strength, numbness or tingling. No change in gait, balance, coordination, mood, affect, memory, mentation, behavior. · Psychiatric: No anxiety, no depression. · Endocrine: No malaise, fatigue or temperature intolerance. No excessive thirst, fluid intake, or urination. No tremor. · Hematologic/Lymphatic: No abnormal bruising or bleeding, blood clots or swollen lymph nodes. · Allergic/Immunologic: No nasal congestion or hives.     Physical Examination:    Vitals:    03/31/21 1314   BP: 134/68   Pulse: 84   Temp: 97.3 °F (36.3 °C)   SpO2: 98%        Constitutional and General Appearance: Appears stated age, NAD   Skin:good turgor,intact without lesions  HEENT: EOMI ,normal  Neck:no JVD    Respiratory:  · Normal excursion and expansion without use of accessory muscles  · Resp Auscultation: Normal breath sounds without dullness  Cardiovascular:  · The apical impulses not displaced  · Heart tones are crisp and normal  · Cervical veins are not engorged  · The carotid upstroke is normal in amplitude and contour without delay or bruit  · Peripheral pulses are symmetrical and full  · There is no clubbing, cyanosis of the extremities. · No edema  · Femoral Arteries: 2+ and equal  · Pedal Pulses: 2+ and equal   Abdomen:  · No masses or tenderness  · Liver/Spleen: No Abnormalities Noted  Neurological/Psychiatric:  · Alert and oriented in all spheres  · Moves all extremities well  · Exhibits normal gait balance and coordination  · No abnormalities of mood, affect, memory, mentation, or behavior are noted    Angiogram 4/20/18  Acute inferior posterior wall MI  Cath with 40-50% mid RCA,100% OM1,EF 50% with mild inferior wall hypokinesis. Trop 2.99.     PCI with 2.5 x 15 balloon then 2.5 x 18 manasa SHARIF stent to 16 hitesh with excellent angiographic result. Small distal branch with embolization. Will continue aaggrastat x 12 hours. Plavix given. She has been on crestor for lipid management,need to give in house  ECG-8/16/19 for chest pain  with borderline short [place] interval otherwise normal-done for chest pain    Nuclear stress 3/4/19      Summary     Small sized lateral fixed defect of mild intensity consistent with     infarction in the territory of the mid and distal LCx .     Normal LV size and systolic function.               Assessment:     CAD (posterior wall MI April 2018)  [facility] 4/20/18> SHARIF to [facility]  Family history of premature heart disease. Stable, no anginal symptoms. Had GI symptoms (upper abd pain) that radiated to neck and back with prior stenting  ASA     Hypertension  Controlled   Blood pressure 134/68, pulse 84, temperature 97.3 °F (36.3 °C), temperature source Temporal, height 5' 4\" (1.626 m), weight 142 lb (64.4 kg), SpO2 98 %.     Hyperlipidemia  Takes Crestor 10mg  8/4/20> , , HDL 47, LDL 66, ALT 18 AST 18     Carotid dopplers 6/14/18 (Care Everywhere)> less than 50% mary  Abd u/s 6/3/17> Negative for aneurysm    Palpitations -risk for accessory pathway conduction  [facility]OT 9/24/19> SR/PAT - controlled   Intolerant to beta blockers  On Diltiazem 240 mg BID      Plan:  Lori Taylor has a stable cardiac status. Cardiac test and lab results personally reviewed by me during this office visit and discussed. N medication changes   Continue other medications  Follow up in 6 months with Lipids prior . I appreciate the opportunity of cooperating in the care of this individual.    Angela Soto. Solomon Sheehan M.D., [facility]      Patient's problem list, medications, allergies, past medical, surgical, social and family histories were reviewed and updated as appropriate. Scribe's attestation: This note was scribed in the presence of Dr. Solomon Sheeahn MD, by Altagracia Coon RN. The scribe's documentation has been prepared under my direction and personally reviewed by me in its entirety. I confirm that the note above accurately reflects all work, treatment, procedures, and medical decision making performed by me.

## 2021-03-31 ENCOUNTER — OFFICE VISIT (OUTPATIENT)
Dept: CARDIOLOGY CLINIC | Age: 71
End: 2021-03-31
Payer: MEDICARE

## 2021-03-31 VITALS
BODY MASS INDEX: 24.24 KG/M2 | SYSTOLIC BLOOD PRESSURE: 134 MMHG | OXYGEN SATURATION: 98 % | HEIGHT: 64 IN | HEART RATE: 84 BPM | DIASTOLIC BLOOD PRESSURE: 68 MMHG | WEIGHT: 142 LBS | TEMPERATURE: 97.3 F

## 2021-03-31 DIAGNOSIS — E78.5 HYPERLIPIDEMIA LDL GOAL <70: ICD-10-CM

## 2021-03-31 DIAGNOSIS — I10 ESSENTIAL HYPERTENSION: ICD-10-CM

## 2021-03-31 DIAGNOSIS — I25.10 CORONARY ARTERY DISEASE INVOLVING NATIVE CORONARY ARTERY OF NATIVE HEART WITHOUT ANGINA PECTORIS: Primary | ICD-10-CM

## 2021-03-31 PROCEDURE — G8420 CALC BMI NORM PARAMETERS: HCPCS | Performed by: INTERNAL MEDICINE

## 2021-03-31 PROCEDURE — 3017F COLORECTAL CA SCREEN DOC REV: CPT | Performed by: INTERNAL MEDICINE

## 2021-03-31 PROCEDURE — 4040F PNEUMOC VAC/ADMIN/RCVD: CPT | Performed by: INTERNAL MEDICINE

## 2021-03-31 PROCEDURE — G8484 FLU IMMUNIZE NO ADMIN: HCPCS | Performed by: INTERNAL MEDICINE

## 2021-03-31 PROCEDURE — G8399 PT W/DXA RESULTS DOCUMENT: HCPCS | Performed by: INTERNAL MEDICINE

## 2021-03-31 PROCEDURE — 1036F TOBACCO NON-USER: CPT | Performed by: INTERNAL MEDICINE

## 2021-03-31 PROCEDURE — 1090F PRES/ABSN URINE INCON ASSESS: CPT | Performed by: INTERNAL MEDICINE

## 2021-03-31 PROCEDURE — G8427 DOCREV CUR MEDS BY ELIG CLIN: HCPCS | Performed by: INTERNAL MEDICINE

## 2021-03-31 PROCEDURE — 99214 OFFICE O/P EST MOD 30 MIN: CPT | Performed by: INTERNAL MEDICINE

## 2021-03-31 PROCEDURE — 1123F ACP DISCUSS/DSCN MKR DOCD: CPT | Performed by: INTERNAL MEDICINE

## 2021-04-16 ENCOUNTER — HOSPITAL ENCOUNTER (EMERGENCY)
Age: 71
Discharge: LEFT BEFORE DISCHARGE | End: 2021-04-16
Attending: EMERGENCY MEDICINE
Payer: MEDICARE

## 2021-04-16 ENCOUNTER — APPOINTMENT (OUTPATIENT)
Dept: GENERAL RADIOLOGY | Age: 71
End: 2021-04-16
Payer: MEDICARE

## 2021-04-16 ENCOUNTER — APPOINTMENT (OUTPATIENT)
Dept: CT IMAGING | Age: 71
End: 2021-04-16
Payer: MEDICARE

## 2021-04-16 VITALS
WEIGHT: 140 LBS | BODY MASS INDEX: 23.9 KG/M2 | OXYGEN SATURATION: 95 % | DIASTOLIC BLOOD PRESSURE: 66 MMHG | TEMPERATURE: 97.7 F | RESPIRATION RATE: 23 BRPM | SYSTOLIC BLOOD PRESSURE: 150 MMHG | HEART RATE: 83 BPM | HEIGHT: 64 IN

## 2021-04-16 DIAGNOSIS — Z86.79 HISTORY OF CORONARY ARTERY DISEASE: ICD-10-CM

## 2021-04-16 DIAGNOSIS — M54.9 MID BACK PAIN: Primary | ICD-10-CM

## 2021-04-16 DIAGNOSIS — Z53.29 LEFT AGAINST MEDICAL ADVICE: ICD-10-CM

## 2021-04-16 PROBLEM — R07.9 CHEST PAIN: Status: ACTIVE | Noted: 2021-04-16

## 2021-04-16 LAB
A/G RATIO: 1.6 (ref 1.1–2.2)
ALBUMIN SERPL-MCNC: 4.9 G/DL (ref 3.4–5)
ALP BLD-CCNC: 76 U/L (ref 40–129)
ALT SERPL-CCNC: 11 U/L (ref 10–40)
ANION GAP SERPL CALCULATED.3IONS-SCNC: 12 MMOL/L (ref 3–16)
AST SERPL-CCNC: 19 U/L (ref 15–37)
BASOPHILS ABSOLUTE: 0 K/UL (ref 0–0.2)
BASOPHILS RELATIVE PERCENT: 0.4 %
BILIRUB SERPL-MCNC: 0.3 MG/DL (ref 0–1)
BUN BLDV-MCNC: 12 MG/DL (ref 7–20)
CALCIUM SERPL-MCNC: 9.9 MG/DL (ref 8.3–10.6)
CHLORIDE BLD-SCNC: 102 MMOL/L (ref 99–110)
CO2: 23 MMOL/L (ref 21–32)
CREAT SERPL-MCNC: <0.5 MG/DL (ref 0.6–1.2)
D DIMER: 220 NG/ML DDU (ref 0–229)
EOSINOPHILS ABSOLUTE: 0.1 K/UL (ref 0–0.6)
EOSINOPHILS RELATIVE PERCENT: 0.8 %
GFR AFRICAN AMERICAN: >60
GFR NON-AFRICAN AMERICAN: >60
GLOBULIN: 3.1 G/DL
GLUCOSE BLD-MCNC: 161 MG/DL (ref 70–99)
GLUCOSE BLD-MCNC: 185 MG/DL (ref 70–99)
HCT VFR BLD CALC: 41 % (ref 36–48)
HEMOGLOBIN: 13.4 G/DL (ref 12–16)
LYMPHOCYTES ABSOLUTE: 1.6 K/UL (ref 1–5.1)
LYMPHOCYTES RELATIVE PERCENT: 16.1 %
MCH RBC QN AUTO: 30.1 PG (ref 26–34)
MCHC RBC AUTO-ENTMCNC: 32.6 G/DL (ref 31–36)
MCV RBC AUTO: 92.3 FL (ref 80–100)
MONOCYTES ABSOLUTE: 0.7 K/UL (ref 0–1.3)
MONOCYTES RELATIVE PERCENT: 6.9 %
NEUTROPHILS ABSOLUTE: 7.7 K/UL (ref 1.7–7.7)
NEUTROPHILS RELATIVE PERCENT: 75.8 %
PDW BLD-RTO: 13.7 % (ref 12.4–15.4)
PERFORMED ON: ABNORMAL
PLATELET # BLD: 205 K/UL (ref 135–450)
PMV BLD AUTO: 8.3 FL (ref 5–10.5)
POTASSIUM SERPL-SCNC: 4.2 MMOL/L (ref 3.5–5.1)
PRO-BNP: 90 PG/ML (ref 0–124)
RBC # BLD: 4.44 M/UL (ref 4–5.2)
SODIUM BLD-SCNC: 137 MMOL/L (ref 136–145)
TOTAL PROTEIN: 8 G/DL (ref 6.4–8.2)
TROPONIN: <0.01 NG/ML
WBC # BLD: 10.2 K/UL (ref 4–11)

## 2021-04-16 PROCEDURE — 6370000000 HC RX 637 (ALT 250 FOR IP): Performed by: EMERGENCY MEDICINE

## 2021-04-16 PROCEDURE — 83880 ASSAY OF NATRIURETIC PEPTIDE: CPT

## 2021-04-16 PROCEDURE — 71045 X-RAY EXAM CHEST 1 VIEW: CPT

## 2021-04-16 PROCEDURE — 80053 COMPREHEN METABOLIC PANEL: CPT

## 2021-04-16 PROCEDURE — 36415 COLL VENOUS BLD VENIPUNCTURE: CPT

## 2021-04-16 PROCEDURE — 85379 FIBRIN DEGRADATION QUANT: CPT

## 2021-04-16 PROCEDURE — 99284 EMERGENCY DEPT VISIT MOD MDM: CPT

## 2021-04-16 PROCEDURE — 6370000000 HC RX 637 (ALT 250 FOR IP): Performed by: NURSE PRACTITIONER

## 2021-04-16 PROCEDURE — 84484 ASSAY OF TROPONIN QUANT: CPT

## 2021-04-16 PROCEDURE — 99283 EMERGENCY DEPT VISIT LOW MDM: CPT

## 2021-04-16 PROCEDURE — 85025 COMPLETE CBC W/AUTO DIFF WBC: CPT

## 2021-04-16 PROCEDURE — 93005 ELECTROCARDIOGRAM TRACING: CPT | Performed by: NURSE PRACTITIONER

## 2021-04-16 RX ORDER — SODIUM CHLORIDE 0.9 % (FLUSH) 0.9 %
10 SYRINGE (ML) INJECTION PRN
Status: CANCELLED | OUTPATIENT
Start: 2021-04-16

## 2021-04-16 RX ORDER — LANOLIN ALCOHOL/MO/W.PET/CERES
1000 CREAM (GRAM) TOPICAL DAILY
Status: CANCELLED | OUTPATIENT
Start: 2021-04-17

## 2021-04-16 RX ORDER — PROMETHAZINE HYDROCHLORIDE 25 MG/1
12.5 TABLET ORAL EVERY 6 HOURS PRN
Status: CANCELLED | OUTPATIENT
Start: 2021-04-16

## 2021-04-16 RX ORDER — ASPIRIN 81 MG/1
324 TABLET, CHEWABLE ORAL ONCE
Status: COMPLETED | OUTPATIENT
Start: 2021-04-16 | End: 2021-04-16

## 2021-04-16 RX ORDER — ACETAMINOPHEN 650 MG/1
650 SUPPOSITORY RECTAL EVERY 6 HOURS PRN
Status: CANCELLED | OUTPATIENT
Start: 2021-04-16

## 2021-04-16 RX ORDER — HYDRALAZINE HYDROCHLORIDE 20 MG/ML
10 INJECTION INTRAMUSCULAR; INTRAVENOUS EVERY 6 HOURS PRN
Status: CANCELLED | OUTPATIENT
Start: 2021-04-16

## 2021-04-16 RX ORDER — MORPHINE SULFATE 2 MG/ML
2 INJECTION, SOLUTION INTRAMUSCULAR; INTRAVENOUS EVERY 4 HOURS PRN
Status: CANCELLED | OUTPATIENT
Start: 2021-04-16 | End: 2021-04-17

## 2021-04-16 RX ORDER — MELATONIN
1000 DAILY
Status: CANCELLED | OUTPATIENT
Start: 2021-04-17

## 2021-04-16 RX ORDER — SODIUM CHLORIDE 9 MG/ML
25 INJECTION, SOLUTION INTRAVENOUS PRN
Status: CANCELLED | OUTPATIENT
Start: 2021-04-16

## 2021-04-16 RX ORDER — ACETAMINOPHEN 325 MG/1
650 TABLET ORAL EVERY 6 HOURS PRN
Status: CANCELLED | OUTPATIENT
Start: 2021-04-16

## 2021-04-16 RX ORDER — PANTOPRAZOLE SODIUM 40 MG/1
40 TABLET, DELAYED RELEASE ORAL
Status: CANCELLED | OUTPATIENT
Start: 2021-04-17

## 2021-04-16 RX ORDER — POLYETHYLENE GLYCOL 3350 17 G/17G
17 POWDER, FOR SOLUTION ORAL DAILY PRN
Status: CANCELLED | OUTPATIENT
Start: 2021-04-16

## 2021-04-16 RX ORDER — SODIUM CHLORIDE 9 MG/ML
INJECTION, SOLUTION INTRAVENOUS CONTINUOUS
Status: CANCELLED | OUTPATIENT
Start: 2021-04-16 | End: 2021-04-17

## 2021-04-16 RX ORDER — ONDANSETRON 2 MG/ML
4 INJECTION INTRAMUSCULAR; INTRAVENOUS EVERY 6 HOURS PRN
Status: CANCELLED | OUTPATIENT
Start: 2021-04-16

## 2021-04-16 RX ORDER — DILTIAZEM HYDROCHLORIDE 240 MG/1
240 CAPSULE, EXTENDED RELEASE ORAL 2 TIMES DAILY
Status: CANCELLED | OUTPATIENT
Start: 2021-04-16

## 2021-04-16 RX ORDER — NICOTINE POLACRILEX 4 MG
15 LOZENGE BUCCAL PRN
Status: CANCELLED | OUTPATIENT
Start: 2021-04-16

## 2021-04-16 RX ORDER — INSULIN LISPRO 100 [IU]/ML
0-3 INJECTION, SOLUTION INTRAVENOUS; SUBCUTANEOUS NIGHTLY
Status: CANCELLED | OUTPATIENT
Start: 2021-04-16

## 2021-04-16 RX ORDER — ROSUVASTATIN CALCIUM 10 MG/1
20 TABLET, COATED ORAL NIGHTLY
Status: CANCELLED | OUTPATIENT
Start: 2021-04-16

## 2021-04-16 RX ORDER — INSULIN LISPRO 100 [IU]/ML
0-6 INJECTION, SOLUTION INTRAVENOUS; SUBCUTANEOUS
Status: CANCELLED | OUTPATIENT
Start: 2021-04-17

## 2021-04-16 RX ORDER — ASPIRIN 81 MG/1
81 TABLET, CHEWABLE ORAL DAILY
Status: CANCELLED | OUTPATIENT
Start: 2021-04-17

## 2021-04-16 RX ORDER — DEXTROSE MONOHYDRATE 50 MG/ML
100 INJECTION, SOLUTION INTRAVENOUS PRN
Status: CANCELLED | OUTPATIENT
Start: 2021-04-16

## 2021-04-16 RX ORDER — LISINOPRIL 10 MG/1
20 TABLET ORAL 2 TIMES DAILY
Status: CANCELLED | OUTPATIENT
Start: 2021-04-17

## 2021-04-16 RX ORDER — DEXTROSE MONOHYDRATE 25 G/50ML
12.5 INJECTION, SOLUTION INTRAVENOUS PRN
Status: CANCELLED | OUTPATIENT
Start: 2021-04-16

## 2021-04-16 RX ORDER — SODIUM CHLORIDE 0.9 % (FLUSH) 0.9 %
10 SYRINGE (ML) INJECTION EVERY 12 HOURS SCHEDULED
Status: CANCELLED | OUTPATIENT
Start: 2021-04-16

## 2021-04-16 RX ORDER — NITROGLYCERIN 0.4 MG/1
0.4 TABLET SUBLINGUAL EVERY 5 MIN PRN
Status: CANCELLED | OUTPATIENT
Start: 2021-04-16

## 2021-04-16 RX ADMIN — ASPIRIN 324 MG: 81 TABLET, CHEWABLE ORAL at 19:02

## 2021-04-16 RX ADMIN — NITROGLYCERIN 1 INCH: 20 OINTMENT TOPICAL at 19:24

## 2021-04-16 ASSESSMENT — ENCOUNTER SYMPTOMS
VOMITING: 0
BACK PAIN: 1
NAUSEA: 0
ABDOMINAL PAIN: 0
SHORTNESS OF BREATH: 0
CHEST TIGHTNESS: 0
DIARRHEA: 0

## 2021-04-16 NOTE — Clinical Note
Patient Class: Observation [104]   REQUIRED: Diagnosis: Chest pain [370931]   Estimated Length of Stay: Estimated stay of less than 2 midnights

## 2021-04-16 NOTE — ED PROVIDER NOTES
I independently performed a history and physical on Dony Coleman. All diagnostic, treatment, and disposition decisions were made by myself in conjunction with the advanced practice provider. I have participated in the medical decision making and directed the treatment plan and disposition of the patient. For further details of [facility] emergency department encounter, please see the advanced practice provider's documentation. CHIEF COMPLAINT  Chief Complaint   Patient presents with    Back Pain     back pain since yesterday, pt worked in the yard two days ago but also has hx of MI and symptoms were back pain      Briefly, Dony Coleman is a 69 y.o. female  who presents to the ED complaining of R mid-thoracic back pain radiating to the central back without injury or SOB, no fevers or n/v/d or abd pain. Denies chest pains. However this is \"identical\" to the symptoms she had when she was seen for a STEMI a few years ago. Did not take aspirin today. No syncope. FOCUSED PHYSICAL EXAMINATION  BP (!) 150/66   Pulse 83   Temp 97.7 °F (36.5 °C)   Resp 23   Ht 5' 4\" (1.626 m)   Wt 140 lb (63.5 kg)   SpO2 95%   BMI 24.03 kg/m²    Focused physical examination notable for no acute distress, well-appearing, well-nourished, normal speech and mentation without obvious facial droop, no obvious rash. No obvious cranial nerve deficits on my initial exam. RRR CTAB, abd soft NTND. No posterior anterior or lateral chest wall ttp anywhere, no C/T/L spine ttp to palpation.     The 12 lead EKG was interpreted by me as follows:  Rate: normal with a rate of 88  Rhythm: sinus  Axis: normal  Intervals: normal IN, narrow QRS, normal QTc, RSR' pattern  ST segments: no ST elevations or depressions  T waves: no abnormal inversions  Non-specific T wave changes: present  Prior EKG comparison: EKG dated 3/10/21 is not significantly different    MDM:  Diagnostic considerations included acute coronary syndrome, pulmonary embolism, COPD/asthma, pneumonia, musculoskeletal, reflux/PUD/gastritis, pneumothorax, CHF, thoracic aortic dissection, anxiety    ED course was notable for mid back pain and right-sided back pain concerning for anginal equivalent based on previous presentation although EKG today shows an RSR prime pattern with nonspecific changes similar to previous. Initial troponin is negative however her improvement with nitroglycerin is further worrisome for anginal symptoms. D-dimer negative, arguing against PE or dissection. Hospitalist requested dissection CT scan regardless and this was declined by the patient. She was admitted to the hospital however ultimately decided to leave [address] despite our concern for anginal equivalent with risk for missing MI if discharged. During the patient's ED course, the patient was given:  Medications   aspirin chewable tablet 324 mg (324 mg Oral Given 4/16/21 1902)   nitroglycerin (NITRO-BID) 2 % ointment 1 inch (1 inch Topical Given 4/16/21 1924)        CLINICAL IMPRESSION  1. Mid back pain    2. History of coronary artery disease    3. Left against medical advice        DISPOSITION  Norma Brumfield was discharged AMA in fair condition. I strongly recommend more testing and/or admission. However, the patient refuses. The risks (including but not limited to further deteriotation and death) as well as the benefits were explained to the patient using layperson terms. Questions were sought and answered and the patient voiced understanding. However, the patient refuses any more testing or evaluation be done. I have encouraged the patient to return to have their evaluation completed as we are glad to do so. I have also instructed patient on the importance of follow-up and to return for any worsening or worrisome concerns.  The patient appears insightful to our conversation as well as my concerns, and seems competent to make informed medical decisions at this time. Pt is GCS 15, NAD upon signing out AMA. This chart was created using Dragon dictation software. Efforts were made by me to ensure accuracy, however some errors may be present due to limitations of this technology.             Pako Hendrix MD  04/16/21 [id number]

## 2021-04-16 NOTE — ED PROVIDER NOTES
1000 MCG tablet Take 1,000 mcg by mouth dailyHistorical Med      Multiple Vitamins-Minerals (CENTRUM SILVER) TABS Take by mouthHistorical Med      nitroGLYCERIN (NITROSTAT) 0.4 MG SL tablet Place 1 tablet under the tongue every 5 minutes as needed for Chest pain, Disp-25 tablet, R-2Normal      !! blood glucose monitor strips Test daily and as needed for symptoms of irregular blood glucose. Dispense sufficient amount for indicated testing frequency plus additional to accommodate PRN testing needs. , Disp-100 strip, R-3, Print      !! blood glucose monitor strips Test 3 times a day & as needed for symptoms of irregular blood glucose. Dispense sufficient amount for indicated testing frequency plus additional to accommodate PRN testing needs. Which ever the insurance will cover. , Disp-100 strip, R-3, Normal      Elastic Bandages & Supports (CARPAL TUNNEL WRIST DELUXE) MISC 1 each by Does not apply route nightly, Disp-1 each,R-0Print       !! - Potential duplicate medications found. Please discuss with provider. ALLERGIES     Asa arthritis strength-antacid [aspirin buff, al hyd-mg hyd]; Aspirin;  Atorvastatin; Erythromycin base; Macrolides and ketolides; Troleandomycin; Verapamil; and Sulfa antibiotics    FAMILYHISTORY       Family History   Problem Relation Age of Onset    Heart Disease Mother     Cancer Father         ? lymphoma ( when patient was very young)    Diabetes Sister     Thyroid Disease Daughter           SOCIAL HISTORY       Social History     Tobacco Use    Smoking status: Never Smoker    Smokeless tobacco: Never Used   Substance Use Topics    Alcohol use: No    Drug use: No       SCREENINGS             PHYSICAL EXAM    (up to 7 for level 4, 8 or more for level 5)     ED Triage Vitals [21 1839]   BP Temp Temp src Pulse Resp SpO2 Height Weight   (!) 168/74 97.7 °F (36.5 °C) -- 92 18 95 % 5' 4\" (1.626 m) 140 lb (63.5 kg)       Physical Exam  Vitals signs and nursing note reviewed. Constitutional:       Appearance: She is well-developed. She is not diaphoretic. HENT:      Head: Normocephalic and atraumatic. Right Ear: External ear normal.      Left Ear: External ear normal.   Eyes:      General:         Right eye: No discharge. Left eye: No discharge. Neck:      Musculoskeletal: Normal range of motion and neck supple. Vascular: No JVD. Cardiovascular:      Rate and Rhythm: Normal rate and regular rhythm. Pulses: Normal pulses. Heart sounds: Normal heart sounds. Pulmonary:      Effort: Pulmonary effort is normal. No respiratory distress. Breath sounds: Normal breath sounds. Abdominal:      Palpations: Abdomen is soft. Musculoskeletal: Normal range of motion. Back:    Skin:     General: Skin is warm and dry. Coloration: Skin is not pale. Neurological:      Mental Status: She is alert and oriented to person, place, and time.    Psychiatric:         Behavior: Behavior normal.         DIAGNOSTIC RESULTS   LABS:    Labs Reviewed   COMPREHENSIVE METABOLIC PANEL - Abnormal; Notable for the following components:       Result Value    Glucose 185 (*)     CREATININE <0.5 (*)     All other components within normal limits    Narrative:     Performed at:  [facility] [address]   Phone [phone]   POCT GLUCOSE - Abnormal; Notable for the following components:    POC Glucose 161 (*)     All other components within normal limits    Narrative:     Performed at:  [facility] [address]   Phone [phone]   CBC WITH AUTO DIFFERENTIAL    Narrative:     Performed at:  [facility] [address]   Phone [phone]   TROPONIN    Narrative:     Performed at:  [facility] [address] [facility]  [place] [address]   Phone [phone] BRAIN NATRIURETIC PEPTIDE    Narrative:     Performed at:  [facility]  [address] [place]  [place], [address] [facility]   Phone [phone]   D-DIMER, QUANTITATIVE    Narrative:     Performed at:  [facility]  [address], [address]   Phone [phone]       All other labs were within normal range or not returned as of this dictation. EKG: All EKG's are interpreted by the Emergency Department Physician in the absence of a cardiologist.  Please see their note for interpretation of EKG. RADIOLOGY:   Non-plain film images such as CT, Ultrasound and MRI are read by the radiologist. Plain radiographic images are visualized and preliminarily interpreted by the ED Provider with the below findings:        Interpretation per the Radiologist below, if available at the time of this note:    XR CHEST PORTABLE   Final Result   No acute cardiopulmonary process. No results found. PROCEDURES   Unless otherwise noted below, none     Procedures    CRITICAL CARE TIME   The total critical care time spent while evaluating and treating this patient was at least 32 minutes. This excludes time spent doing separately billable procedures. This includes time at the bedside, data interpretation, medication management, obtaining critical history from collateral sources if the patient is unable to provide it directly, and physician consultation. Specifics of interventions taken and potentially life-threatening diagnostic considerations are listed above in the medical decision making.       CONSULTS:  IP CONSULT TO HOSPITALIST      EMERGENCY DEPARTMENT COURSE and DIFFERENTIAL DIAGNOSIS/MDM:   Vitals:    Vitals:    04/16/21 1839 04/16/21 1939 04/16/21 2000   BP: (!) 168/74 (!) 143/66 (!) 150/66   Pulse: 92 83 83   Resp: 18 14 23   Temp: 97.7 °F (36.5 °C)     SpO2: 95% 96% 95%   Weight: 140 lb (63.5 kg)     Height: 5' 4\" (1.626 m)         Patient was given the following medications:  Medications   aspirin chewable tablet 324 mg (324 mg Oral Given 4/16/21 1902)   nitroglycerin (NITRO-BID) 2 % ointment 1 inch (1 inch Topical Given 4/16/21 1924)           Briefly, this is a 69year old female that presents to the emergency department complaining of a squeezing sensation to her upper right back that is since moved to the center of her back. She reports that she had a heart attack 3 years ago and presented in similar fashion but at that visit had some nausea and abdominal pain as well. She reports onset of symptoms yesterday, they do wax and wane, she rates her pain as a 4 of 10. States that she forgot to take her aspirin today. Concerned that pain may be related to working in the yard two days ago vs. Heart attack. CBC is unremarkable. CMP unremarkable. Troponin negative. BNP 90. Dimer 220. Chest x-ray shows no acute cardiopulmonary process. The patient is pain-free following nitroglycerin and aspirin. We did discuss admission, the patient was initially agreeable. She will be admitted to hospitalist services, after speaking with hospitalist, he did request a CT to rule out dissection, this was ordered. She then told the hospitalist that she would not stay in the hospital as she was worried with Covid. We did discuss risks of leaving, the patient is adamant that she will not remain for chest pain work-up. The patient has decided to leave against medical advice. The patient is awake and alert, non-intoxicated, non-suicidal, nonpsychotic. There is no medical condition that prevents or inhibits their understanding of the risks/benefits of their decision. I discussed the nature and purpose, risks and benefits, as well as, the alternatives of admission with Maria M Melendez. Maria M Melendez was given the time and opportunity to ask questions and consider their options, and after the discussion, Maria M Melendez decided to refuse.  I informed Stephany Awan that refusal could lead to, but was not limited to, death, permanent disability, or severe pain. If present, I asked the relatives or significant others of Stephany Awan to dissuade them without success. Prior to refusing, their nurse and I determined and agreed that Stephany Awan had the capacity to make this decision and understood the consequences of that decision. Stephany Awan signed the refusal of treatment form and their nurse signed the form agreeing that the patient/guardian had received informed consent. After refusal, I made every reasonable opportunity to treat Stephany Awan to the best of my ability. FINAL IMPRESSION      1. Mid back pain    2. History of coronary artery disease    3. Left against medical advice          DISPOSITION/PLAN   DISPOSITION Morgan 04/16/2021 09:09:40 PM      PATIENT REFERREDTO:  No follow-up provider specified.     DISCHARGE MEDICATIONS:  Discharge Medication List as of 4/16/2021  9:26 PM          DISCONTINUED MEDICATIONS:  Discharge Medication List as of 4/16/2021  9:26 PM                 (Please note that portions of this note were completed with a voice recognition program.  Efforts were made to edit the dictations but occasionally words are mis-transcribed.)    YAYA Abbott CNP (electronically signed)            YAYA Abbott CNP  04/16/21 8782

## 2021-04-17 LAB
EKG ATRIAL RATE: 88 BPM
EKG DIAGNOSIS: NORMAL
EKG P AXIS: 32 DEGREES
EKG P-R INTERVAL: 118 MS
EKG Q-T INTERVAL: 360 MS
EKG QRS DURATION: 82 MS
EKG QTC CALCULATION (BAZETT): 435 MS
EKG R AXIS: -22 DEGREES
EKG T AXIS: 3 DEGREES
EKG VENTRICULAR RATE: 88 BPM

## 2021-04-17 PROCEDURE — 93010 ELECTROCARDIOGRAM REPORT: CPT | Performed by: INTERNAL MEDICINE

## 2021-04-17 NOTE — CONSULTS
Hospital Medicine Consult Note    4/16/2021    Date of Admission: 4/16/2021    Date of Service: Pt seen/examined on 4/16/2021 and admitted to observation. Addendum:  Below is my plan prior to patient signing out [address] She had initially indicated she is not sure she wants to stay. Following discussion with emergency room PA, I was informed that patient has agreed to stay. Shortly after completing H&P, patient has now decided to leave [address] Assessment/plan:  1. Reproducible right upper back pain. Etiology is likely musculoskeletal.  Given history of MI with similar presentation, admitted for chest pain rule out. Continue telemetry, obtain serial troponin, plan for stress test in the morning. Continue aspirin, statin. .  Nitroglycerin, as needed morphine. Check lipid profile in the morning. CTA-chest to rule out dissection. 2. Other comorbidities: history of fibromyalgia, gastroesophageal reflux disease, CAD, hyperlipidemia, essential hypertension, diabetes type 2. Activities: Up with assist  Prophylaxis: Subcutaneous Lovenox  Code status: Full code    ==========================================================  Chief complaint:  Chief Complaint   Patient presents with    Back Pain     back pain since yesterday, pt worked in the yard two days ago but also has hx of MI and symptoms were back pain        History of Presenting Illness: This is a pleasant 69 y.o. female with history of fibromyalgia, gastroesophageal reflux disease, CAD, hyperlipidemia, essential hypertension, diabetes type 2, who presents to the emergency room with complaints of right upper back pain that has been present for a day and a half. Patient reports that she was recently walking in her yard, after which she started with the back pain. She does not have chest pain or shortness of breath.   She reports current presentation is similar to when she had MI in the past.  EKG and troponin obtained in the emergency room were unremarkable. Patient is being admitted for chest pain rule out. Past Medical History:      Diagnosis Date    Allergic rhinitis     Fibromyalgia     GERD (gastroesophageal reflux disease)     Heart attack ([address]     Heart disease     Herniated disc     Hyperlipidemia     Hypertension     Type II or unspecified type diabetes mellitus without mention of complication, not stated as uncontrolled        Past Surgical History:      Procedure Laterality Date    CORONARY ANGIOPLASTY WITH STENT PLACEMENT  2018    HYMENECTOMY         Medications (prior to admission):  Prior to Admission medications    Medication Sig Start Date End Date Taking?  Authorizing Provider   lisinopril (PRINIVIL;ZESTRIL) 20 MG tablet Take 1 tablet by mouth 2 times daily 3/10/21  Yes Maura Frances MD   dilTIAZem [facility]) 240 MG extended release capsule Take 1 capsule by mouth 2 times daily 3/10/21  Yes Maura Frances MD   glipiZIDE (GLUCOTROL) 5 MG tablet Take 1 tablet by mouth Daily with supper 2/22/21  Yes Nellie Corrales DO   metFORMIN (GLUCOPHAGE-XR) 500 MG extended release tablet Take 2 tablets by mouth 2 times daily 2/4/21  Yes Nellie Corrales DO   pantoprazole (PROTONIX) 40 MG tablet Take 1 tablet by mouth every morning (before breakfast) 2/4/21  Yes Nellie Corrales DO   rosuvastatin (CRESTOR) 10 MG tablet Take 1 tablet by mouth nightly 2/4/21  Yes Nellie Corrales DO   aspirin EC 81 MG EC tablet Take 1 tablet by mouth daily 9/11/20  Yes Maura Frances MD   vitamin D3 (CHOLECALCIFEROL) 25 MCG (1000 UT) TABS tablet Take 1,000 Units by mouth daily   Yes Historical Provider, MD   vitamin B-12 (CYANOCOBALAMIN) 1000 MCG tablet Take 1,000 mcg by mouth daily 1/15/20  Yes Historical Provider, MD   Multiple Vitamins-Minerals (CENTRUM SILVER) TABS Take by mouth   Yes Historical Provider, MD   nitroGLYCERIN (NITROSTAT) 0.4 MG SL tablet Place 1 tablet under the tongue every 5 minutes as needed for Chest pain 19  Yes Mackenzie Dobbins MD   blood glucose monitor strips Test daily and as needed for symptoms of irregular blood glucose. Dispense sufficient amount for indicated testing frequency plus additional to accommodate PRN testing needs. 21   Kathryn Asher, DO   blood glucose monitor strips Test 3 times a day & as needed for symptoms of irregular blood glucose. Dispense sufficient amount for indicated testing frequency plus additional to accommodate PRN testing needs. Which ever the insurance will cover. 21   Kathryn Asher, DO   Elastic Bandages & Supports (CARPAL TUNNEL WRIST DELUXE) [address] 1 each by Does not apply route nightly 20   Kathryn Asher, DO       Allergy(ies):  [place] arthritis strength-antacid [aspirin buff, al hyd-mg hyd]; Aspirin; Atorvastatin; Erythromycin base; Macrolides and ketolides; Troleandomycin; Verapamil; and Sulfa antibiotics    Social History:  TOBACCO:  reports that she has never smoked. She has never used smokeless tobacco.  ETOH:  reports no history of alcohol use. Family History:      Problem Relation Age of Onset    Heart Disease Mother     Cancer Father         ? lymphoma ( when patient was very young)    Diabetes Sister     Thyroid Disease Daughter        Review of Systems:  Pertinent positives are listed in HPI. At least 10-point ROS reviewed and were negative. Vitals and physical examination:  BP (!) 150/66   Pulse 83   Temp 97.7 °F (36.5 °C)   Resp 23   Ht 5' 4\" (1.626 m)   Wt 140 lb (63.5 kg)   SpO2 95%   BMI 24.03 kg/m²   Gen/overall appearance: Not in acute distress. Alert. Oriented x3. Head: Normocephalic, atraumatic  Eyes: EOMI, good acuity  ENT: Oral mucosa moist  Neck: No JVD, thyromegaly  CVS: Nml S1S2, no MRG, RRR  Pulm: Clear bilaterally. No crackles/wheezes  Gastrointestinal: Soft, NT/ND, +BS  Musculoskeletal: Reproducible right upper back tenderness to palpation. No edema. Warm  Neuro: No focal deficit.

## 2021-04-17 NOTE — ED NOTES
Talked to pt and family about the benefits of getting admitted and the risks of leaving. The pt states she will follow up with her cardiologist and she does not want to stay in the hospital with Agustin. Pt advised to come back if symptoms come back or get worse. Pt v/u.       Makayla Little RN  04/16/21 1041

## 2021-06-14 DIAGNOSIS — E11.9 TYPE 2 DIABETES MELLITUS WITHOUT COMPLICATION, WITHOUT LONG-TERM CURRENT USE OF INSULIN (HCC): ICD-10-CM

## 2021-06-14 DIAGNOSIS — E78.5 HYPERLIPIDEMIA LDL GOAL <70: ICD-10-CM

## 2021-06-14 LAB
A/G RATIO: 2.2 (ref 1.1–2.2)
ALBUMIN SERPL-MCNC: 4.8 G/DL (ref 3.4–5)
ALP BLD-CCNC: 68 U/L (ref 40–129)
ALT SERPL-CCNC: 14 U/L (ref 10–40)
ANION GAP SERPL CALCULATED.3IONS-SCNC: 14 MMOL/L (ref 3–16)
AST SERPL-CCNC: 15 U/L (ref 15–37)
BILIRUB SERPL-MCNC: 0.3 MG/DL (ref 0–1)
BUN BLDV-MCNC: 15 MG/DL (ref 7–20)
CALCIUM SERPL-MCNC: 9.6 MG/DL (ref 8.3–10.6)
CHLORIDE BLD-SCNC: 99 MMOL/L (ref 99–110)
CHOLESTEROL, TOTAL: 143 MG/DL (ref 0–199)
CO2: 24 MMOL/L (ref 21–32)
CREAT SERPL-MCNC: 0.6 MG/DL (ref 0.6–1.2)
CREATININE URINE: 62 MG/DL (ref 28–259)
GFR AFRICAN AMERICAN: >60
GFR NON-AFRICAN AMERICAN: >60
GLOBULIN: 2.2 G/DL
GLUCOSE BLD-MCNC: 150 MG/DL (ref 70–99)
HDLC SERPL-MCNC: 48 MG/DL (ref 40–60)
LDL CHOLESTEROL CALCULATED: 51 MG/DL
MICROALBUMIN UR-MCNC: <1.2 MG/DL
MICROALBUMIN/CREAT UR-RTO: NORMAL MG/G (ref 0–30)
POTASSIUM SERPL-SCNC: 4.5 MMOL/L (ref 3.5–5.1)
SODIUM BLD-SCNC: 137 MMOL/L (ref 136–145)
TOTAL PROTEIN: 7 G/DL (ref 6.4–8.2)
TRIGL SERPL-MCNC: 220 MG/DL (ref 0–150)
TSH REFLEX: 1.24 UIU/ML (ref 0.27–4.2)
VLDLC SERPL CALC-MCNC: 44 MG/DL

## 2021-06-15 LAB
ESTIMATED AVERAGE GLUCOSE: 154.2 MG/DL
HBA1C MFR BLD: 7 %

## 2021-06-21 ENCOUNTER — OFFICE VISIT (OUTPATIENT)
Dept: INTERNAL MEDICINE CLINIC | Age: 71
End: 2021-06-21
Payer: MEDICARE

## 2021-06-21 VITALS
DIASTOLIC BLOOD PRESSURE: 58 MMHG | OXYGEN SATURATION: 98 % | WEIGHT: 142.4 LBS | HEART RATE: 88 BPM | BODY MASS INDEX: 24.44 KG/M2 | SYSTOLIC BLOOD PRESSURE: 136 MMHG

## 2021-06-21 DIAGNOSIS — E11.9 TYPE 2 DIABETES MELLITUS WITHOUT COMPLICATION, WITHOUT LONG-TERM CURRENT USE OF INSULIN (HCC): ICD-10-CM

## 2021-06-21 DIAGNOSIS — R13.10 FOOD STICKS ON SWALLOWING: ICD-10-CM

## 2021-06-21 DIAGNOSIS — I25.2 HISTORY OF MI (MYOCARDIAL INFARCTION): ICD-10-CM

## 2021-06-21 DIAGNOSIS — K21.9 GASTROESOPHAGEAL REFLUX DISEASE, UNSPECIFIED WHETHER ESOPHAGITIS PRESENT: ICD-10-CM

## 2021-06-21 DIAGNOSIS — E78.5 HYPERLIPIDEMIA LDL GOAL <70: ICD-10-CM

## 2021-06-21 DIAGNOSIS — I25.10 CORONARY ARTERY DISEASE INVOLVING NATIVE CORONARY ARTERY OF NATIVE HEART WITHOUT ANGINA PECTORIS: ICD-10-CM

## 2021-06-21 DIAGNOSIS — Z12.31 ENCOUNTER FOR SCREENING MAMMOGRAM FOR MALIGNANT NEOPLASM OF BREAST: ICD-10-CM

## 2021-06-21 DIAGNOSIS — Z12.11 COLON CANCER SCREENING: ICD-10-CM

## 2021-06-21 DIAGNOSIS — I10 ESSENTIAL HYPERTENSION: ICD-10-CM

## 2021-06-21 DIAGNOSIS — M65.329 TRIGGER INDEX FINGER, UNSPECIFIED LATERALITY: Primary | ICD-10-CM

## 2021-06-21 PROBLEM — I21.29 ACUTE MI, TRUE POSTERIOR WALL (HCC): Status: RESOLVED | Noted: 2018-04-20 | Resolved: 2021-06-21

## 2021-06-21 PROCEDURE — 3051F HG A1C>EQUAL 7.0%<8.0%: CPT | Performed by: INTERNAL MEDICINE

## 2021-06-21 PROCEDURE — 1090F PRES/ABSN URINE INCON ASSESS: CPT | Performed by: INTERNAL MEDICINE

## 2021-06-21 PROCEDURE — G8420 CALC BMI NORM PARAMETERS: HCPCS | Performed by: INTERNAL MEDICINE

## 2021-06-21 PROCEDURE — 4040F PNEUMOC VAC/ADMIN/RCVD: CPT | Performed by: INTERNAL MEDICINE

## 2021-06-21 PROCEDURE — G8427 DOCREV CUR MEDS BY ELIG CLIN: HCPCS | Performed by: INTERNAL MEDICINE

## 2021-06-21 PROCEDURE — 1123F ACP DISCUSS/DSCN MKR DOCD: CPT | Performed by: INTERNAL MEDICINE

## 2021-06-21 PROCEDURE — 1036F TOBACCO NON-USER: CPT | Performed by: INTERNAL MEDICINE

## 2021-06-21 PROCEDURE — G8399 PT W/DXA RESULTS DOCUMENT: HCPCS | Performed by: INTERNAL MEDICINE

## 2021-06-21 PROCEDURE — 2022F DILAT RTA XM EVC RTNOPTHY: CPT | Performed by: INTERNAL MEDICINE

## 2021-06-21 PROCEDURE — 3017F COLORECTAL CA SCREEN DOC REV: CPT | Performed by: INTERNAL MEDICINE

## 2021-06-21 PROCEDURE — 99214 OFFICE O/P EST MOD 30 MIN: CPT | Performed by: INTERNAL MEDICINE

## 2021-06-21 RX ORDER — GLIPIZIDE 5 MG/1
2.5 TABLET ORAL
Qty: 90 TABLET | Refills: 3
Start: 2021-06-21 | End: 2022-04-12 | Stop reason: SDUPTHER

## 2021-06-21 NOTE — PROGRESS NOTES
Patient: Truitt Burkitt is a 66 y.o. female who presents today with the following Chief Complaint(s):  Chief Complaint   Patient presents with    Check-Up    Swelling     fingers on the both hands       HPI     Here today for follow up. CAD/HTN/Paliptations- States that she started having palpitations at night- saw her cardiologist, Dr. Kay Castano on 3/1/21. He stopped her hydralazine and had her increase her lisinopril to 40 mg and changed diltiazem to 240 mg BID. Palpitations have since subsided. Did see Dr. Kay Castano for f/u on her CAD on 3/31/21 and made no further adjustments. Follows with Dr. Kay Castano q 6 months. Did not recommend additional testing. Patient is wondering if she needs any additional testing. Last stress test was in March 2019, 11 monhs after cath. Went to ED in April d/t mid-back pain. Was worried it was an MI- cardiac w/u was negative. DM- has had some instances of low blood sugars- down to 60 after doing yard work and not eating. Had 1 episode of low blood sugar for no known reason. Low sugars are much better since decreasing glipizide to once daily. Sugars are fluctuating quit a bit, up to 200's at times. Fasting sugars are typically around 150. HS sugars are around 130-140. Is taking glipizide 5 mg qam. Higher sugars tend to be in the evenings. Is taking metformin ER 1,000 mg BID w/meals. Was previously on Januvia (2019) but was too expensive. HLD- on Crestor 10 mg qd. Is also c/o b/l hand pains- having trigger fingers b/l hands. Also c/o feeling tired all of the time. Does not feel rested when she wakes up in the morning. Also states that she wakes up frequently at night to use the restroom. Does also need to rest frequently with activity. Not aware of snoring when she sleeps but her  does snore \"terrible\". Does drink water all day, up until bedtime. Drinks water all of the time d/t dry mouth and throat. Also wakes up with a dry mouth.      Has not had her mammogram done since 2016. Has not yet had colonoscopy or EGD- still getting food and pills stuck. Has diarrhea, worse with metformin (as opposed to Metformin ER) but cannot afford alternatives to metformin. Lab Results   Component Value Date     06/14/2021    K 4.5 06/14/2021    CL 99 06/14/2021    CO2 24 06/14/2021    BUN 15 06/14/2021    CREATININE 0.6 06/14/2021    GLUCOSE 150 (H) 06/14/2021    CALCIUM 9.6 06/14/2021    PROT 7.0 06/14/2021    LABALBU 4.8 06/14/2021    BILITOT 0.3 06/14/2021    ALKPHOS 68 06/14/2021    AST 15 06/14/2021    ALT 14 06/14/2021    LABGLOM >60 06/14/2021    GFRAA >60 06/14/2021    AGRATIO 2.2 06/14/2021    GLOB 2.2 06/14/2021       Lab Results   Component Value Date    LABA1C 7.0 06/14/2021     Lab Results   Component Value Date    .2 06/14/2021     Lab Results   Component Value Date    LABA1C 7.0 06/14/2021    LABA1C 6.4 02/22/2021    LABA1C 7.2 08/04/2020     Lab Results   Component Value Date    LABMICR <1.20 06/14/2021    LDLCALC 51 06/14/2021    CREATININE 0.6 06/14/2021     Lab Results   Component Value Date    CHOL 143 06/14/2021    CHOL 144 08/04/2020    CHOL 136 08/20/2019     Lab Results   Component Value Date    TRIG 220 (H) 06/14/2021    TRIG 157 (H) 08/04/2020    TRIG 130 08/20/2019     Lab Results   Component Value Date    HDL 48 06/14/2021    HDL 47 08/04/2020    HDL 44 08/20/2019     Lab Results   Component Value Date    LDLCALC 51 06/14/2021    LDLCALC 66 08/04/2020    LDLCALC 66 08/20/2019     Lab Results   Component Value Date    LABVLDL 44 06/14/2021    LABVLDL 31 08/04/2020    LABVLDL 26 08/20/2019     No results found for: CHOLHDLRATIO      Allergies   Allergen Reactions    Asa Arthritis Strength-Antacid [Aspirin Buff, Al Hyd-Mg Hyd] Nausea Only     Per patient stopped taking because it upset her stomach. She denies itching, swelling, rash.      Aspirin      Other reaction(s): GI Upset    Atorvastatin      Other reaction(s): Muscle Aches    Erythromycin Base Hives    Macrolides And Ketolides     Troleandomycin     Verapamil      Other reaction(s): Other (See Comments)  Low heart rate     Sulfa Antibiotics Rash      Past Medical History:   Diagnosis Date    Allergic rhinitis     Fibromyalgia     GERD (gastroesophageal reflux disease)     [address] [address]     Heart disease     Herniated disc     Hyperlipidemia     Hypertension     Type II or unspecified type diabetes mellitus without mention of complication, not stated as uncontrolled       Past Surgical History:   Procedure Laterality Date    CORONARY ANGIOPLASTY WITH STENT PLACEMENT  2018    HYMENECTOMY        Social History     Socioeconomic History    Marital status:      Spouse name: Not on file    Number of children: Not on file    Years of education: Not on file    Highest education level: Not on file   Occupational History    Not on file   Tobacco Use    Smoking status: Never Smoker    Smokeless tobacco: Never Used   Vaping Use    Vaping Use: Never used   Substance and Sexual Activity    Alcohol use: No    Drug use: No    Sexual activity: Not on file     Comment:    Other Topics Concern    Not on file   Social History Narrative    Not on file     Social Determinants of Health     Financial Resource Strain: Low Risk     Difficulty of Paying Living Expenses: Not hard at all   Food Insecurity: No Food Insecurity    Worried About Running Out of Food in the Last Year: Never true    Yoko of Food in the Last Year: Never true   Transportation Needs: No Transportation Needs    Lack of Transportation (Medical): No    Lack of Transportation (Non-Medical):  No   Physical Activity:     Days of Exercise per Week:     Minutes of Exercise per Session:    Stress:     Feeling of Stress :    Social Connections:     Frequency of Communication with Friends and Family:     Frequency of Social Gatherings with Friends and Family:     Attends Alevism Services:     Active Member of Clubs or Organizations:     Attends Club or Organization Meetings:     Marital Status:    Intimate Partner Violence:     Fear of Current or Ex-Partner:     Emotionally Abused:     Physically Abused:     Sexually Abused:      Family History   Problem Relation Age of Onset    Heart Disease Mother     Cancer Father         ? lymphoma ( when patient was very young)    Diabetes Sister     Thyroid Disease Daughter         Outpatient Medications Prior to Visit   Medication Sig Dispense Refill    lisinopril (PRINIVIL;ZESTRIL) 20 MG tablet Take 1 tablet by mouth 2 times daily 180 tablet 3    dilTIAZem (TIAZAC) 240 MG extended release capsule Take 1 capsule by mouth 2 times daily 180 capsule 3    blood glucose monitor strips Test daily and as needed for symptoms of irregular blood glucose. Dispense sufficient amount for indicated testing frequency plus additional to accommodate PRN testing needs. 100 strip 3    blood glucose monitor strips Test 3 times a day & as needed for symptoms of irregular blood glucose. Dispense sufficient amount for indicated testing frequency plus additional to accommodate PRN testing needs. Which ever the insurance will cover.  100 strip 3    metFORMIN (GLUCOPHAGE-XR) 500 MG extended release tablet Take 2 tablets by mouth 2 times daily 360 tablet 3    pantoprazole (PROTONIX) 40 MG tablet Take 1 tablet by mouth every morning (before breakfast) 90 tablet 3    rosuvastatin (CRESTOR) 10 MG tablet Take 1 tablet by mouth nightly 90 tablet 3    Elastic Bandages & Supports (CARPAL TUNNEL WRIST DELUXE) MISC 1 each by Does not apply route nightly 1 each 0    aspirin EC 81 MG EC tablet Take 1 tablet by mouth daily 90 tablet 1    vitamin D3 (CHOLECALCIFEROL) 25 MCG (1000 UT) TABS tablet Take 1,000 Units by mouth daily      vitamin B-12 (CYANOCOBALAMIN) 1000 MCG tablet Take 1,000 mcg by mouth daily      Multiple Vitamins-Minerals (CENTRUM SILVER) TABS Take by mouth      nitroGLYCERIN (NITROSTAT) 0.4 MG SL tablet Place 1 tablet under the tongue every 5 minutes as needed for Chest pain 25 tablet 2    glipiZIDE (GLUCOTROL) 5 MG tablet Take 1 tablet by mouth Daily with supper 90 tablet 3     No facility-administered medications prior to visit. Patient'spast medical history, surgical history, family history, medications,  and allergies  were all reviewed and updated as appropriate today. Review of Systems   Constitutional: Negative for appetite change, fatigue and fever. Respiratory: Negative for chest tightness and shortness of breath. Cardiovascular: Negative for chest pain. Gastrointestinal: Negative for constipation and diarrhea. Skin: Negative for rash. BP (!) 136/58   Pulse 88   Wt 142 lb 6.4 oz (64.6 kg)   SpO2 98%   BMI 24.44 kg/m²   Physical Exam  Vitals and nursing note reviewed. Constitutional:       Appearance: She is well-developed. She is not toxic-appearing. HENT:      Head: Normocephalic. Right Ear: Tympanic membrane, ear canal and external ear normal.      Left Ear: Tympanic membrane, ear canal and external ear normal.   Eyes:      General: No scleral icterus. Extraocular Movements: Extraocular movements intact. Conjunctiva/sclera: Conjunctivae normal.      Pupils: Pupils are equal, round, and reactive to light. Neck:      Thyroid: No thyroid mass or thyromegaly. Vascular: No carotid bruit. Cardiovascular:      Rate and Rhythm: Normal rate and regular rhythm. Heart sounds: Normal heart sounds. No murmur heard. Pulmonary:      Effort: Pulmonary effort is normal.      Breath sounds: Normal breath sounds. Abdominal:      Palpations: Abdomen is soft. Tenderness: There is no abdominal tenderness. Musculoskeletal:      Right lower leg: No edema. Left lower leg: No edema. Lymphadenopathy:      Cervical: No cervical adenopathy.    Neurological:      General: No focal deficit present. Mental Status: She is alert and oriented to person, place, and time. Psychiatric:         Mood and Affect: Mood normal.         Behavior: Behavior normal. Behavior is cooperative. ASSESSMENT/PLAN:    Problem List Items Addressed This Visit     Coronary artery disease involving native coronary artery of native heart without angina pectoris     Asymptomatic. Continues to follow with Dr. Marcelle Acuna. Remains on aspirin, lisinopril 40 mg daily, Cardizem  mg twice daily, and Crestor 10 mg daily with as needed nitroglycerin. Food sticks on swallowing      Encourage patient to follow-up with GI. Relevant Orders    SUMIT Finch MD, Gastroenterology, [facility]    GERD (gastroesophageal reflux disease)     Encourage patient to follow-up with GI. Relevant Orders    SUMIT Finch MD, Gastroenterology, [facility]    History of MI (myocardial infarction)     Status post MI in April 2018 that was treated with PTCA and stenting. Follows with Dr. Marcelle Acuna. On ASA, lisinopril 40 mg qd, diltiazem  mg mg twice daily, and Crestor 10 mg qd with NTG SL prn. Sx of CAD were NULL and stomach pain. HTN (hypertension)     Patient was having palpitations. When she saw Dr. Marcelle Acuna in March he discontinued patient's hydralazine in March and increased her lisinopril to 40 mg daily and diltiazem to 240 mg twice daily. Hyperlipidemia LDL goal <70     Well-controlled. Continue Crestor 10 mg daily. Relevant Orders    Lipid Panel    Comprehensive Metabolic Panel    Type 2 diabetes mellitus without complication, without long-term current use of insulin (HCC)      Hemoglobin A1c has increased since glipizide was decreased to once daily. Change glipizide to 2.5 mg twice daily (was having problems with low sugars in the mid morning to afternoon when she was taking 5 mg twice daily) and continue Metformin ER 1000 mg twice daily.   May need to consider adding Januvia cost may be prohibitive. Unable to use Actos due to history of heart disease. Relevant Medications    glipiZIDE (GLUCOTROL) 5 MG tablet    Other Relevant Orders    [facility] DIABETES FOOT EXAM (Completed)    Hemoglobin A1C      Other Visit Diagnoses     Trigger index finger, unspecified laterality    -  Primary    Relevant Orders    Grzegorz Romero MD, Orthopedic Surgery, [facility]    Encounter for screening mammogram for malignant neoplasm of breast        Relevant Orders    [facility] ORTEGA DIGITAL SCREEN BILATERAL    Colon cancer screening        Relevant Orders    [facility] - Amee French MD, Gastroenterology, [facility]          Current Outpatient Medications   Medication Sig Dispense Refill    glipiZIDE (GLUCOTROL) 5 MG tablet Take 0.5 tablets by mouth 2 times daily (before meals) 90 tablet 3    lisinopril (PRINIVIL;ZESTRIL) 20 MG tablet Take 1 tablet by mouth 2 times daily 180 tablet 3    dilTIAZem (TIAZAC) 240 MG extended release capsule Take 1 capsule by mouth 2 times daily 180 capsule 3    blood glucose monitor strips Test daily and as needed for symptoms of irregular blood glucose. Dispense sufficient amount for indicated testing frequency plus additional to accommodate PRN testing needs. 100 strip 3    blood glucose monitor strips Test 3 times a day & as needed for symptoms of irregular blood glucose. Dispense sufficient amount for indicated testing frequency plus additional to accommodate PRN testing needs. Which ever the insurance will cover.  100 strip 3    metFORMIN (GLUCOPHAGE-XR) 500 MG extended release tablet Take 2 tablets by mouth 2 times daily 360 tablet 3    pantoprazole (PROTONIX) 40 MG tablet Take 1 tablet by mouth every morning (before breakfast) 90 tablet 3    rosuvastatin (CRESTOR) 10 MG tablet Take 1 tablet by mouth nightly 90 tablet 3    Elastic Bandages & Supports (CARPAL TUNNEL WRIST DELUXE) MISC 1 each by Does not apply route nightly 1 each 0   

## 2021-06-27 ASSESSMENT — ENCOUNTER SYMPTOMS
CHEST TIGHTNESS: 0
SHORTNESS OF BREATH: 0
CONSTIPATION: 0
DIARRHEA: 0

## 2021-06-27 NOTE — ASSESSMENT & PLAN NOTE
Hemoglobin A1c has increased since glipizide was decreased to once daily. Change glipizide to 2.5 mg twice daily (was having problems with low sugars in the mid morning to afternoon when she was taking 5 mg twice daily) and continue Metformin ER 1000 mg twice daily. May need to consider adding Januvia cost may be prohibitive. Unable to use Actos due to history of heart disease.

## 2021-06-27 NOTE — ASSESSMENT & PLAN NOTE
Patient was having palpitations. When she saw Dr. Sulma Hsu in March he discontinued patient's hydralazine in March and increased her lisinopril to 40 mg daily and diltiazem to 240 mg twice daily.

## 2021-06-27 NOTE — ASSESSMENT & PLAN NOTE
Status post MI in April 2018 that was treated with PTCA and stenting. Follows with Dr. Jey Chandler. On ASA, lisinopril 40 mg qd, diltiazem  mg mg twice daily, and Crestor 10 mg qd with NTG SL prn. Sx of CAD were NULL and stomach pain.

## 2021-07-21 ENCOUNTER — TELEPHONE (OUTPATIENT)
Dept: INTERNAL MEDICINE CLINIC | Age: 71
End: 2021-07-21

## 2021-07-21 NOTE — TELEPHONE ENCOUNTER
Yes, I changed her glipizide to 2.5 mg BID at her appointment on 6/21/21 as she was having some episodes of low blood sugars and I worried that glipizide 5 mg BID was causing this drop.    saw

## 2021-08-03 ENCOUNTER — HOSPITAL ENCOUNTER (OUTPATIENT)
Dept: GENERAL RADIOLOGY | Age: 71
Discharge: HOME | End: 2021-08-03
Payer: MEDICARE

## 2021-08-03 ENCOUNTER — OFFICE VISIT (OUTPATIENT)
Dept: INTERNAL MEDICINE CLINIC | Age: 71
End: 2021-08-03
Payer: MEDICARE

## 2021-08-03 ENCOUNTER — HOSPITAL ENCOUNTER (OUTPATIENT)
Age: 71
Discharge: HOME | End: 2021-08-03
Payer: MEDICARE

## 2021-08-03 VITALS
DIASTOLIC BLOOD PRESSURE: 70 MMHG | BODY MASS INDEX: 24.65 KG/M2 | OXYGEN SATURATION: 97 % | HEART RATE: 85 BPM | WEIGHT: 143.6 LBS | SYSTOLIC BLOOD PRESSURE: 130 MMHG | TEMPERATURE: 97.5 F

## 2021-08-03 DIAGNOSIS — R26.89 LIMPING: ICD-10-CM

## 2021-08-03 DIAGNOSIS — M25.561 ACUTE PAIN OF RIGHT KNEE: ICD-10-CM

## 2021-08-03 DIAGNOSIS — M25.561 ACUTE PAIN OF RIGHT KNEE: Primary | ICD-10-CM

## 2021-08-03 PROCEDURE — 1090F PRES/ABSN URINE INCON ASSESS: CPT | Performed by: NURSE PRACTITIONER

## 2021-08-03 PROCEDURE — 73552 X-RAY EXAM OF FEMUR 2/>: CPT

## 2021-08-03 PROCEDURE — G8420 CALC BMI NORM PARAMETERS: HCPCS | Performed by: NURSE PRACTITIONER

## 2021-08-03 PROCEDURE — 4040F PNEUMOC VAC/ADMIN/RCVD: CPT | Performed by: NURSE PRACTITIONER

## 2021-08-03 PROCEDURE — G8399 PT W/DXA RESULTS DOCUMENT: HCPCS | Performed by: NURSE PRACTITIONER

## 2021-08-03 PROCEDURE — 3017F COLORECTAL CA SCREEN DOC REV: CPT | Performed by: NURSE PRACTITIONER

## 2021-08-03 PROCEDURE — 1036F TOBACCO NON-USER: CPT | Performed by: NURSE PRACTITIONER

## 2021-08-03 PROCEDURE — 99213 OFFICE O/P EST LOW 20 MIN: CPT | Performed by: NURSE PRACTITIONER

## 2021-08-03 PROCEDURE — 1123F ACP DISCUSS/DSCN MKR DOCD: CPT | Performed by: NURSE PRACTITIONER

## 2021-08-03 PROCEDURE — G8427 DOCREV CUR MEDS BY ELIG CLIN: HCPCS | Performed by: NURSE PRACTITIONER

## 2021-08-03 PROCEDURE — 73562 X-RAY EXAM OF KNEE 3: CPT

## 2021-08-03 PROCEDURE — 73610 X-RAY EXAM OF ANKLE: CPT

## 2021-08-03 ASSESSMENT — ENCOUNTER SYMPTOMS
COUGH: 0
SHORTNESS OF BREATH: 0
WHEEZING: 0

## 2021-08-03 NOTE — PROGRESS NOTES
Acute Office Visit  8/3/2021    SUBJECTIVE:    Patient ID: Rena Cerna is a 66 y.o. female. Chief Complaint   Patient presents with    Pain     right knee, ankle and leg pain, 3-4 days, has gotten worse, says she twisted her leg while gardening     HPI: The patient presents to the office for an acute visit. Pt reports that three-four days ago, she was working the garden and stepped down and twisted her leg when stepping out of the garden. She states that pain started around her right knee. Has intermittent pains in her right lateral upper leg and right ankle. Denies hip pains. States her knee was swollen, but this resolved. Knee pain getting worse. Limping to walk. Bending the knee hurts the worst. Reported as \"stiff. \" Feels better when in bed/rest.  Used biofreeze with some relief. History of left torn meniscus. Denies new back pains. Denies chest pain, palpitations, shortness of breath, trouble breathing, lightheadedness, dizziness or blurred vision. Allergies   Allergen Reactions    Asa Arthritis Strength-Antacid [Aspirin Buff, Al Hyd-Mg Hyd] Nausea Only     Per patient stopped taking because it upset her stomach. She denies itching, swelling, rash.  Aspirin      Other reaction(s): GI Upset    Atorvastatin      Other reaction(s): Muscle Aches    Erythromycin Base Hives    Macrolides And Ketolides     Troleandomycin     Verapamil      Other reaction(s):  Other (See Comments)  Low heart rate     Sulfa Antibiotics Rash     Current Outpatient Medications   Medication Sig Dispense Refill    glipiZIDE (GLUCOTROL) 5 MG tablet Take 0.5 tablets by mouth 2 times daily (before meals) 90 tablet 3    lisinopril (PRINIVIL;ZESTRIL) 20 MG tablet Take 1 tablet by mouth 2 times daily 180 tablet 3    dilTIAZem (TIAZAC) 240 MG extended release capsule Take 1 capsule by mouth 2 times daily 180 capsule 3    blood glucose monitor strips Test daily and as needed for symptoms of irregular blood glucose. Dispense sufficient amount for indicated testing frequency plus additional to accommodate PRN testing needs. 100 strip 3    blood glucose monitor strips Test 3 times a day & as needed for symptoms of irregular blood glucose. Dispense sufficient amount for indicated testing frequency plus additional to accommodate PRN testing needs. Which ever the insurance will cover. 100 strip 3    metFORMIN (GLUCOPHAGE-XR) 500 MG extended release tablet Take 2 tablets by mouth 2 times daily 360 tablet 3    pantoprazole (PROTONIX) 40 MG tablet Take 1 tablet by mouth every morning (before breakfast) 90 tablet 3    rosuvastatin (CRESTOR) 10 MG tablet Take 1 tablet by mouth nightly 90 tablet 3    Elastic Bandages & Supports (CARPAL TUNNEL WRIST DELUXE) MISC 1 each by Does not apply route nightly 1 each 0    aspirin EC 81 MG EC tablet Take 1 tablet by mouth daily 90 tablet 1    vitamin D3 (CHOLECALCIFEROL) 25 MCG (1000 UT) TABS tablet Take 1,000 Units by mouth daily      vitamin B-12 (CYANOCOBALAMIN) 1000 MCG tablet Take 1,000 mcg by mouth daily      Multiple Vitamins-Minerals (CENTRUM SILVER) TABS Take by mouth      nitroGLYCERIN (NITROSTAT) 0.4 MG SL tablet Place 1 tablet under the tongue every 5 minutes as needed for Chest pain 25 tablet 2     No current facility-administered medications for this visit. Review of Systems   Constitutional: Negative for chills, fatigue, fever and unexpected weight change. Eyes: Negative for visual disturbance. Respiratory: Negative for cough, shortness of breath and wheezing. Cardiovascular: Negative for chest pain, palpitations and leg swelling. Musculoskeletal:        Right knee pains  Right lateral upper leg pain  Right ankle pain   Skin: Negative for pallor and rash. Neurological: Negative for dizziness, weakness, light-headedness, numbness and headaches.      OBJECTIVE:  /70   Pulse 85   Temp 97.5 °F (36.4 °C)   Wt 143 lb 9.6 oz (65.1 kg)   SpO2 97% BMI 24.65 kg/m²    Physical Exam  Vitals reviewed. Constitutional:       General: She is not in acute distress. Appearance: She is well-developed. She is not diaphoretic. HENT:      Head: Normocephalic and atraumatic. Cardiovascular:      Rate and Rhythm: Normal rate and regular rhythm. Pulmonary:      Effort: Pulmonary effort is normal.      Breath sounds: Normal breath sounds. Musculoskeletal:      Comments: Limping with ambulation   Skin:     General: Skin is warm and dry. Coloration: Skin is not pale. Findings: No erythema or rash. Neurological:      Mental Status: She is alert and oriented to person, place, and time. Coordination: Coordination normal.   Psychiatric:         Mood and Affect: Mood normal.        ASSESSMENT/PLAN:  Nguyen Courtney was seen today for pain. Diagnoses and all orders for this visit:    Acute pain of right knee/Limping  -     Denies calf pain. No redness/swelling/heat. Timothy's sign negative  - Distal pulses intact bilaterally. - Pain reported with palpation of the medial superior aspect of the right kneecap. - Limping noted with palpation. Pain also reported intermittently on the location of the lateral left femur and right ankle. - Recommend Xray d/t injury. Pt agreeable. - Symptomatic management reviewed. - XR KNEE RIGHT (3 VIEWS); Future  -     XR FEMUR RIGHT (MIN 2 VIEWS); Future  -     XR ANKLE RIGHT (MIN 3 VIEWS); Future  - Patient will call symptoms worsen or fail to improve  - Red flag warning signs reviewed with patient and she will go to the ER if these occur    Return for as previously scheduled or sooner if needed. Pt informed to call if symptoms worsen or fail to improve. All questions answered. Patient states no further questions or concerns at this time.     Electronically signed by YAYA Gonzalez CNP 08/03/21

## 2021-08-04 DIAGNOSIS — M25.561 ACUTE PAIN OF RIGHT KNEE: Primary | ICD-10-CM

## 2021-08-04 DIAGNOSIS — R26.89 LIMPING: ICD-10-CM

## 2021-08-05 ENCOUNTER — TELEPHONE (OUTPATIENT)
Dept: INTERNAL MEDICINE CLINIC | Age: 71
End: 2021-08-05

## 2021-08-05 NOTE — TELEPHONE ENCOUNTER
It looks like she has mild arthritis in her knee and ankle.  I agree with Catie's recommendation of PT. saw

## 2021-08-12 ENCOUNTER — OFFICE VISIT (OUTPATIENT)
Dept: ORTHOPEDIC SURGERY | Age: 71
End: 2021-08-12
Payer: MEDICARE

## 2021-08-12 VITALS — BODY MASS INDEX: 23.9 KG/M2 | HEIGHT: 64 IN | WEIGHT: 140 LBS

## 2021-08-12 DIAGNOSIS — M25.561 RIGHT KNEE PAIN, UNSPECIFIED CHRONICITY: ICD-10-CM

## 2021-08-12 DIAGNOSIS — S76.311A HAMSTRING STRAIN, RIGHT, INITIAL ENCOUNTER: Primary | ICD-10-CM

## 2021-08-12 PROCEDURE — 1090F PRES/ABSN URINE INCON ASSESS: CPT | Performed by: ORTHOPAEDIC SURGERY

## 2021-08-12 PROCEDURE — G8427 DOCREV CUR MEDS BY ELIG CLIN: HCPCS | Performed by: ORTHOPAEDIC SURGERY

## 2021-08-12 PROCEDURE — 1123F ACP DISCUSS/DSCN MKR DOCD: CPT | Performed by: ORTHOPAEDIC SURGERY

## 2021-08-12 PROCEDURE — 1036F TOBACCO NON-USER: CPT | Performed by: ORTHOPAEDIC SURGERY

## 2021-08-12 PROCEDURE — 99203 OFFICE O/P NEW LOW 30 MIN: CPT | Performed by: ORTHOPAEDIC SURGERY

## 2021-08-12 PROCEDURE — G8399 PT W/DXA RESULTS DOCUMENT: HCPCS | Performed by: ORTHOPAEDIC SURGERY

## 2021-08-12 PROCEDURE — 3017F COLORECTAL CA SCREEN DOC REV: CPT | Performed by: ORTHOPAEDIC SURGERY

## 2021-08-12 PROCEDURE — 4040F PNEUMOC VAC/ADMIN/RCVD: CPT | Performed by: ORTHOPAEDIC SURGERY

## 2021-08-12 PROCEDURE — G8420 CALC BMI NORM PARAMETERS: HCPCS | Performed by: ORTHOPAEDIC SURGERY

## 2021-08-12 RX ORDER — ACETAMINOPHEN 500 MG
500 TABLET ORAL EVERY 6 HOURS PRN
COMMUNITY

## 2021-08-12 NOTE — PROGRESS NOTES
CHIEF COMPLAINT: Right knee pain. History:   Effie Cui is a 66 y.o. female referred by Sundar Romero NP for evaluation and treatment of right knee pain / injury. The patient complains of right knee pain. This is evaluated as a personal injury. The pain began 2 weeks ago. Rates pain 8/10. There was a history of injury. She twisted her knee stepping off of a raised flower bed. The pain is located posterior lateral. The knee has not given out or felt unstable. The patient can bend and straighten the knee fully. There was swelling in the knee. There was not catching / locking of the knee. The patient has not had PT. The patient has not had an injection. The patient has not taken NSAIDs. The patient has not tried ice. She does have a history of a left knee medial meniscus tear treated nonoperatively. She states this does not feel like how her left knee medial meniscus did. Outside reports reviewed: none.     Past Medical History:   Diagnosis Date    Allergic rhinitis     Fibromyalgia     GERD (gastroesophageal reflux disease)     Heart attack ([address]     Heart disease     Herniated disc     Hyperlipidemia     Hypertension     Type II or unspecified type diabetes mellitus without mention of complication, not stated as uncontrolled        Past Surgical History:   Procedure Laterality Date    CORONARY ANGIOPLASTY WITH STENT PLACEMENT  2018    HYMENECTOMY         Family History   Problem Relation Age of Onset    Heart Disease Mother     Cancer Father         ? lymphoma ( when patient was very young)   [facility] Diabetes Sister     Thyroid Disease Daughter        Social History     Socioeconomic History    Marital status:      Spouse name: None    Number of children: None    Years of education: None    Highest education level: None   Occupational History    None   Tobacco Use    Smoking status: Never Smoker    Smokeless tobacco: Never Used   Vaping Use    Vaping Use: Never used accommodate PRN testing needs. Which ever the insurance will cover. 100 strip 3    metFORMIN (GLUCOPHAGE-XR) 500 MG extended release tablet Take 2 tablets by mouth 2 times daily 360 tablet 3    pantoprazole (PROTONIX) 40 MG tablet Take 1 tablet by mouth every morning (before breakfast) 90 tablet 3    rosuvastatin (CRESTOR) 10 MG tablet Take 1 tablet by mouth nightly 90 tablet 3    Elastic Bandages & Supports (CARPAL TUNNEL WRIST DELUXE) MISC 1 each by Does not apply route nightly 1 each 0    aspirin EC 81 MG EC tablet Take 1 tablet by mouth daily 90 tablet 1    vitamin D3 (CHOLECALCIFEROL) 25 MCG (1000 UT) TABS tablet Take 1,000 Units by mouth daily      vitamin B-12 (CYANOCOBALAMIN) 1000 MCG tablet Take 1,000 mcg by mouth daily      Multiple Vitamins-Minerals (CENTRUM SILVER) TABS Take by mouth      nitroGLYCERIN (NITROSTAT) 0.4 MG SL tablet Place 1 tablet under the tongue every 5 minutes as needed for Chest pain 25 tablet 2     No current facility-administered medications for this visit. Allergies   Allergen Reactions    Asa Arthritis Strength-Antacid [Aspirin Buff, Al Hyd-Mg Hyd] Nausea Only     Per patient stopped taking because it upset her stomach. She denies itching, swelling, rash.  Aspirin      Other reaction(s): GI Upset    Atorvastatin      Other reaction(s): Muscle Aches    Erythromycin Base Hives    Macrolides And Ketolides     Troleandomycin     Verapamil      Other reaction(s): Other (See Comments)  Low heart rate     Sulfa Antibiotics Rash       Review of Systems:  I have reviewed the clinically relevant past medical history, medications, allergies, family history, social history, and 13 point Review of Systems from the patient's recent history form & documented any details relevant to today's presenting complaints in the history above.  The patient's self-reported past medical history, medications, allergies, family history, social history, and Review of Systems form from 8/12/21 have been scanned into the chart under the \"Media\" tab. Physical Examination:     Vital signs:   Ht 5' 4\" (1.626 m)   Wt 140 lb (63.5 kg)   BMI 24.03 kg/m²      General:  alert, appears stated age, cooperative and no distress   Gait:  Normal. The patient can bear weight on the injured extremity. Right Knee  Alignment:  neutral   ROM:  0 degrees extension to 120 degrees flexion   Bilateral knees   Crepitus:  no   Joint Tenderness:  moderate at lateral hamstrings. Mild medial joint line   Effusion:   0 cc   Patellar excursion:  2 of 4 quadrants    Patellar tilt test:  positive   Patellar facet tenderness:  negative medial   positive lateral   Anterior drawer test:  negative   Left knee: negative   Posterior drawer:   negative   Left knee: negative   Varus laxity at 30 degrees:  negative   Left knee: negative   Valgus laxity at 30 degrees:   negative   Left knee: negative   Dagmar's test:  negative   Left knee: negative     There is not any cellulitis, lymphedema or cutaneous lesions noted in the lower extremities. Motor exam of the lower extremities show quadriceps, hamstrings, foot dorsiflexion and plantarflexion grossly intact. Sensation to both feet is grossly intact to light touch. The bilateral lower extremities are warm and well-perfused with brisk capillary refill. Imaging:  Right Knee X-Ray: Bilateral sunrise and standing PA xrays of the knee were obtained and reviewed. Lateral view from outside 8/3/21 independently reviewed. No fracture or dislocation. Maintained joint spaces. Left knee with moderate to severe medial compartment narrowing. Assessment:     Right knee hamstring strain  Diabetes  Fibromyalgia  Hypertension  GERD  History of MI      Plan:     Natural history and expected course discussed. Questions answered. Ice/heat as needed. PT referral provided. She states she cannot take NSAIDs. Follow-up in 2 months as needed. Linda Hedrick.  Mustapha Tompkins, MD  Orthopaedic Surgery and Sports Medicine     Disclaimer: This note was generated with use of a verbal recognition program (DRAGON) and an attempt was made to check for errors. It is possible that there are still dictated errors within this office note. If so, please bring any significant errors to my attention for an addendum. All efforts were made to ensure that this office note is accurate.

## 2021-09-29 ENCOUNTER — TELEPHONE (OUTPATIENT)
Dept: INTERNAL MEDICINE CLINIC | Age: 71
End: 2021-09-29

## 2021-09-29 NOTE — TELEPHONE ENCOUNTER
----- Message from Isela Deleon sent at 9/29/2021 10:33 AM EDT -----  Subject: Message to Provider    QUESTIONS  Information for Provider? patient is having some right knee and leg pain,   and she has been taking tylenol, and it hasn't really been helping the   pain, and she can not take Ibuprofen, patient is wanting to know if   Jan Patel can call her in something that can take down the inflammation and   help with the pain. She uses [facility] on [facility], in [facility].  ---------------------------------------------------------------------------  --------------  [address]  What is the best way for the office to contact you? OK to leave message on   voicemail,Do not leave any message, patient will call back for answer  Preferred Call Back Phone Number? [phone]  ---------------------------------------------------------------------------  --------------  SCRIPT ANSWERS  Relationship to Patient?  Self

## 2021-10-12 NOTE — PROGRESS NOTES
[address]   Cardiac f/up    Referring Provider:  Charles Polanco DO     Chief Complaint   Patient presents with    Coronary Artery Disease     No complaints     Hypertension    6 Month Follow-Up    CAD and chest pain  History of Present Illness:  Mrs. Davion Sharma is s/p acute posterior wall MI April 2018 (progressive exertional chest discomfort). LHC showed 100% OM1 which was stented. She is on Crestor for marked hyperlipidemia, has family history of premature heart disease. Intolerant to beta blockers. She feels she had COVID 1/2021. At our last visit, she reported elevated heart rates and blood pressures at home.     Today, she reports she continues to feel back pain , and occasional palpitations. Denies exertional chest pain, NULL/PND, light-headedness, edema. She is to have MRI of her knee. Plan for stress testing next visit. Past Medical History:   has a past medical history of Allergic rhinitis, Fibromyalgia, GERD (gastroesophageal reflux disease), Heart attack ([address] Heart disease, Herniated disc, Hyperlipidemia, Hypertension, and Type II or unspecified type diabetes mellitus without mention of complication, not stated as uncontrolled. Surgical History:   has a past surgical history that includes Coronary angioplasty with stent (2018) and Hymenectomy. Social History:   reports that she has never smoked. She has never used smokeless tobacco. She reports that she does not drink alcohol and does not use drugs. Family History:  family history includes Cancer in her father; Diabetes in her sister; Heart Disease in her mother; Thyroid Disease in her daughter. Home Medications:  Prior to Admission medications    Medication Sig Start Date End Date Taking?  Authorizing Provider   metFORMIN (GLUCOPHAGE-XR) 500 MG extended release tablet Take 2 tablets by mouth 2 times daily 10/20/21  Yes Anna Ritter DO   lisinopril (PRINIVIL;ZESTRIL) 20 MG tablet Take 1 tablet by mouth 2 times daily 10/20/21  Yes Shelley Brunner, DO   dilTIAZem [facility]) 240 MG extended release capsule Take 1 capsule by mouth 2 times daily  Patient taking differently: Take 240 mg by mouth 2 times daily Patient stated that she taking 1/2 in the morning and 1/2 in the evening to = 1 tablet because it was dropping her sugar 10/20/21  Yes Shelley Brunner, DO   acetaminophen (TYLENOL) 500 MG tablet Take 500 mg by mouth every 6 hours as needed for Pain   Yes Historical Provider, MD   glipiZIDE (GLUCOTROL) 5 MG tablet Take 0.5 tablets by mouth 2 times daily (before meals) 6/21/21  Yes Shelley Brunner, DO   blood glucose monitor strips Test daily and as needed for symptoms of irregular blood glucose. Dispense sufficient amount for indicated testing frequency plus additional to accommodate PRN testing needs. 2/22/21  Yes Shelley Brunner, DO   blood glucose monitor strips Test 3 times a day & as needed for symptoms of irregular blood glucose. Dispense sufficient amount for indicated testing frequency plus additional to accommodate PRN testing needs. Which ever the insurance will cover.  2/12/21  Yes Shelley Brunner, DO   rosuvastatin (CRESTOR) 10 MG tablet Take 1 tablet by mouth nightly 2/4/21  Yes Shelley Brunner, DO   Elastic Bandages & Supports (CARPAL TUNNEL WRIST DELUXE) [address] [facility] [address] 1 each by Does not apply route nightly 11/9/20  Yes Shelley Brunner, DO   aspirin EC 81 MG EC tablet Take 1 tablet by mouth daily 9/11/20  Yes Gabriela Cabral MD   vitamin D3 (CHOLECALCIFEROL) 25 MCG (1000 UT) TABS tablet Take 1,000 Units by mouth daily   Yes Historical Provider, MD   vitamin B-12 (CYANOCOBALAMIN) 1000 MCG tablet Take 1,000 mcg by mouth daily 1/15/20  Yes Historical Provider, MD   Multiple Vitamins-Minerals (CENTRUM SILVER) TABS Take by mouth   Yes Historical Provider, MD   nitroGLYCERIN (NITROSTAT) 0.4 MG SL tablet Place 1 tablet under the tongue every 5 minutes as needed for Chest pain 8/16/19  Yes Gabriela Cabral MD hydrALAZINE (APRESOLINE) 10 MG tablet Take 1 tablet by mouth 2 times daily 10/20/21 10/20/22  Malathi Salazar, DO      Allergies:  Edmonia Hose arthritis strength-antacid [aspirin buff, al hyd-mg hyd]; Aspirin; Atorvastatin; Erythromycin base; Macrolides and ketolides; Troleandomycin; Verapamil; and Sulfa antibiotics     Review of Systems:   · Constitutional: there has been no unanticipated weight loss. There's been no change in energy level, sleep pattern, or activity level. · Eyes: No visual changes or diplopia. No scleral icterus. · ENT: No Headaches, hearing loss or vertigo. No mouth sores or sore throat. · Cardiovascular: Reviewed in HPI  · Respiratory: No cough or wheezing, no sputum production. No hematemesis. · Gastrointestinal: No abdominal pain, appetite loss, blood in stools. No change in bowel or bladder habits. · Genitourinary: No dysuria, trouble voiding, or hematuria. · Musculoskeletal:  No gait disturbance, weakness or joint complaints. +Back pain  · Integumentary: No rash or pruritis. · Neurological: No headache, diplopia, change in muscle strength, numbness or tingling. No change in gait, balance, coordination, mood, affect, memory, mentation, behavior. · Psychiatric: No anxiety, no depression. · Endocrine: No malaise, fatigue or temperature intolerance. No excessive thirst, fluid intake, or urination. No tremor. · Hematologic/Lymphatic: No abnormal bruising or bleeding, blood clots or swollen lymph nodes. · Allergic/Immunologic: No nasal congestion or hives.     Physical Examination:    Vitals:    11/05/21 1420   BP: 122/70   Pulse: 84   SpO2: 97%        Constitutional and General Appearance: Appears stated age, NAD   Skin:good turgor,intact without lesions  HEENT: EOMI ,normal  Neck:no JVD    Respiratory:  · Normal excursion and expansion without use of accessory muscles  · Resp Auscultation: Normal breath sounds without dullness  Cardiovascular:  · The apical impulses not displaced  · Heart tones are crisp and normal  · Cervical veins are not engorged  · The carotid upstroke is normal in amplitude and contour without delay or bruit  · Peripheral pulses are symmetrical and full  · There is no clubbing, cyanosis of the extremities. · No edema  · Femoral Arteries: 2+ and equal  · Pedal Pulses: 2+ and equal   Abdomen:  · No masses or tenderness  · Liver/Spleen: No Abnormalities Noted  Neurological/Psychiatric:  · Alert and oriented in all spheres  · Moves all extremities well  · Exhibits normal gait balance and coordination  · No abnormalities of mood, affect, memory, mentation, or behavior are noted    Angiogram 4/20/18  Acute inferior posterior wall MI  Cath with 40-50% mid RCA,100% OM1,EF 50% with mild inferior wall hypokinesis. Trop 2.99.     PCI with 2.5 x 15 balloon then 2.5 x 18 manasa SHARIF stent to 16 hitesh with excellent angiographic result. Small distal branch with embolization. Will continue aaggrastat x 12 hours. Plavix given. She has been on crestor for lipid management,need to give in house  ECG-8/16/19 for chest pain  with borderline short DE interval otherwise normal-done for chest pain    Nuclear stress 3/4/19      Summary     Small sized lateral fixed defect of mild intensity consistent with     infarction in the territory of the mid and distal LCx .     Normal LV size and systolic function.               Assessment:     CAD (posterior wall MI April 2018)  [facility] 4/20/18> SHARIF to [facility] 27  Family history of premature heart disease. Stable, no anginal symptoms. Had GI symptoms (upper abd pain) that radiated to neck and back with prior stenting  ASA     Hypertension  Controlled   Blood pressure 122/70, pulse 84, height 5' 4\" (1.626 m), weight 140 lb (63.5 kg), SpO2 97 %.     Hyperlipidemia  Takes Crestor 10mg  8/4/20> , , HDL 47, LDL 66, ALT 18 AST 18   6/14/21> , , HDL 48, LDL 51, ALT 14 AST 15       Carotid dopplers 6/14/18 (Care Everywhere)> less than 50% mary  Abd u/s 6/3/17> Negative for aneurysm    Palpitations -risk for accessory pathway conduction  MCOT 9/24/19> SR/PAT - controlled   Intolerant to beta blockers  On Diltiazem 240 mg BID      Plan:  Marian Wright has a stable cardiac status. Cardiac test and lab results personally reviewed by me during this office visit and discussed. No medication changes   Ok for knee MRI- no effect on stents   Continue other medications  Follow up in 6 months with stress echo. I appreciate the opportunity of cooperating in the care of this individual.    Jonah Elaine. Andrei Arce M.D., [facility]    Patient's problem list, medications, allergies, past medical, surgical, social and family histories were reviewed and updated as appropriate. Scribe's attestation: This note was scribed in the presence of Dr. Andrei Arce MD, by Tracie Ozuna RN. The scribe's documentation has been prepared under my direction and personally reviewed by me in its entirety. I confirm that the note above accurately reflects all work, treatment, procedures, and medical decision making performed by me.

## 2021-10-15 ENCOUNTER — TELEPHONE (OUTPATIENT)
Dept: CARDIOLOGY CLINIC | Age: 71
End: 2021-10-15

## 2021-10-15 NOTE — TELEPHONE ENCOUNTER
PCI with 2.5 x 15 balloon then 2.5 x 18 manasa SHARIF stent to 16 hitesh with excellent angiographic result. Small distal branch with embolization. Will continue aaggrastat x 12 hours. Plavix given.  She has been on crestor for lipid management,need to give in house    Pt had Medtronic Dracut 2.5 x 18 placed

## 2021-10-15 NOTE — TELEPHONE ENCOUNTER
She needs to get MRI on her right knee soon , she is in a lot of pain .   She has the card with her info on her stents but wants to kbow from LES that it is safe to have an MRI with her stents

## 2021-10-20 ENCOUNTER — TELEPHONE (OUTPATIENT)
Dept: INTERNAL MEDICINE CLINIC | Age: 71
End: 2021-10-20

## 2021-10-20 ENCOUNTER — OFFICE VISIT (OUTPATIENT)
Dept: INTERNAL MEDICINE CLINIC | Age: 71
End: 2021-10-20
Payer: MEDICARE

## 2021-10-20 VITALS
HEART RATE: 84 BPM | SYSTOLIC BLOOD PRESSURE: 148 MMHG | OXYGEN SATURATION: 99 % | DIASTOLIC BLOOD PRESSURE: 70 MMHG | BODY MASS INDEX: 24.17 KG/M2 | WEIGHT: 140.8 LBS

## 2021-10-20 DIAGNOSIS — E11.9 TYPE 2 DIABETES MELLITUS WITHOUT COMPLICATION, WITHOUT LONG-TERM CURRENT USE OF INSULIN (HCC): ICD-10-CM

## 2021-10-20 DIAGNOSIS — M35.00 SICCA, UNSPECIFIED TYPE (HCC): ICD-10-CM

## 2021-10-20 DIAGNOSIS — I10 PRIMARY HYPERTENSION: ICD-10-CM

## 2021-10-20 DIAGNOSIS — Z71.85 VACCINE COUNSELING: ICD-10-CM

## 2021-10-20 DIAGNOSIS — M25.561 ACUTE PAIN OF RIGHT KNEE: ICD-10-CM

## 2021-10-20 DIAGNOSIS — E11.9 TYPE 2 DIABETES MELLITUS WITHOUT COMPLICATION, WITHOUT LONG-TERM CURRENT USE OF INSULIN (HCC): Primary | ICD-10-CM

## 2021-10-20 LAB
A/G RATIO: 1.8 (ref 1.1–2.2)
ALBUMIN SERPL-MCNC: 4.8 G/DL (ref 3.4–5)
ALP BLD-CCNC: 85 U/L (ref 40–129)
ALT SERPL-CCNC: 15 U/L (ref 10–40)
ANION GAP SERPL CALCULATED.3IONS-SCNC: 18 MMOL/L (ref 3–16)
AST SERPL-CCNC: 14 U/L (ref 15–37)
BILIRUB SERPL-MCNC: <0.2 MG/DL (ref 0–1)
BUN BLDV-MCNC: 17 MG/DL (ref 7–20)
CALCIUM SERPL-MCNC: 10.3 MG/DL (ref 8.3–10.6)
CHLORIDE BLD-SCNC: 97 MMOL/L (ref 99–110)
CO2: 24 MMOL/L (ref 21–32)
CREAT SERPL-MCNC: 0.5 MG/DL (ref 0.6–1.2)
GFR AFRICAN AMERICAN: >60
GFR NON-AFRICAN AMERICAN: >60
GLOBULIN: 2.7 G/DL
GLUCOSE BLD-MCNC: 84 MG/DL (ref 70–99)
POTASSIUM SERPL-SCNC: 4.2 MMOL/L (ref 3.5–5.1)
SODIUM BLD-SCNC: 139 MMOL/L (ref 136–145)
TOTAL PROTEIN: 7.5 G/DL (ref 6.4–8.2)

## 2021-10-20 PROCEDURE — 1123F ACP DISCUSS/DSCN MKR DOCD: CPT | Performed by: INTERNAL MEDICINE

## 2021-10-20 PROCEDURE — 3017F COLORECTAL CA SCREEN DOC REV: CPT | Performed by: INTERNAL MEDICINE

## 2021-10-20 PROCEDURE — 2022F DILAT RTA XM EVC RTNOPTHY: CPT | Performed by: INTERNAL MEDICINE

## 2021-10-20 PROCEDURE — G8484 FLU IMMUNIZE NO ADMIN: HCPCS | Performed by: INTERNAL MEDICINE

## 2021-10-20 PROCEDURE — G8427 DOCREV CUR MEDS BY ELIG CLIN: HCPCS | Performed by: INTERNAL MEDICINE

## 2021-10-20 PROCEDURE — 3051F HG A1C>EQUAL 7.0%<8.0%: CPT | Performed by: INTERNAL MEDICINE

## 2021-10-20 PROCEDURE — G8399 PT W/DXA RESULTS DOCUMENT: HCPCS | Performed by: INTERNAL MEDICINE

## 2021-10-20 PROCEDURE — 1036F TOBACCO NON-USER: CPT | Performed by: INTERNAL MEDICINE

## 2021-10-20 PROCEDURE — 1090F PRES/ABSN URINE INCON ASSESS: CPT | Performed by: INTERNAL MEDICINE

## 2021-10-20 PROCEDURE — G8420 CALC BMI NORM PARAMETERS: HCPCS | Performed by: INTERNAL MEDICINE

## 2021-10-20 PROCEDURE — 4040F PNEUMOC VAC/ADMIN/RCVD: CPT | Performed by: INTERNAL MEDICINE

## 2021-10-20 PROCEDURE — 99214 OFFICE O/P EST MOD 30 MIN: CPT | Performed by: INTERNAL MEDICINE

## 2021-10-20 RX ORDER — LISINOPRIL 20 MG/1
20 TABLET ORAL 2 TIMES DAILY
Qty: 180 TABLET | Refills: 3 | Status: SHIPPED | OUTPATIENT
Start: 2021-10-20 | End: 2022-04-12 | Stop reason: SDUPTHER

## 2021-10-20 RX ORDER — METFORMIN HYDROCHLORIDE 500 MG/1
1000 TABLET, EXTENDED RELEASE ORAL 2 TIMES DAILY
Qty: 360 TABLET | Refills: 3 | Status: SHIPPED | OUTPATIENT
Start: 2021-10-20 | End: 2022-04-12 | Stop reason: SDUPTHER

## 2021-10-20 RX ORDER — HYDROCODONE BITARTRATE AND ACETAMINOPHEN 5; 325 MG/1; MG/1
.5-1 TABLET ORAL EVERY 8 HOURS PRN
Qty: 30 TABLET | Refills: 0 | Status: SHIPPED | OUTPATIENT
Start: 2021-10-20 | End: 2021-11-05 | Stop reason: ALTCHOICE

## 2021-10-20 RX ORDER — DILTIAZEM HYDROCHLORIDE 240 MG/1
240 CAPSULE, EXTENDED RELEASE ORAL 2 TIMES DAILY
Qty: 180 CAPSULE | Refills: 3 | Status: SHIPPED | OUTPATIENT
Start: 2021-10-20 | End: 2022-04-12 | Stop reason: SDUPTHER

## 2021-10-20 RX ORDER — HYDRALAZINE HYDROCHLORIDE 10 MG/1
10 TABLET, FILM COATED ORAL 2 TIMES DAILY
Qty: 60 TABLET | Refills: 3 | Status: SHIPPED
Start: 2021-10-20 | End: 2021-12-07

## 2021-10-20 NOTE — PROGRESS NOTES
Patient: Rosita Shook is a 66 y.o. female who presents today with the following Chief Complaint(s):  No chief complaint on file. HPI     Here today for follow up. Is due for labs today. 1. C/o right knee pain- twisted it a few weeks ago. Did see Dr. Romana Lively who referred her to PT. Knee will swell periodically. Pain will not allow her to sleep. Pain radiates down her knee into her shin and foot. Having burning sensation in the muscle. Did not go to PT as she first wanted an answer so she went to [facility] for a second opinion. Was offered a steroid injection (had injection within the past month but not sure when, did help a little) which she took and her sugar raised to over 300. After steroid injection, recommended SynVisc injections which have been approved. Asked to have an MRI done which has been ordered. Went to have MRI done at [facility] and did not follow through with MRI d/t claustrophobia and MRI was a closed MRI and wanted to have an open MRI. Was told that she could not have an open MRI d/t stent in her heart but Dr. Jenna Sheikh told her it was ok. Declines medication to help with MRI. Does not have a  to take her to the MRI. Has pain and swelling in posterior knee, gets worse with prolonged use. Is worried that she is having burning in her leg. Is now starting to have burning in her left leg. Denies back pain. Tylenol does curb her pain. Diclofenac cream has not helped. Has also tried OTC BenGay, Aspercreme. Cannot take ibuprofen d/t CAD. 2. DM- had elevated blood sugars up to 300 following steroid injection of knee. Continues to take metformin ER 1,000 mg BID. Night time sugars have been in the 120's, morning sugars have been typically in the 120's as well. Has had 1 episode of low blood sugar. After meal sugars have been as high as 200's. Not checking 2 hours after eating. Pre-meal sugars have been \"sometimes higher in the 170's-180's and sometimes lower in the 120's\".      3. HTN- doesn't typically check her blood pressure as she doesn't typically have issues with her blood pressure. Is taking lisinopril 20 mg BID and diltiazem  mg BID. Declines flu, COVID, pneumonia vaccines. C/o feeling like her mouth is super dry, feels thirsty all of the time. Also notices that her eyes are dry and itchy.      Wt Readings from Last 3 Encounters:   10/20/21 140 lb 12.8 oz (63.9 kg)   08/12/21 140 lb (63.5 kg)   08/03/21 143 lb 9.6 oz (65.1 kg)     Temp Readings from Last 3 Encounters:   08/03/21 97.5 °F (36.4 °C)   04/16/21 97.7 °F (36.5 °C)   03/31/21 97.3 °F (36.3 °C) (Temporal)     BP Readings from Last 3 Encounters:   10/20/21 (!) 148/70   08/03/21 130/70   06/21/21 (!) 136/58     Pulse Readings from Last 3 Encounters:   10/20/21 84   08/03/21 85   06/21/21 88         Results for orders placed or performed in visit on 06/14/21   TSH with Reflex   Result Value Ref Range    TSH 1.24 0.27 - 4.20 uIU/mL   Microalbumin / Creatinine Urine Ratio   Result Value Ref Range    Microalbumin, Random Urine <1.20 <2.0 mg/dL    Creatinine, Ur 62.0 28.0 - 259.0 mg/dL    Microalbumin Creatinine Ratio see below 0.0 - 30.0 mg/g   Lipid Panel   Result Value Ref Range    Cholesterol, Total 143 0 - 199 mg/dL    Triglycerides 220 (H) 0 - 150 mg/dL    HDL 48 40 - 60 mg/dL    LDL Calculated 51 <100 mg/dL    VLDL Cholesterol Calculated 44 Not Established mg/dL   Hemoglobin A1C   Result Value Ref Range    Hemoglobin A1C 7.0 See comment %    eAG 154.2 mg/dL   Comprehensive Metabolic Panel   Result Value Ref Range    Sodium 137 136 - 145 mmol/L    Potassium 4.5 3.5 - 5.1 mmol/L    Chloride 99 99 - 110 mmol/L    CO2 24 21 - 32 mmol/L    Anion Gap 14 3 - 16    Glucose 150 (H) 70 - 99 mg/dL    BUN 15 7 - 20 mg/dL    CREATININE 0.6 0.6 - 1.2 mg/dL    GFR Non-African American >60 >60    GFR African American >60 >60    Calcium 9.6 8.3 - 10.6 mg/dL    Total Protein 7.0 6.4 - 8.2 g/dL    Albumin 4.8 3.4 - 5.0 g/dL Albumin/Globulin Ratio 2.2 1.1 - 2.2    Total Bilirubin 0.3 0.0 - 1.0 mg/dL    Alkaline Phosphatase 68 40 - 129 U/L    ALT 14 10 - 40 U/L    AST 15 15 - 37 U/L    Globulin 2.2 g/dL        Allergies   Allergen Reactions    Asa Arthritis Strength-Antacid [Aspirin Buff, Al Hyd-Mg Hyd] Nausea Only     Per patient stopped taking because it upset her stomach. She denies itching, swelling, rash.  Aspirin      Other reaction(s): GI Upset    Atorvastatin      Other reaction(s): Muscle Aches    Erythromycin Base Hives    Macrolides And Ketolides     Troleandomycin     Verapamil      Other reaction(s):  Other (See Comments)  Low heart rate     Sulfa Antibiotics Rash      Past Medical History:   Diagnosis Date    Allergic rhinitis     Fibromyalgia     GERD (gastroesophageal reflux disease)     Heart attack ([facility] [address]     Heart disease     Herniated disc     Hyperlipidemia     Hypertension     Type II or unspecified type diabetes mellitus without mention of complication, not stated as uncontrolled       Past Surgical History:   Procedure Laterality Date    CORONARY ANGIOPLASTY WITH STENT PLACEMENT  2018    HYMENECTOMY        Social History     Socioeconomic History    Marital status:      Spouse name: Not on file    Number of children: Not on file    Years of education: Not on file    Highest education level: Not on file   Occupational History    Not on file   Tobacco Use    Smoking status: Never Smoker    Smokeless tobacco: Never Used   Vaping Use    Vaping Use: Never used   Substance and Sexual Activity    Alcohol use: No    Drug use: No    Sexual activity: Not on file     Comment:    Other Topics Concern    Not on file   Social History Narrative    Not on file     Social Determinants of Health     Financial Resource Strain:     Difficulty of Paying Living Expenses:    Food Insecurity:     Worried About Running Out of Food in the Last Year:     Yoko of Food in the Last Year: Transportation Needs:     Lack of Transportation (Medical):  Lack of Transportation (Non-Medical):    Physical Activity:     Days of Exercise per Week:     Minutes of Exercise per Session:    Stress:     Feeling of Stress :    Social Connections:     Frequency of Communication with Friends and Family:     Frequency of Social Gatherings with Friends and Family:     Attends Taoism Services:     Active Member of Clubs or Organizations:     Attends Club or Organization Meetings:     Marital Status:    Intimate Partner Violence:     Fear of Current or Ex-Partner:     Emotionally Abused:     Physically Abused:     Sexually Abused:      Family History   Problem Relation Age of Onset    Heart Disease Mother     Cancer Father         ? lymphoma ( when patient was very young)    Diabetes Sister     Thyroid Disease Daughter         Outpatient Medications Prior to Visit   Medication Sig Dispense Refill    diclofenac sodium (VOLTAREN) 1 % GEL Apply 4 g topically 4 times daily as needed for Pain 100 g 1    acetaminophen (TYLENOL) 500 MG tablet Take 500 mg by mouth every 6 hours as needed for Pain      glipiZIDE (GLUCOTROL) 5 MG tablet Take 0.5 tablets by mouth 2 times daily (before meals) 90 tablet 3    blood glucose monitor strips Test daily and as needed for symptoms of irregular blood glucose. Dispense sufficient amount for indicated testing frequency plus additional to accommodate PRN testing needs. 100 strip 3    blood glucose monitor strips Test 3 times a day & as needed for symptoms of irregular blood glucose. Dispense sufficient amount for indicated testing frequency plus additional to accommodate PRN testing needs. Which ever the insurance will cover.  100 strip 3    rosuvastatin (CRESTOR) 10 MG tablet Take 1 tablet by mouth nightly 90 tablet 3    Elastic Bandages & Supports (CARPAL TUNNEL WRIST DELUXE) MISC 1 each by Does not apply route nightly 1 each 0    aspirin EC 81 MG EC tablet Take 1 tablet by mouth daily 90 tablet 1    vitamin D3 (CHOLECALCIFEROL) 25 MCG (1000 UT) TABS tablet Take 1,000 Units by mouth daily      vitamin B-12 (CYANOCOBALAMIN) 1000 MCG tablet Take 1,000 mcg by mouth daily      Multiple Vitamins-Minerals (CENTRUM SILVER) TABS Take by mouth      nitroGLYCERIN (NITROSTAT) 0.4 MG SL tablet Place 1 tablet under the tongue every 5 minutes as needed for Chest pain 25 tablet 2    lisinopril (PRINIVIL;ZESTRIL) 20 MG tablet Take 1 tablet by mouth 2 times daily 180 tablet 3    dilTIAZem (TIAZAC) 240 MG extended release capsule Take 1 capsule by mouth 2 times daily 180 capsule 3    metFORMIN (GLUCOPHAGE-XR) 500 MG extended release tablet Take 2 tablets by mouth 2 times daily 360 tablet 3    pantoprazole (PROTONIX) 40 MG tablet Take 1 tablet by mouth every morning (before breakfast) 90 tablet 3     No facility-administered medications prior to visit. Patient'spast medical history, surgical history, family history, medications,  and allergies  were all reviewed and updated as appropriate today. Review of Systems   Constitutional: Negative for appetite change, fatigue, fever and unexpected weight change. Eyes: Negative for visual disturbance. Respiratory: Negative for chest tightness and shortness of breath. Cardiovascular: Negative for chest pain. Gastrointestinal: Negative for abdominal pain, constipation and diarrhea. Endocrine: Negative for cold intolerance, heat intolerance, polydipsia, polyphagia and polyuria. No low blood sugars   Skin: Negative for rash. BP (!) 148/70 (Site: Left Upper Arm, Position: Sitting, Cuff Size: Medium Adult)   Pulse 84   Wt 140 lb 12.8 oz (63.9 kg)   SpO2 99%   BMI 24.17 kg/m²   Physical Exam  Vitals and nursing note reviewed. Constitutional:       Appearance: She is well-developed. She is not toxic-appearing. HENT:      Head: Normocephalic.       Right Ear: Tympanic membrane, ear canal and external ear normal.      Left Ear: Tympanic membrane, ear canal and external ear normal.      Mouth/Throat:      Pharynx: No oropharyngeal exudate or posterior oropharyngeal erythema. Eyes:      General: No scleral icterus. Extraocular Movements: Extraocular movements intact. Conjunctiva/sclera: Conjunctivae normal.      Pupils: Pupils are equal, round, and reactive to light. Neck:      Thyroid: No thyroid mass or thyromegaly. Vascular: No carotid bruit. Cardiovascular:      Rate and Rhythm: Normal rate and regular rhythm. Heart sounds: Normal heart sounds. No murmur heard. Pulmonary:      Effort: Pulmonary effort is normal.      Breath sounds: Normal breath sounds. Musculoskeletal:      Right lower leg: No edema. Left lower leg: No edema. Lymphadenopathy:      Cervical: No cervical adenopathy. Neurological:      General: No focal deficit present. Mental Status: She is alert and oriented to person, place, and time. Psychiatric:         Mood and Affect: Mood normal.         Behavior: Behavior normal. Behavior is cooperative. ASSESSMENT/PLAN:    Problem List Items Addressed This Visit     Acute pain of right knee     Following with [facility] as she wanted to undergo an MRI before doing physical therapy. Has not responded to steroid injection as well as she would have liked. She does not want to do physical therapy until she has a definitive diagnosis. Add Norco 5/325 mg 1/2 to 1 pill at at bedtime to see if that helps with her pain and improves her sleep. Relevant Medications    HYDROcodone-acetaminophen (NORCO) 5-325 MG per tablet    HTN (hypertension)     Above goal.  Resume hydralazine 10 mg twice daily. Continue lisinopril 20 mg twice daily and diltiazem 240 mg twice daily. [place] ([facility] [address]      Rule out Sjogren's syndrome.          Relevant Orders    Anti SSA (Completed)    Anti SSB (Completed)    Type 2 diabetes mellitus without complication, without long-term current use of insulin ([facility] [address] - Primary     Under reasonable control with hemoglobin A1c of 7%. Continue glipizide 2.5 mg twice daily and Metformin ER 1000 mg twice daily. Relevant Medications    metFORMIN (GLUCOPHAGE-XR) 500 MG extended release tablet    Other Relevant Orders    HEMOGLOBIN A1C (Completed)    COMPREHENSIVE METABOLIC PANEL (Completed)    Vaccine counseling     Encouraged [id number] vaccination. Discussed importance of COVID-19 vaccination in terms of decreasing morbidity and preventing mortality. Discussed importance of COVID-19 vaccination in terms of controlling COVID-19 pandemic. Discussed safety of COVID-19 vaccines as well as potential side effects. Discussed scheduling COVID-19 vaccine through patient's pharmacy. Discussed increased risk of spread and severity of illness with delta variant. Discussed importance of continued masking, good handwashing, and social distancing, particularly if unvaccinated. Declines flu vaccine. Reviewed that flu vaccine is a dead vaccine, that it will not give her the flu. Reviewed s/s of immune reaction (low-grade temp, headache, mild arthralgias lasting a few days following vaccine) is different than influenza. Reviewed potential significant morbidity and mortality associated with influenza. Continues to decline flu vaccine. Patient declines pneumonia vaccines. Reviewed increased risk of sepsis with pneumococcal pneumonia. Current Outpatient Medications   Medication Sig Dispense Refill    HYDROcodone-acetaminophen (NORCO) 5-325 MG per tablet Take 0.5-1 tablets by mouth every 8 hours as needed for Pain for up to 30 days. Intended supply: 7 days.  Take lowest dose possible to manage pain 30 tablet 0    hydrALAZINE (APRESOLINE) 10 MG tablet Take 1 tablet by mouth 2 times daily 60 tablet 3    metFORMIN (GLUCOPHAGE-XR) 500 MG extended release tablet Take 2 tablets by mouth 2 times daily 360 tablet 3    lisinopril (PRINIVIL;ZESTRIL) 20 MG tablet Take 1 tablet by mouth 2 times daily 180 tablet 3    dilTIAZem (TIAZAC) 240 MG extended release capsule Take 1 capsule by mouth 2 times daily 180 capsule 3    diclofenac sodium (VOLTAREN) 1 % GEL Apply 4 g topically 4 times daily as needed for Pain 100 g 1    acetaminophen (TYLENOL) 500 MG tablet Take 500 mg by mouth every 6 hours as needed for Pain      glipiZIDE (GLUCOTROL) 5 MG tablet Take 0.5 tablets by mouth 2 times daily (before meals) 90 tablet 3    blood glucose monitor strips Test daily and as needed for symptoms of irregular blood glucose. Dispense sufficient amount for indicated testing frequency plus additional to accommodate PRN testing needs. 100 strip 3    blood glucose monitor strips Test 3 times a day & as needed for symptoms of irregular blood glucose. Dispense sufficient amount for indicated testing frequency plus additional to accommodate PRN testing needs. Which ever the insurance will cover. 100 strip 3    rosuvastatin (CRESTOR) 10 MG tablet Take 1 tablet by mouth nightly 90 tablet 3    Elastic Bandages & Supports (CARPAL TUNNEL WRIST DELUXE) MISC 1 each by Does not apply route nightly 1 each 0    aspirin EC 81 MG EC tablet Take 1 tablet by mouth daily 90 tablet 1    vitamin D3 (CHOLECALCIFEROL) 25 MCG (1000 UT) TABS tablet Take 1,000 Units by mouth daily      vitamin B-12 (CYANOCOBALAMIN) 1000 MCG tablet Take 1,000 mcg by mouth daily      Multiple Vitamins-Minerals (CENTRUM SILVER) TABS Take by mouth      nitroGLYCERIN (NITROSTAT) 0.4 MG SL tablet Place 1 tablet under the tongue every 5 minutes as needed for Chest pain 25 tablet 2     No current facility-administered medications for this visit. Return in about 2 weeks (around 11/3/2021) for HTN.

## 2021-10-20 NOTE — TELEPHONE ENCOUNTER
I only gave her 30 to try to see if they will help her. I asked her to only take at HS. I am aware that it is only a 7 day supply if she takes it TID.

## 2021-10-20 NOTE — TELEPHONE ENCOUNTER
Pharmacy called to clarify directions on prescription. Her insurance will only pay for 7 days supply of hydrocodone. Please call to clarify.

## 2021-10-21 LAB
ANTI-SS-A IGG: <0.2 AI (ref 0–0.9)
ANTI-SS-B IGG: <0.2 AI (ref 0–0.9)
ESTIMATED AVERAGE GLUCOSE: 154.2 MG/DL
HBA1C MFR BLD: 7 %

## 2021-10-23 PROBLEM — M35.00 SICCA (HCC): Status: ACTIVE | Noted: 2021-10-23

## 2021-10-23 PROBLEM — M25.561 ACUTE PAIN OF RIGHT KNEE: Status: ACTIVE | Noted: 2021-10-23

## 2021-10-23 ASSESSMENT — ENCOUNTER SYMPTOMS
SHORTNESS OF BREATH: 0
ABDOMINAL PAIN: 0
DIARRHEA: 0
CHEST TIGHTNESS: 0
CONSTIPATION: 0

## 2021-10-24 NOTE — ASSESSMENT & PLAN NOTE
Encouraged COVID-19 vaccination. Discussed importance of COVID-19 vaccination in terms of decreasing morbidity and preventing mortality. Discussed importance of COVID-19 vaccination in terms of controlling COVID-19 pandemic. Discussed safety of COVID-19 vaccines as well as potential side effects. Discussed scheduling COVID-19 vaccine through patient's pharmacy. Discussed increased risk of spread and severity of illness with delta variant. Discussed importance of continued masking, good handwashing, and social distancing, particularly if unvaccinated. Declines flu vaccine. Reviewed that flu vaccine is a dead vaccine, that it will not give her the flu. Reviewed s/s of immune reaction (low-grade temp, headache, mild arthralgias lasting a few days following vaccine) is different than influenza. Reviewed potential significant morbidity and mortality associated with influenza. Continues to decline flu vaccine. Patient declines pneumonia vaccines. Reviewed increased risk of sepsis with pneumococcal pneumonia.

## 2021-10-24 NOTE — ASSESSMENT & PLAN NOTE
Above goal.  Resume hydralazine 10 mg twice daily. Continue lisinopril 20 mg twice daily and diltiazem 240 mg twice daily.

## 2021-10-24 NOTE — ASSESSMENT & PLAN NOTE
Following with [facility] as she wanted to undergo an MRI before doing physical therapy. Has not responded to steroid injection as well as she would have liked. She does not want to do physical therapy until she has a definitive diagnosis. Add Norco 5/325 mg 1/2 to 1 pill at at bedtime to see if that helps with her pain and improves her sleep.

## 2021-10-24 NOTE — ASSESSMENT & PLAN NOTE
Under reasonable control with hemoglobin A1c of 7%. Continue glipizide 2.5 mg twice daily and Metformin ER 1000 mg twice daily.

## 2021-11-05 ENCOUNTER — OFFICE VISIT (OUTPATIENT)
Dept: CARDIOLOGY CLINIC | Age: 71
End: 2021-11-05
Payer: MEDICARE

## 2021-11-05 VITALS
HEART RATE: 84 BPM | WEIGHT: 140 LBS | SYSTOLIC BLOOD PRESSURE: 122 MMHG | BODY MASS INDEX: 23.9 KG/M2 | OXYGEN SATURATION: 97 % | DIASTOLIC BLOOD PRESSURE: 70 MMHG | HEIGHT: 64 IN

## 2021-11-05 DIAGNOSIS — E78.5 HYPERLIPIDEMIA LDL GOAL <70: ICD-10-CM

## 2021-11-05 DIAGNOSIS — I10 PRIMARY HYPERTENSION: ICD-10-CM

## 2021-11-05 DIAGNOSIS — R00.2 PALPITATIONS: ICD-10-CM

## 2021-11-05 DIAGNOSIS — I25.10 CORONARY ARTERY DISEASE INVOLVING NATIVE CORONARY ARTERY OF NATIVE HEART WITHOUT ANGINA PECTORIS: Primary | ICD-10-CM

## 2021-11-05 PROCEDURE — 4040F PNEUMOC VAC/ADMIN/RCVD: CPT | Performed by: INTERNAL MEDICINE

## 2021-11-05 PROCEDURE — G8420 CALC BMI NORM PARAMETERS: HCPCS | Performed by: INTERNAL MEDICINE

## 2021-11-05 PROCEDURE — G8427 DOCREV CUR MEDS BY ELIG CLIN: HCPCS | Performed by: INTERNAL MEDICINE

## 2021-11-05 PROCEDURE — 3017F COLORECTAL CA SCREEN DOC REV: CPT | Performed by: INTERNAL MEDICINE

## 2021-11-05 PROCEDURE — 1090F PRES/ABSN URINE INCON ASSESS: CPT | Performed by: INTERNAL MEDICINE

## 2021-11-05 PROCEDURE — G8484 FLU IMMUNIZE NO ADMIN: HCPCS | Performed by: INTERNAL MEDICINE

## 2021-11-05 PROCEDURE — 99214 OFFICE O/P EST MOD 30 MIN: CPT | Performed by: INTERNAL MEDICINE

## 2021-11-05 PROCEDURE — 1036F TOBACCO NON-USER: CPT | Performed by: INTERNAL MEDICINE

## 2021-11-05 PROCEDURE — G8399 PT W/DXA RESULTS DOCUMENT: HCPCS | Performed by: INTERNAL MEDICINE

## 2021-11-05 PROCEDURE — 1123F ACP DISCUSS/DSCN MKR DOCD: CPT | Performed by: INTERNAL MEDICINE

## 2021-11-08 RX ORDER — BLOOD SUGAR DIAGNOSTIC
STRIP MISCELLANEOUS
Qty: 100 STRIP | Refills: 3 | OUTPATIENT
Start: 2021-11-08

## 2021-11-11 ENCOUNTER — TELEPHONE (OUTPATIENT)
Dept: INTERNAL MEDICINE CLINIC | Age: 71
End: 2021-11-11

## 2021-11-11 DIAGNOSIS — E11.9 TYPE 2 DIABETES MELLITUS WITHOUT COMPLICATION, WITHOUT LONG-TERM CURRENT USE OF INSULIN (HCC): ICD-10-CM

## 2021-11-11 RX ORDER — GLUCOSAMINE HCL/CHONDROITIN SU 500-400 MG
CAPSULE ORAL
Qty: 100 STRIP | Refills: 3 | Status: SHIPPED | OUTPATIENT
Start: 2021-11-11

## 2021-11-11 NOTE — TELEPHONE ENCOUNTER
Patient is calling in requesting a refill on test strips. She states that she is out of them. Please advise.

## 2021-12-07 ENCOUNTER — OFFICE VISIT (OUTPATIENT)
Dept: INTERNAL MEDICINE CLINIC | Age: 71
End: 2021-12-07
Payer: MEDICARE

## 2021-12-07 VITALS — OXYGEN SATURATION: 97 % | SYSTOLIC BLOOD PRESSURE: 138 MMHG | HEART RATE: 96 BPM | DIASTOLIC BLOOD PRESSURE: 52 MMHG

## 2021-12-07 DIAGNOSIS — E11.9 TYPE 2 DIABETES MELLITUS WITHOUT COMPLICATION, WITHOUT LONG-TERM CURRENT USE OF INSULIN (HCC): ICD-10-CM

## 2021-12-07 DIAGNOSIS — I10 PRIMARY HYPERTENSION: ICD-10-CM

## 2021-12-07 DIAGNOSIS — H92.02 LEFT EAR PAIN: ICD-10-CM

## 2021-12-07 DIAGNOSIS — E78.5 HYPERLIPIDEMIA LDL GOAL <70: Primary | ICD-10-CM

## 2021-12-07 PROCEDURE — G8420 CALC BMI NORM PARAMETERS: HCPCS | Performed by: INTERNAL MEDICINE

## 2021-12-07 PROCEDURE — 99213 OFFICE O/P EST LOW 20 MIN: CPT | Performed by: INTERNAL MEDICINE

## 2021-12-07 PROCEDURE — 3051F HG A1C>EQUAL 7.0%<8.0%: CPT | Performed by: INTERNAL MEDICINE

## 2021-12-07 PROCEDURE — 3017F COLORECTAL CA SCREEN DOC REV: CPT | Performed by: INTERNAL MEDICINE

## 2021-12-07 PROCEDURE — G8427 DOCREV CUR MEDS BY ELIG CLIN: HCPCS | Performed by: INTERNAL MEDICINE

## 2021-12-07 PROCEDURE — 2022F DILAT RTA XM EVC RTNOPTHY: CPT | Performed by: INTERNAL MEDICINE

## 2021-12-07 PROCEDURE — G8484 FLU IMMUNIZE NO ADMIN: HCPCS | Performed by: INTERNAL MEDICINE

## 2021-12-07 PROCEDURE — 1123F ACP DISCUSS/DSCN MKR DOCD: CPT | Performed by: INTERNAL MEDICINE

## 2021-12-07 PROCEDURE — G8399 PT W/DXA RESULTS DOCUMENT: HCPCS | Performed by: INTERNAL MEDICINE

## 2021-12-07 PROCEDURE — 1036F TOBACCO NON-USER: CPT | Performed by: INTERNAL MEDICINE

## 2021-12-07 PROCEDURE — 4040F PNEUMOC VAC/ADMIN/RCVD: CPT | Performed by: INTERNAL MEDICINE

## 2021-12-07 PROCEDURE — 1090F PRES/ABSN URINE INCON ASSESS: CPT | Performed by: INTERNAL MEDICINE

## 2021-12-07 NOTE — PROGRESS NOTES
Patient: Nancy Le is a 66 y.o. female who presents today with the following Chief Complaint(s):  Chief Complaint   Patient presents with    Check-Up    Otalgia       HPI     Here today for f/u on blood pressure. Did not  hydralazine. Did see Dr. Delonte Hyde. BP was well controlled at his visit. Is also c/o left ear pain-was getting sharp pains in her ear. Pain was sharp, stabbing pain. Stopped about 1 week ago. Then felt like she could not hear. Hearing is better. Now her sinuses just feel congested, feels dried up in her sinuses, nose and throat. Has used saline \"every so often\" which does help. Did feel lightheaded during the ear pain. Still dealing with pain in her right knee. Had to change from open to closed MRI d/t claustrophobia. Blood sugars have been running low so she is now taking glipizide 2.5 mg twice daily instead of 5 mg twice daily.         Results for orders placed or performed in visit on 10/20/21   HEMOGLOBIN A1C   Result Value Ref Range    Hemoglobin A1C 7.0 See comment %    eAG 154.2 mg/dL   COMPREHENSIVE METABOLIC PANEL   Result Value Ref Range    Sodium 139 136 - 145 mmol/L    Potassium 4.2 3.5 - 5.1 mmol/L    Chloride 97 (L) 99 - 110 mmol/L    CO2 24 21 - 32 mmol/L    Anion Gap 18 (H) 3 - 16    Glucose 84 70 - 99 mg/dL    BUN 17 7 - 20 mg/dL    CREATININE 0.5 (L) 0.6 - 1.2 mg/dL    GFR Non-African American >60 >60    GFR African American >60 >60    Calcium 10.3 8.3 - 10.6 mg/dL    Total Protein 7.5 6.4 - 8.2 g/dL    Albumin 4.8 3.4 - 5.0 g/dL    Albumin/Globulin Ratio 1.8 1.1 - 2.2    Total Bilirubin <0.2 0.0 - 1.0 mg/dL    Alkaline Phosphatase 85 40 - 129 U/L    ALT 15 10 - 40 U/L    AST 14 (L) 15 - 37 U/L    Globulin 2.7 Not Established g/dL   Anti SSA   Result Value Ref Range    Anti-SS-A IgG <0.2 0.0 - 0.9 AI   Anti SSB   Result Value Ref Range    Anti-SS-B IgG <0.2 0.0 - 0.9 AI          Allergies   Allergen Reactions    Asa Arthritis Strength-Antacid Renan Billings Darlene Monse Hyd-Mg Hyd] Nausea Only     Per patient stopped taking because it upset her stomach. She denies itching, swelling, rash.  Aspirin      Other reaction(s): GI Upset    Atorvastatin      Other reaction(s): Muscle Aches    Erythromycin Base Hives    Macrolides And Ketolides     Troleandomycin     Verapamil      Other reaction(s): Other (See Comments)  Low heart rate     Sulfa Antibiotics Rash      Past Medical History:   Diagnosis Date    Allergic rhinitis     Fibromyalgia     GERD (gastroesophageal reflux disease)     Heart attack ([address]     Heart disease     Herniated disc     Hyperlipidemia     Hypertension     Type II or unspecified type diabetes mellitus without mention of complication, not stated as uncontrolled       Past Surgical History:   Procedure Laterality Date    CORONARY ANGIOPLASTY WITH STENT PLACEMENT  2018    HYMENECTOMY        Social History     Socioeconomic History    Marital status:      Spouse name: Not on file    Number of children: Not on file    Years of education: Not on file    Highest education level: Not on file   Occupational History    Not on file   Tobacco Use    Smoking status: Never Smoker    Smokeless tobacco: Never Used   Vaping Use    Vaping Use: Never used   Substance and Sexual Activity    Alcohol use: No    Drug use: No    Sexual activity: Not on file     Comment:    Other Topics Concern    Not on file   Social History Narrative    Not on file     Social Determinants of Health     Financial Resource Strain:     Difficulty of Paying Living Expenses: Not on file   Food Insecurity:     Worried About Running Out of Food in the Last Year: Not on file    Yoko of Food in the Last Year: Not on file   Transportation Needs:     Lack of Transportation (Medical): Not on file    Lack of Transportation (Non-Medical):  Not on file   Physical Activity:     Days of Exercise per Week: Not on file    Minutes of Exercise per Session: Not on file   Stress:     Feeling of Stress : Not on file   Social Connections:     Frequency of Communication with Friends and Family: Not on file    Frequency of Social Gatherings with Friends and Family: Not on file    Attends Latter-day Services: Not on file    Active Member of [address] [place] [address] or Organizations: Not on file    Attends Club or Organization Meetings: Not on file    Marital Status: Not on file   Intimate Partner Violence:     Fear of Current or Ex-Partner: Not on file    Emotionally Abused: Not on file    Physically Abused: Not on file    Sexually Abused: Not on file   Housing Stability:     Unable to Pay for Housing in the Last Year: Not on file    Number of [place] in the Last Year: Not on file    Unstable Housing in the Last Year: Not on file     Family History   Problem Relation Age of Onset    Heart Disease Mother     Cancer Father         ? lymphoma ( when patient was very young)    Diabetes Sister     Thyroid Disease Daughter         Outpatient Medications Prior to Visit   Medication Sig Dispense Refill    blood glucose monitor strips Test daily and as needed for symptoms of irregular blood glucose. Dispense sufficient amount for indicated testing frequency plus additional to accommodate PRN testing needs. 100 strip 3    metFORMIN (GLUCOPHAGE-XR) 500 MG extended release tablet Take 2 tablets by mouth 2 times daily 360 tablet 3    lisinopril (PRINIVIL;ZESTRIL) 20 MG tablet Take 1 tablet by mouth 2 times daily 180 tablet 3    dilTIAZem (TIAZAC) 240 MG extended release capsule Take 1 capsule by mouth 2 times daily 180 capsule 3    acetaminophen (TYLENOL) 500 MG tablet Take 500 mg by mouth every 6 hours as needed for Pain      glipiZIDE (GLUCOTROL) 5 MG tablet Take 0.5 tablets by mouth 2 times daily (before meals) 90 tablet 3    blood glucose monitor strips Test 3 times a day & as needed for symptoms of irregular blood glucose.  Dispense sufficient amount for indicated testing frequency plus additional to accommodate PRN testing needs. Which ever the insurance will cover. 100 strip 3    rosuvastatin (CRESTOR) 10 MG tablet Take 1 tablet by mouth nightly 90 tablet 3    Elastic Bandages & Supports (CARPAL TUNNEL WRIST DELUXE) MISC 1 each by Does not apply route nightly 1 each 0    aspirin EC 81 MG EC tablet Take 1 tablet by mouth daily 90 tablet 1    vitamin D3 (CHOLECALCIFEROL) 25 MCG (1000 UT) TABS tablet Take 1,000 Units by mouth daily      vitamin B-12 (CYANOCOBALAMIN) 1000 MCG tablet Take 1,000 mcg by mouth daily      Multiple Vitamins-Minerals (CENTRUM SILVER) TABS Take by mouth      nitroGLYCERIN (NITROSTAT) 0.4 MG SL tablet Place 1 tablet under the tongue every 5 minutes as needed for Chest pain 25 tablet 2    hydrALAZINE (APRESOLINE) 10 MG tablet Take 1 tablet by mouth 2 times daily 60 tablet 3     No facility-administered medications prior to visit. Patient'spast medical history, surgical history, family history, medications,  and allergies  were all reviewed and updated as appropriate today. Review of Systems    BP (!) 138/52   Pulse 96   SpO2 97%   Physical Exam  Vitals and nursing note reviewed. Constitutional:       Appearance: She is well-developed. She is not toxic-appearing. HENT:      Head: Normocephalic. Right Ear: Tympanic membrane, ear canal and external ear normal.      Left Ear: Tympanic membrane, ear canal and external ear normal.      Mouth/Throat:      Pharynx: No oropharyngeal exudate or posterior oropharyngeal erythema. Eyes:      General: No scleral icterus. Extraocular Movements: Extraocular movements intact. Conjunctiva/sclera: Conjunctivae normal.      Pupils: Pupils are equal, round, and reactive to light. Neck:      Thyroid: No thyroid mass or thyromegaly. Vascular: No carotid bruit. Cardiovascular:      Rate and Rhythm: Normal rate and regular rhythm. Heart sounds: Normal heart sounds.  No murmur heard. Pulmonary:      Effort: Pulmonary effort is normal.      Breath sounds: Normal breath sounds. Musculoskeletal:      Right lower leg: No edema. Left lower leg: No edema. Lymphadenopathy:      Cervical: No cervical adenopathy. Neurological:      General: No focal deficit present. Mental Status: She is alert and oriented to person, place, and time. Psychiatric:         Mood and Affect: Mood normal.         Behavior: Behavior normal. Behavior is cooperative. ASSESSMENT/PLAN:    Problem List Items Addressed This Visit     Type 2 diabetes mellitus without complication, without long-term current use of insulin (HCC)     Continue glipizide 2.5 mg twice daily and Metformin ER 1000 mg twice daily. Glipizide 5 mg twice daily made her blood pressures drop too low. Relevant Orders    Comprehensive Metabolic Panel    Hemoglobin A1C    Lipid Panel    Microalbumin / Creatinine Urine Ratio    Left ear pain     Has resolved. Continue with nasal saline. Suspect ear pain is related to congestion. Hyperlipidemia LDL goal <70 - Primary    Relevant Orders    Lipid Panel    HTN (hypertension)      Patient did not start hydralazine as ordered as when she saw Dr. Kaiden Calvillo her blood pressure was good. She remains on diltiazem 240 mg twice daily and lisinopril 20 mg twice daily. Current Outpatient Medications   Medication Sig Dispense Refill    blood glucose monitor strips Test daily and as needed for symptoms of irregular blood glucose. Dispense sufficient amount for indicated testing frequency plus additional to accommodate PRN testing needs.  100 strip 3    metFORMIN (GLUCOPHAGE-XR) 500 MG extended release tablet Take 2 tablets by mouth 2 times daily 360 tablet 3    lisinopril (PRINIVIL;ZESTRIL) 20 MG tablet Take 1 tablet by mouth 2 times daily 180 tablet 3    dilTIAZem (TIAZAC) 240 MG extended release capsule Take 1 capsule by mouth 2 times daily 180 capsule 3  acetaminophen (TYLENOL) 500 MG tablet Take 500 mg by mouth every 6 hours as needed for Pain      glipiZIDE (GLUCOTROL) 5 MG tablet Take 0.5 tablets by mouth 2 times daily (before meals) 90 tablet 3    blood glucose monitor strips Test 3 times a day & as needed for symptoms of irregular blood glucose. Dispense sufficient amount for indicated testing frequency plus additional to accommodate PRN testing needs. Which ever the insurance will cover. 100 strip 3    rosuvastatin (CRESTOR) 10 MG tablet Take 1 tablet by mouth nightly 90 tablet 3    Elastic Bandages & Supports (CARPAL TUNNEL WRIST DELUXE) MISC 1 each by Does not apply route nightly 1 each 0    aspirin EC 81 MG EC tablet Take 1 tablet by mouth daily 90 tablet 1    vitamin D3 (CHOLECALCIFEROL) 25 MCG (1000 UT) TABS tablet Take 1,000 Units by mouth daily      vitamin B-12 (CYANOCOBALAMIN) 1000 MCG tablet Take 1,000 mcg by mouth daily      Multiple Vitamins-Minerals (CENTRUM SILVER) TABS Take by mouth      nitroGLYCERIN (NITROSTAT) 0.4 MG SL tablet Place 1 tablet under the tongue every 5 minutes as needed for Chest pain 25 tablet 2     No current facility-administered medications for this visit. Return in about 4 months (around 4/7/2022) for labs prior.

## 2021-12-07 NOTE — PATIENT INSTRUCTIONS
Please have your labs drawn about 1 week prior to your next appointment in April . You may get your labs drawn in our office, Monday-Friday from 8:30 am-4:45 pm. You do not need an appointment. If this does not work for you, you may also have your labs drawn at [facility] earlier during the week or on weekends. If you prefer to have your labs draw elsewhere ([facility]), please allow a little more time for me to get your results and please call the office ahead of your appointment so that we may make sure that we have your labs at the time of your appointment. We sometimes need to call the lab directly (when not done at a  [facility] lab) to get your results. Your labs are fasting.    fasting (nothing to eat w/in 8 hours of the lab test, is ok to drink water or black coffee)

## 2021-12-12 PROBLEM — H92.02 LEFT EAR PAIN: Status: ACTIVE | Noted: 2021-12-12

## 2021-12-12 NOTE — ASSESSMENT & PLAN NOTE
Continue glipizide 2.5 mg twice daily and Metformin ER 1000 mg twice daily. Glipizide 5 mg twice daily made her blood pressures drop too low.

## 2021-12-12 NOTE — ASSESSMENT & PLAN NOTE
Patient did not start hydralazine as ordered as when she saw Dr. Liset Kelley her blood pressure was good. She remains on diltiazem 240 mg twice daily and lisinopril 20 mg twice daily.

## 2022-03-02 RX ORDER — ROSUVASTATIN CALCIUM 10 MG/1
TABLET, COATED ORAL
Qty: 90 TABLET | Refills: 3 | Status: SHIPPED | OUTPATIENT
Start: 2022-03-02 | End: 2022-04-12 | Stop reason: SDUPTHER

## 2022-04-05 NOTE — TELEPHONE ENCOUNTER
Pt notified. Azathioprine Counseling:  I discussed with the patient the risks of azathioprine including but not limited to myelosuppression, immunosuppression, hepatotoxicity, lymphoma, and infections.  The patient understands that monitoring is required including baseline LFTs, Creatinine, possible TPMP genotyping and weekly CBCs for the first month and then every 2 weeks thereafter.  The patient verbalized understanding of the proper use and possible adverse effects of azathioprine.  All of the patient's questions and concerns were addressed.

## 2022-04-12 ENCOUNTER — OFFICE VISIT (OUTPATIENT)
Dept: INTERNAL MEDICINE CLINIC | Age: 72
End: 2022-04-12
Payer: MEDICARE

## 2022-04-12 VITALS
SYSTOLIC BLOOD PRESSURE: 148 MMHG | OXYGEN SATURATION: 97 % | BODY MASS INDEX: 23.86 KG/M2 | HEART RATE: 102 BPM | WEIGHT: 139 LBS | DIASTOLIC BLOOD PRESSURE: 60 MMHG

## 2022-04-12 DIAGNOSIS — I25.2 HISTORY OF MI (MYOCARDIAL INFARCTION): ICD-10-CM

## 2022-04-12 DIAGNOSIS — E53.8 B12 DEFICIENCY: ICD-10-CM

## 2022-04-12 DIAGNOSIS — E11.9 TYPE 2 DIABETES MELLITUS WITHOUT COMPLICATION, WITHOUT LONG-TERM CURRENT USE OF INSULIN (HCC): ICD-10-CM

## 2022-04-12 DIAGNOSIS — I10 PRIMARY HYPERTENSION: Primary | ICD-10-CM

## 2022-04-12 DIAGNOSIS — M25.561 ACUTE PAIN OF BOTH KNEES: ICD-10-CM

## 2022-04-12 DIAGNOSIS — E78.5 HYPERLIPIDEMIA LDL GOAL <70: ICD-10-CM

## 2022-04-12 DIAGNOSIS — R68.2 DRY MOUTH: ICD-10-CM

## 2022-04-12 DIAGNOSIS — M79.641 PAIN IN BOTH HANDS: ICD-10-CM

## 2022-04-12 DIAGNOSIS — M25.562 ACUTE PAIN OF BOTH KNEES: ICD-10-CM

## 2022-04-12 DIAGNOSIS — M79.642 PAIN IN BOTH HANDS: ICD-10-CM

## 2022-04-12 DIAGNOSIS — E55.9 VITAMIN D INSUFFICIENCY: ICD-10-CM

## 2022-04-12 DIAGNOSIS — I25.10 CORONARY ARTERY DISEASE INVOLVING NATIVE CORONARY ARTERY OF NATIVE HEART WITHOUT ANGINA PECTORIS: ICD-10-CM

## 2022-04-12 LAB — HBA1C MFR BLD: NORMAL %

## 2022-04-12 PROCEDURE — 99214 OFFICE O/P EST MOD 30 MIN: CPT | Performed by: INTERNAL MEDICINE

## 2022-04-12 PROCEDURE — 1123F ACP DISCUSS/DSCN MKR DOCD: CPT | Performed by: INTERNAL MEDICINE

## 2022-04-12 PROCEDURE — G8427 DOCREV CUR MEDS BY ELIG CLIN: HCPCS | Performed by: INTERNAL MEDICINE

## 2022-04-12 PROCEDURE — 2022F DILAT RTA XM EVC RTNOPTHY: CPT | Performed by: INTERNAL MEDICINE

## 2022-04-12 PROCEDURE — G8399 PT W/DXA RESULTS DOCUMENT: HCPCS | Performed by: INTERNAL MEDICINE

## 2022-04-12 PROCEDURE — G8420 CALC BMI NORM PARAMETERS: HCPCS | Performed by: INTERNAL MEDICINE

## 2022-04-12 PROCEDURE — 83036 HEMOGLOBIN GLYCOSYLATED A1C: CPT | Performed by: INTERNAL MEDICINE

## 2022-04-12 PROCEDURE — 1036F TOBACCO NON-USER: CPT | Performed by: INTERNAL MEDICINE

## 2022-04-12 PROCEDURE — 1090F PRES/ABSN URINE INCON ASSESS: CPT | Performed by: INTERNAL MEDICINE

## 2022-04-12 PROCEDURE — 4040F PNEUMOC VAC/ADMIN/RCVD: CPT | Performed by: INTERNAL MEDICINE

## 2022-04-12 PROCEDURE — 3017F COLORECTAL CA SCREEN DOC REV: CPT | Performed by: INTERNAL MEDICINE

## 2022-04-12 PROCEDURE — 3046F HEMOGLOBIN A1C LEVEL >9.0%: CPT | Performed by: INTERNAL MEDICINE

## 2022-04-12 RX ORDER — METFORMIN HYDROCHLORIDE 500 MG/1
1000 TABLET, EXTENDED RELEASE ORAL 2 TIMES DAILY
Qty: 360 TABLET | Refills: 3 | Status: SHIPPED | OUTPATIENT
Start: 2022-04-12 | End: 2022-08-12 | Stop reason: SDUPTHER

## 2022-04-12 RX ORDER — GLIPIZIDE 2.5 MG/1
2.5 TABLET, EXTENDED RELEASE ORAL 2 TIMES DAILY WITH MEALS
Qty: 60 TABLET | Refills: 5 | Status: SHIPPED | OUTPATIENT
Start: 2022-04-12 | End: 2022-10-10

## 2022-04-12 RX ORDER — DILTIAZEM HYDROCHLORIDE 240 MG/1
240 CAPSULE, EXTENDED RELEASE ORAL 2 TIMES DAILY
Qty: 180 CAPSULE | Refills: 3 | Status: SHIPPED | OUTPATIENT
Start: 2022-04-12

## 2022-04-12 RX ORDER — GLIPIZIDE 5 MG/1
2.5 TABLET ORAL
Qty: 90 TABLET | Refills: 3 | Status: SHIPPED
Start: 2022-04-12 | End: 2022-04-12

## 2022-04-12 RX ORDER — ROSUVASTATIN CALCIUM 10 MG/1
TABLET, COATED ORAL
Qty: 90 TABLET | Refills: 3 | Status: SHIPPED | OUTPATIENT
Start: 2022-04-12

## 2022-04-12 RX ORDER — NITROGLYCERIN 0.4 MG/1
0.4 TABLET SUBLINGUAL EVERY 5 MIN PRN
Qty: 25 TABLET | Refills: 2 | Status: SHIPPED | OUTPATIENT
Start: 2022-04-12

## 2022-04-12 RX ORDER — LISINOPRIL 20 MG/1
20 TABLET ORAL 2 TIMES DAILY
Qty: 180 TABLET | Refills: 3 | Status: SHIPPED | OUTPATIENT
Start: 2022-04-12

## 2022-04-12 ASSESSMENT — PATIENT HEALTH QUESTIONNAIRE - PHQ9
1. LITTLE INTEREST OR PLEASURE IN DOING THINGS: 0
SUM OF ALL RESPONSES TO PHQ9 QUESTIONS 1 & 2: 0
2. FEELING DOWN, DEPRESSED OR HOPELESS: 0
SUM OF ALL RESPONSES TO PHQ QUESTIONS 1-9: 0

## 2022-04-12 ASSESSMENT — ENCOUNTER SYMPTOMS
DIARRHEA: 0
SHORTNESS OF BREATH: 0
CONSTIPATION: 0
CHEST TIGHTNESS: 0

## 2022-04-12 NOTE — PROGRESS NOTES
Patient: Petey Kaur is a 66 y.o. female who presents today with the following Chief Complaint(s):  Chief Complaint   Patient presents with    Check-Up       HPI     Here today for follow up. Is having muscle spasms in her stomach and esophagus. Currently on-going. Has had in the past but has not happened in a while. Did eat breakfast this morning. Has found that antiacids are somewhat helpful. Has not had blood work done as she was dog sitting 3 dogs for her family. DM- sugars have not been \"real good\". Had a steroid shot in her knee and sugars were running over 300. Last injection was 2 months ago. No low blood sugars since decreasing glipizide to 2.5 mg BID. Was previously on Januvia but was too expensive. Admits to have not been taking care of herself with her sugars as well as she should. Does not watch her carbs as well as she knows that she should. HTN- typically controlled. On lisinopirl and diltiazem. Missed her evening dose last night. HLD- remains on Crestor 10 mg qd. Is having a lot of problems with her knees, right worse than left. Did see Dr. Corrinne Harman in August. She did not like recommendation for PT so got a second opinion with Dr. Sol Ortega at [facility]. Has not been back to see him for over 2 months. Has not been back to see him as he did not give her an answer as to what is going on with her knee. Pain is better since she had steroid shots in her knees. Did not like that he did not examine her knee and went directly to give her a shot. Would like a third opinion. Is also c/o hands being stiff and sore. Is having a stress test with Dr. Sowmya Herrera in May. Has been having stomach pains. With MI, she had pain in her stomach, neck, and back. Current pains are not as severe as what she had with her MI.          Wt Readings from Last 3 Encounters:   04/12/22 139 lb (63 kg)   11/05/21 140 lb (63.5 kg)   10/20/21 140 lb 12.8 oz (63.9 kg)     Temp Readings from Last 3 Encounters: 08/03/21 97.5 °F (36.4 °C)   04/16/21 97.7 °F (36.5 °C)   03/31/21 97.3 °F (36.3 °C) (Temporal)     BP Readings from Last 3 Encounters:   04/12/22 (!) 148/60   12/07/21 (!) 138/52   11/05/21 122/70     Pulse Readings from Last 3 Encounters:   04/12/22 102   12/07/21 96   11/05/21 84         Results for orders placed or performed in visit on 04/12/22   POCT glycosylated hemoglobin (Hb A1C)   Result Value Ref Range    Hemoglobin A1C          Allergies   Allergen Reactions    Asa Arthritis Strength-Antacid [Aspirin Buff, Al Hyd-Mg Hyd] Nausea Only     Per patient stopped taking because it upset her stomach. She denies itching, swelling, rash.  Aspirin      Other reaction(s): GI Upset    Atorvastatin      Other reaction(s): Muscle Aches    Erythromycin Base Hives    Macrolides And Ketolides     Troleandomycin     Verapamil      Other reaction(s):  Other (See Comments)  Low heart rate     Sulfa Antibiotics Rash      Past Medical History:   Diagnosis Date    Allergic rhinitis     Fibromyalgia     GERD (gastroesophageal reflux disease)     Heart attack ([facility] [address]     Heart disease     Herniated disc     Hyperlipidemia     Hypertension     Type II or unspecified type diabetes mellitus without mention of complication, not stated as uncontrolled       Past Surgical History:   Procedure Laterality Date    CORONARY ANGIOPLASTY WITH STENT PLACEMENT  2018    HYMENECTOMY        Social History     Socioeconomic History    Marital status:      Spouse name: Not on file    Number of children: Not on file    Years of education: Not on file    Highest education level: Not on file   Occupational History    Not on file   Tobacco Use    Smoking status: Never Smoker    Smokeless tobacco: Never Used   Vaping Use    Vaping Use: Never used   Substance and Sexual Activity    Alcohol use: No    Drug use: No    Sexual activity: Not on file     Comment:    Other Topics Concern    Not on file   Social History Narrative    Not on file     Social Determinants of Health     Financial Resource Strain:     Difficulty of Paying Living Expenses: Not on file   Food Insecurity:     Worried About Running Out of Food in the Last Year: Not on file    Yoko of Food in the Last Year: Not on file   Transportation Needs:     Lack of Transportation (Medical): Not on file    Lack of Transportation (Non-Medical): Not on file   Physical Activity:     Days of Exercise per Week: Not on file    Minutes of Exercise per Session: Not on file   Stress:     Feeling of Stress : Not on file   Social Connections:     Frequency of Communication with Friends and Family: Not on file    Frequency of Social Gatherings with Friends and Family: Not on file    Attends [employer] Services: Not on file    Active Member of [address] [place] [address] [employer] or Organizations: Not on file    Attends Club or Organization Meetings: Not on file    Marital Status: Not on file   Intimate Partner Violence:     Fear of Current or Ex-Partner: Not on file    Emotionally Abused: Not on file    Physically Abused: Not on file    Sexually Abused: Not on file   Housing Stability:     Unable to Pay for Housing in the Last Year: Not on file    Number of [place] in the Last Year: Not on file    Unstable Housing in the Last Year: Not on file     Family History   Problem Relation Age of Onset    Heart Disease Mother     Cancer Father         ? lymphoma ( when patient was very young)    Diabetes Sister     Thyroid Disease Daughter         Outpatient Medications Prior to Visit   Medication Sig Dispense Refill    blood glucose monitor strips Test daily and as needed for symptoms of irregular blood glucose. Dispense sufficient amount for indicated testing frequency plus additional to accommodate PRN testing needs.  100 strip 3    acetaminophen (TYLENOL) 500 MG tablet Take 500 mg by mouth every 6 hours as needed for Pain      blood glucose monitor strips Test 3 times a day & as needed for symptoms of irregular blood glucose. Dispense sufficient amount for indicated testing frequency plus additional to accommodate PRN testing needs. Which ever the insurance will cover. 100 strip 3    Elastic Bandages & Supports (CARPAL TUNNEL WRIST DELUXE) MISC 1 each by Does not apply route nightly 1 each 0    aspirin EC 81 MG EC tablet Take 1 tablet by mouth daily 90 tablet 1    vitamin D3 (CHOLECALCIFEROL) 25 MCG (1000 UT) TABS tablet Take 1,000 Units by mouth daily      vitamin B-12 (CYANOCOBALAMIN) 1000 MCG tablet Take 1,000 mcg by mouth daily      Multiple Vitamins-Minerals (CENTRUM SILVER) TABS Take by mouth      rosuvastatin (CRESTOR) 10 MG tablet TAKE ONE TABLET BY MOUTH ONCE NIGHTLY 90 tablet 3    metFORMIN (GLUCOPHAGE-XR) 500 MG extended release tablet Take 2 tablets by mouth 2 times daily 360 tablet 3    lisinopril (PRINIVIL;ZESTRIL) 20 MG tablet Take 1 tablet by mouth 2 times daily 180 tablet 3    dilTIAZem (TIAZAC) 240 MG extended release capsule Take 1 capsule by mouth 2 times daily 180 capsule 3    glipiZIDE (GLUCOTROL) 5 MG tablet Take 0.5 tablets by mouth 2 times daily (before meals) 90 tablet 3    nitroGLYCERIN (NITROSTAT) 0.4 MG SL tablet Place 1 tablet under the tongue every 5 minutes as needed for Chest pain 25 tablet 2     No facility-administered medications prior to visit. Patient'spast medical history, surgical history, family history, medications,  and allergies  were all reviewed and updated as appropriate today. Review of Systems   Constitutional: Negative for appetite change, fatigue and fever. Respiratory: Negative for chest tightness and shortness of breath. Cardiovascular: Negative for chest pain. Gastrointestinal: Negative for constipation and diarrhea. Skin: Negative for rash.        BP (!) 148/60   Pulse 102   Wt 139 lb (63 kg) Comment: per pt. she did not want to weigh today in the office  SpO2 97%   BMI 23.86 kg/m²   Physical Exam  Vitals and nursing note reviewed. Constitutional:       Appearance: She is well-developed. She is not toxic-appearing. HENT:      Head: Normocephalic. Right Ear: Tympanic membrane, ear canal and external ear normal.      Left Ear: Tympanic membrane, ear canal and external ear normal.      Mouth/Throat:      Pharynx: No oropharyngeal exudate or posterior oropharyngeal erythema. Eyes:      General: No scleral icterus. Extraocular Movements: Extraocular movements intact. Conjunctiva/sclera: Conjunctivae normal.      Pupils: Pupils are equal, round, and reactive to light. Neck:      Thyroid: No thyroid mass or thyromegaly. Vascular: No carotid bruit. Cardiovascular:      Rate and Rhythm: Normal rate and regular rhythm. Heart sounds: Normal heart sounds. No murmur heard. Pulmonary:      Effort: Pulmonary effort is normal.      Breath sounds: Normal breath sounds. Musculoskeletal:      Right lower leg: No edema. Left lower leg: No edema. Lymphadenopathy:      Cervical: No cervical adenopathy. Neurological:      General: No focal deficit present. Mental Status: She is alert and oriented to person, place, and time. Psychiatric:         Mood and Affect: Mood normal.         Behavior: Behavior normal. Behavior is cooperative. ASSESSMENT/PLAN:    Problem List Items Addressed This Visit     Acute pain of both knees      Referral placed to Dr. Frandy Espino for a third opinion. Relevant Orders    External Referral To Orthopedic Surgery    B12 deficiency      Check B12 level. Previously on B12 injections. Coronary artery disease involving native coronary artery of native heart without angina pectoris     Asymptomatic. Continues to follow with Dr. Danielle Leonard. Remains on aspirin, lisinopril 40 mg daily, Cardizem  mg twice daily, and Crestor 10 mg daily with as needed nitroglycerin.          Relevant Medications    dilTIAZem (TIAZAC) 240 MG extended release capsule    lisinopril (PRINIVIL;ZESTRIL) 20 MG tablet    rosuvastatin (CRESTOR) 10 MG tablet    nitroGLYCERIN (NITROSTAT) 0.4 MG SL tablet    Dry mouth     SSA and SSB were negative in October 2021. History of MI (myocardial infarction)     Status post MI in April 2018 that was treated with PTCA and stenting. Follows with Dr. Stanley Ferreira. On ASA, lisinopril 40 mg qd, diltiazem  mg mg twice daily, and Crestor 10 mg qd with NTG SL prn. Sx of CAD were NULL and stomach pain. Relevant Orders    Comprehensive Metabolic Panel    HTN (hypertension) - Primary     Elevated today. States blood pressure is better controlled outside of the office. Remains on diltiazem  mg twice daily and lisinopril 20 mg twice daily. Did miss her evening doses of medication last night. Hyperlipidemia LDL goal <70      Check lipid panel, CMP. Continue Crestor 10 mg daily. Relevant Medications    dilTIAZem (TIAZAC) 240 MG extended release capsule    lisinopril (PRINIVIL;ZESTRIL) 20 MG tablet    rosuvastatin (CRESTOR) 10 MG tablet    nitroGLYCERIN (NITROSTAT) 0.4 MG SL tablet    Other Relevant Orders    Lipid Panel    Pain in both hands     Likely osteoarthritis. Will discuss with Dr. Bebo Aguilar when she establishes with her for knee pain. Recommend Voltaren gel. Relevant Orders    External Referral To Orthopedic Surgery    Type 2 diabetes mellitus without complication, without long-term current use of insulin (HCC)     Check hemoglobin A1c. Continue glipizide 2.5 mg twice daily and Metformin ER 1000 mg twice daily. Relevant Medications    metFORMIN (GLUCOPHAGE-XR) 500 MG extended release tablet    glipiZIDE (GLUCOTROL XL) 2.5 MG extended release tablet    Other Relevant Orders    POCT glycosylated hemoglobin (Hb A1C) (Completed)    Comprehensive Metabolic Panel    Hemoglobin A1C    Vitamin D insufficiency      Check vitamin D level.                Current Outpatient Medications Medication Sig Dispense Refill    dilTIAZem (TIAZAC) 240 MG extended release capsule Take 1 capsule by mouth 2 times daily 180 capsule 3    lisinopril (PRINIVIL;ZESTRIL) 20 MG tablet Take 1 tablet by mouth 2 times daily 180 tablet 3    metFORMIN (GLUCOPHAGE-XR) 500 MG extended release tablet Take 2 tablets by mouth 2 times daily 360 tablet 3    rosuvastatin (CRESTOR) 10 MG tablet TAKE ONE TABLET BY MOUTH ONCE NIGHTLY 90 tablet 3    nitroGLYCERIN (NITROSTAT) 0.4 MG SL tablet Place 1 tablet under the tongue every 5 minutes as needed for Chest pain 25 tablet 2    glipiZIDE (GLUCOTROL XL) 2.5 MG extended release tablet Take 1 tablet by mouth 2 times daily (with meals) 60 tablet 5    diclofenac sodium (VOLTAREN) 1 % GEL 2 grams applied to both hands QID prn; 4 grams applied to knees QID prn. 150 g 3    blood glucose monitor strips Test daily and as needed for symptoms of irregular blood glucose. Dispense sufficient amount for indicated testing frequency plus additional to accommodate PRN testing needs. 100 strip 3    acetaminophen (TYLENOL) 500 MG tablet Take 500 mg by mouth every 6 hours as needed for Pain      blood glucose monitor strips Test 3 times a day & as needed for symptoms of irregular blood glucose. Dispense sufficient amount for indicated testing frequency plus additional to accommodate PRN testing needs. Which ever the insurance will cover. 100 strip 3    Elastic Bandages & Supports (CARPAL TUNNEL WRIST DELUXE) MISC 1 each by Does not apply route nightly 1 each 0    aspirin EC 81 MG EC tablet Take 1 tablet by mouth daily 90 tablet 1    vitamin D3 (CHOLECALCIFEROL) 25 MCG (1000 UT) TABS tablet Take 1,000 Units by mouth daily      vitamin B-12 (CYANOCOBALAMIN) 1000 MCG tablet Take 1,000 mcg by mouth daily      Multiple Vitamins-Minerals (CENTRUM SILVER) TABS Take by mouth       No current facility-administered medications for this visit. Return in about 4 months (around 8/12/2022).

## 2022-04-14 DIAGNOSIS — E78.5 HYPERLIPIDEMIA LDL GOAL <70: ICD-10-CM

## 2022-04-14 DIAGNOSIS — E11.9 TYPE 2 DIABETES MELLITUS WITHOUT COMPLICATION, WITHOUT LONG-TERM CURRENT USE OF INSULIN (HCC): ICD-10-CM

## 2022-04-14 LAB
A/G RATIO: 1.9 (ref 1.1–2.2)
ALBUMIN SERPL-MCNC: 4.5 G/DL (ref 3.4–5)
ALP BLD-CCNC: 73 U/L (ref 40–129)
ALT SERPL-CCNC: 13 U/L (ref 10–40)
ANION GAP SERPL CALCULATED.3IONS-SCNC: 16 MMOL/L (ref 3–16)
AST SERPL-CCNC: 13 U/L (ref 15–37)
BILIRUB SERPL-MCNC: 0.4 MG/DL (ref 0–1)
BUN BLDV-MCNC: 11 MG/DL (ref 7–20)
CALCIUM SERPL-MCNC: 9.3 MG/DL (ref 8.3–10.6)
CHLORIDE BLD-SCNC: 103 MMOL/L (ref 99–110)
CHOLESTEROL, TOTAL: 139 MG/DL (ref 0–199)
CO2: 22 MMOL/L (ref 21–32)
CREAT SERPL-MCNC: <0.5 MG/DL (ref 0.6–1.2)
CREATININE URINE: 135.4 MG/DL (ref 28–259)
GFR AFRICAN AMERICAN: >60
GFR NON-AFRICAN AMERICAN: >60
GLUCOSE BLD-MCNC: 167 MG/DL (ref 70–99)
HDLC SERPL-MCNC: 42 MG/DL (ref 40–60)
LDL CHOLESTEROL CALCULATED: 58 MG/DL
MICROALBUMIN UR-MCNC: 2.6 MG/DL
MICROALBUMIN/CREAT UR-RTO: 19.2 MG/G (ref 0–30)
POTASSIUM SERPL-SCNC: 4.1 MMOL/L (ref 3.5–5.1)
SODIUM BLD-SCNC: 141 MMOL/L (ref 136–145)
TOTAL PROTEIN: 6.9 G/DL (ref 6.4–8.2)
TRIGL SERPL-MCNC: 194 MG/DL (ref 0–150)
VLDLC SERPL CALC-MCNC: 39 MG/DL

## 2022-04-18 PROBLEM — M79.642 PAIN IN BOTH HANDS: Status: ACTIVE | Noted: 2022-04-18

## 2022-04-18 PROBLEM — R68.2 DRY MOUTH: Status: ACTIVE | Noted: 2021-10-23

## 2022-04-18 PROBLEM — M79.641 PAIN IN BOTH HANDS: Status: ACTIVE | Noted: 2022-04-18

## 2022-04-18 PROBLEM — M25.562 ACUTE PAIN OF BOTH KNEES: Status: ACTIVE | Noted: 2021-10-23

## 2022-05-02 ENCOUNTER — TELEPHONE (OUTPATIENT)
Dept: INTERNAL MEDICINE CLINIC | Age: 72
End: 2022-05-02

## 2022-05-02 ENCOUNTER — HOSPITAL ENCOUNTER (OUTPATIENT)
Dept: NON INVASIVE DIAGNOSTICS | Age: 72
Discharge: HOME | End: 2022-05-02

## 2022-05-02 RX ORDER — ONDANSETRON 8 MG/1
8 TABLET, ORALLY DISINTEGRATING ORAL EVERY 8 HOURS PRN
Qty: 20 TABLET | Refills: 0 | Status: SHIPPED | OUTPATIENT
Start: 2022-05-02 | End: 2022-07-14

## 2022-05-02 RX ORDER — DICYCLOMINE HCL 20 MG
20 TABLET ORAL 4 TIMES DAILY PRN
Qty: 40 TABLET | Refills: 0 | Status: SHIPPED | OUTPATIENT
Start: 2022-05-02 | End: 2022-07-14

## 2022-05-02 NOTE — TELEPHONE ENCOUNTER
Pt notified and she voiced understanding of directions. She will have some  get medication from the pharmacy and start that.

## 2022-05-02 NOTE — TELEPHONE ENCOUNTER
Viral gastroenteritis, commonly known as  the \"stomach flu\", can be expected to last about 2-3 days. Please try to drink fluids as you tolerate. Start with ice chips, water, popsicles, and Gatorade. As you are able to tolerate liquids, add clear soups, yogurt, bananas, rice, and toast or crackers. Slowly advance you diet as you are able. You may take over the counter Pepto Bismol or Imodium as you need to for diarrhea. Please be aware that Tony Barnett will turn your stools black. This is normal for this medication. You may also try adding over the counter Pepcid 20 mg for about 1 week. This will help to settle your stomach. zofran ([place])  and dicyclomine sent to pharmacy. zofran will help with nausea, dicyclomine will help with stomach cramps.

## 2022-05-02 NOTE — TELEPHONE ENCOUNTER
Pt woke up around 1am with Nausea, diarrhea and abdominal pain. She is sipping on water. Please advise. She was supposed to have an appt with Cardio this morning, unable to go.

## 2022-06-27 ENCOUNTER — OFFICE VISIT (OUTPATIENT)
Dept: INTERNAL MEDICINE CLINIC | Age: 72
End: 2022-06-27
Payer: MEDICARE

## 2022-06-27 VITALS
DIASTOLIC BLOOD PRESSURE: 70 MMHG | BODY MASS INDEX: 24.03 KG/M2 | HEART RATE: 84 BPM | SYSTOLIC BLOOD PRESSURE: 170 MMHG | OXYGEN SATURATION: 98 % | WEIGHT: 140 LBS

## 2022-06-27 DIAGNOSIS — H01.004 BLEPHARITIS OF LEFT UPPER EYELID, UNSPECIFIED TYPE: ICD-10-CM

## 2022-06-27 DIAGNOSIS — M76.71 PERONEAL TENDINITIS OF RIGHT LOWER EXTREMITY: Primary | ICD-10-CM

## 2022-06-27 PROCEDURE — 1090F PRES/ABSN URINE INCON ASSESS: CPT | Performed by: INTERNAL MEDICINE

## 2022-06-27 PROCEDURE — G8399 PT W/DXA RESULTS DOCUMENT: HCPCS | Performed by: INTERNAL MEDICINE

## 2022-06-27 PROCEDURE — 1036F TOBACCO NON-USER: CPT | Performed by: INTERNAL MEDICINE

## 2022-06-27 PROCEDURE — 1123F ACP DISCUSS/DSCN MKR DOCD: CPT | Performed by: INTERNAL MEDICINE

## 2022-06-27 PROCEDURE — 99214 OFFICE O/P EST MOD 30 MIN: CPT | Performed by: INTERNAL MEDICINE

## 2022-06-27 PROCEDURE — G8427 DOCREV CUR MEDS BY ELIG CLIN: HCPCS | Performed by: INTERNAL MEDICINE

## 2022-06-27 PROCEDURE — 3017F COLORECTAL CA SCREEN DOC REV: CPT | Performed by: INTERNAL MEDICINE

## 2022-06-27 PROCEDURE — G8420 CALC BMI NORM PARAMETERS: HCPCS | Performed by: INTERNAL MEDICINE

## 2022-06-27 RX ORDER — DOXYCYCLINE HYCLATE 100 MG
100 TABLET ORAL 2 TIMES DAILY
Qty: 20 TABLET | Refills: 0 | Status: SHIPPED | OUTPATIENT
Start: 2022-06-27 | End: 2022-07-07

## 2022-06-27 ASSESSMENT — ENCOUNTER SYMPTOMS: BACK PAIN: 1

## 2022-06-27 NOTE — PROGRESS NOTES
Radha Walsh (:  1950) is a 67 y.o. female,New patient, here for evaluation of the following chief complaint(s):  Leg Pain (x3 days intermittent Right. hx of teroid inj.cortisone caused high bs), Back Pain (c/o low back pain), Eye Problem (red swollen, itching x2 days. eye gtts ), and Leg Pain (pain 3/10 right now however did keep awake through the night R leg)         ASSESSMENT/PLAN:  1. Peroneal tendinitis of right lower extremity  -     diclofenac sodium (VOLTAREN) 1 % GEL; Apply 2 g topically 4 times daily, Topical, 4 TIMES DAILY Starting Mon 2022, Disp-100 g, R-0, Normal  2. Blepharitis of left upper eyelid, unspecified type  -     doxycycline hyclate (VIBRA-TABS) 100 MG tablet; Take 1 tablet by mouth 2 times daily for 10 days, Disp-20 tablet, R-0Normal  Patient presenting with a peroneal tendinitis of the right leg discussed with patient the potential etiologies for it including her shoulder and knee pain that she was having before at this point we will get her treated conservatively with heat and topical anti-inflammatory medication and exercises that would be given to the patient    Patient is having stye with blepharitis on the left upper eyelid discussed patient the best way to treat it and will get a start on antibiotics and give her some information about that    Return if symptoms worsen or fail to improve, for As scheduled.          Subjective   SUBJECTIVE/OBJECTIVE:    Lab Review   Lab Results   Component Value Date     2022     10/20/2021     2021    K 4.1 2022    K 4.2 10/20/2021    K 4.5 2021    K 4.1 2018    K 3.9 2018    CO2 22 2022    CO2 24 10/20/2021    CO2 24 2021    BUN 11 2022    BUN 17 10/20/2021    BUN 15 2021    CREATININE <0.5 2022    CREATININE 0.5 10/20/2021    CREATININE 0.6 2021    GLUCOSE 167 2022    GLUCOSE 84 10/20/2021    GLUCOSE 150 2021    CALCIUM 9.3 04/14/2022    CALCIUM 10.3 10/20/2021    CALCIUM 9.6 06/14/2021     Lab Results   Component Value Date    WBC 10.2 04/16/2021    WBC 4.2 08/20/2019    WBC 10.8 04/20/2018    HGB 13.4 04/16/2021    HGB 13.1 08/20/2019    HGB 14.5 04/20/2018    HCT 41.0 04/16/2021    HCT 38.7 08/20/2019    HCT 43.8 04/20/2018    MCV 92.3 04/16/2021    MCV 91.9 08/20/2019    MCV 91.9 04/20/2018     04/16/2021     08/20/2019     04/20/2018     Lab Results   Component Value Date    CHOL 139 04/14/2022    CHOL 143 06/14/2021    CHOL 144 08/04/2020    TRIG 194 04/14/2022    TRIG 220 06/14/2021    TRIG 157 08/04/2020    HDL 42 04/14/2022    HDL 48 06/14/2021    HDL 47 08/04/2020    HDL 49 09/27/2011    LDLDIRECT 107 04/21/2018    LDLDIRECT See Note 08/12/2013       Vitals 6/27/2022 6/27/2022 9/15/5189   SYSTOLIC 793 724 592   DIASTOLIC 70 90 60   Site - - -   Position - - -   Cuff Size - - -   Pulse - 84 -   Temp - - -   Resp - - -   SpO2 - 98 -   Weight - 140 lb -   Height - - -   Body mass index - - -   Some recent data might be hidden       Leg Pain   The incident occurred 3 to 5 days ago. The incident occurred at home. The pain is at a severity of 4/10. The pain has been fluctuating since onset. Pertinent negatives include no inability to bear weight, loss of motion, loss of sensation, muscle weakness, numbness or tingling. Back Pain  Associated symptoms include leg pain. Pertinent negatives include no numbness or tingling. Eye Problem   Pertinent negatives include no tingling. Review of Systems   Musculoskeletal: Positive for back pain. Neurological: Negative for tingling and numbness. Objective   Physical Exam  Vitals reviewed. Eyes:      General:         Right eye: No foreign body or discharge. Left eye: Hordeolum present. No foreign body or discharge. Musculoskeletal:      Right lower leg: No bony tenderness. Left lower leg: Tenderness present.  No swelling, deformity, lacerations or bony tenderness. No edema. Legs: This dictation was generated by voice recognition computer software. Although all attempts are made to edit the dictation for accuracy, there may be errors in the transcription that are not intended. An electronic signature was used to authenticate this note.     --Edward Mullins MD

## 2022-06-27 NOTE — PATIENT INSTRUCTIONS
Patient Education        Tendon Injury (Tendinopathy): Care Instructions  Your Care Instructions     Tendons are tough, flexible tissues that connect muscle to bone. A tendon can hurt or get torn from overuse or aging, especially tendons in the shoulder, elbow, wrist, hip, knee, or ankle. Tendon injuries may be called tendinopathy or tendinitis. Tendon injuries can occur from any motion you have to repeat jorge job, sports, or daily activities. Tennis elbow is one common tendon injury. You can treat most tendon problems with over-the-counter pain medicine, rest,changes in your activities, and physical therapy. Follow-up care is a key part of your treatment and safety. Be sure to make and go to all appointments, and call your doctor if you are having problems. It's also a good idea to know your test results and keep alist of the medicines you take. How can you care for yourself at home?  Rest the sore area. You may have to stop doing the activity that caused the tendon pain for a while.  Take an over-the-counter pain medicine, such as acetaminophen (Tylenol), ibuprofen (Advil, Motrin), or naproxen (Aleve). Read and follow all instructions on the label.  Do not take two or more pain medicines at the same time unless the doctor told you to. Many pain medicines have acetaminophen, which is Tylenol. Too much acetaminophen (Tylenol) can be harmful.  Put ice or a cold pack on the sore area for 10 to 20 minutes at a time. Try to do this every 1 to 2 hours for the next 3 days (when you are awake) or until any swelling goes down. Put a thin cloth between the ice and your skin.  Prop up the sore area on a pillow when you ice it or anytime you sit or lie down during the next 3 days. Try to keep it above the level of your heart. This will help reduce swelling.    Follow your doctor's advice for wearing and caring for a sling, splint, or cast. In some cases, you may wear one of these for a while to help your tendon heal.   Follow your doctor's advice for stretching and physical therapy. Gently move your joint through its full range of motion. This will prevent stiffness in your joint.  Go back to your activity slowly. Warm up before and stretch after the activity. You also can try making some changes. For example, if a sport caused your tendon pain, alternate the sport with another activity. If using a tool causes pain, switch hands or change your . Stop the activity if it hurts. After the activity, apply ice to prevent pain and swelling.  Do not smoke. Smoking can slow healing. If you need help quitting, talk to your doctor about stop-smoking programs and medicines. These can increase your chances of quitting for good. When should you call for help? Watch closely for changes in your health, and be sure to contact your doctor if:     Your pain gets worse.      You do not get better as expected. Where can you learn more? Go to https://Numeratepecesario.FreeGameCredits. org and sign in to your TeensSuccess account. Enter A157 in the ChosenList.com box to learn more about \"Tendon Injury (Tendinopathy): Care Instructions. \"     If you do not have an account, please click on the \"Sign Up Now\" link. Current as of: March 9, 2022               Content Version: 13.3  © 2006-2022 [employer]. Care instructions adapted under license by [facility] ([facility]). If you have questions about a medical condition or this instruction, always ask your healthcare professional. [place] [address] any warranty or liability for your use of this information. Patient Education        Cellulitis of the Eye: Care Instructions  Your Care Instructions     Cellulitis of the eye is an infection of the skin and tissues around the eye. It is also called preseptal cellulitis or periorbital cellulitis. It is usuallycaused by bacteria. This type of infection may happen after a sinus infection or a dental infection.  It could also happen after an insect bite or an injury to the face. It most often occurs where there is a break in the skin. Cellulitis of the eye can be very serious. It's important to treat it right away. If you do, it usually goes away without lasting problems. Medicine andhome treatment can help you get better. Follow-up care is a key part of your treatment and safety. Be sure to make and go to all appointments, and call your doctor if you are having problems. It's also a good idea to know your test results and keep alist of the medicines you take. How can you care for yourself at home?  Take your antibiotics as directed. Do not stop taking them just because you feel better. You need to take the full course of antibiotics.  Do not wear contact lenses unless your doctor tells you it is okay.  Put your head on pillows, and put a cool, damp cloth on your eye. This can reduce swelling and pain.  If your doctor recommends it, use a warm pack on your eye.  Keep the skin around your eye clean and dry.  Be safe with medicines. Read and follow all instructions on the label. ? If the doctor gave you a prescription medicine for pain, take it as prescribed. ? If you are not taking a prescription pain medicine, ask your doctor if you can take an over-the-counter medicine. To prevent cellulitis  [facility] your hands well after you use the bathroom and before and after you eat.  Do not rub or pick at the skin around your eyes. Cellulitis occurs most often where there is a break in the skin.  If you get a cut, pimple, or insect bite near your eye, clean the area as soon as you can. This can help prevent an infection. ? Wash the area with cool water and a mild soap, such as [place]. ? Do not use rubbing alcohol, hydrogen peroxide, iodine, or Mercurochrome. These can harm the tissues and slow healing.  Call your doctor if you have a sinus infection with redness or swelling of your eyes.   When should you call for help?   Call your doctor now or seek immediate medical care if:     You have new or worse signs of an eye infection, such as:  ? Pus or thick discharge coming from the eye.  ? Redness or swelling around the eye.  ? A fever.      Your eye seems to be bulging out.      You seem to be getting sicker.      You have vision changes. Watch closely for changes in your health, and be sure to contact your doctor if:     You do not get better as expected. Where can you learn more? Go to https://Modenuspepiceweb.Xercise4less. org and sign in to your [employer] account. Enter [phone] [id number] in the [facility] box to learn more about \"Cellulitis of the Eye: Care Instructions. \"     If you do not have an account, please click on the \"Sign Up Now\" link. Current as of: January 24, 2022               Content Version: 13.3  © 2006-2022 [employer]. Care instructions adapted under license by [facility] ([facility]). If you have questions about a medical condition or this instruction, always ask your healthcare professional. [place] [address] any warranty or liability for your use of this information. Patient Education        Blepharitis: Care Instructions  Overview     Blepharitis is an inflammation or infection of the eyelids. It causes dry, scaly crusts on the eyelids. It can also cause your eyes to itch, burn, and look red. This problem is more common in people who have rosacea, dandruff,skin allergies, or eczema. Home treatment can help you keep your eyes comfortable. Your doctor may alsoprescribe an ointment to put on your eyelids. Follow-up care is a key part of your treatment and safety. Be sure to make and go to all appointments, and call your doctor if you are having problems. It's also a good idea to know your test results and keep alist of the medicines you take. How can you care for yourself at home?  Wash your eyelids and eyebrows daily with baby shampoo.  To wash your eyelids:  ? Place a warm, wet washcloth over your eyes for about a minute. This will help soften and loosen the crusts on your eyelashes. ? Put a few drops of baby shampoo on a warm washcloth. ? Gently wipe your eyelids and lashes. This helps remove any crust. It also cleans your eyelids. ? Rinse well with water.  Use artificial tears eyedrops if your eyes are dry.  Avoid wearing contact lenses or eye makeup while your eyelids are healing.  Be safe with medicines. If your doctor prescribed medicine for you, use it exactly as directed. Call your doctor if you think you are having a problem with your medicine. When should you call for help? Call your doctor now or seek immediate medical care if:     You have signs of an eye infection, such as:  ? Pus or thick discharge coming from the eye.  ? Redness or swelling around the eye.  ? A fever. Watch closely for changes in your health, and be sure to contact your doctor if:     You have vision changes.      You do not get better as expected. Where can you learn more? Go to https://Netminingperoselineeweb.MiniTime. org and sign in to your Neo[employer] Systems account. Enter I645 in the [employer] box to learn more about \"Blepharitis: Care Instructions. \"     If you do not have an account, please click on the \"Sign Up Now\" link. Current as of: January 24, 2022               Content Version: 13.3  © 2006-2022 [employer]. Care instructions adapted under license by [facility] ([facility]). If you have questions about a medical condition or this instruction, always ask your healthcare professional. [place] [address] any warranty or liability for your use of this information. Patient Education        Peroneal Tendon Strain: Rehab Exercises  Introduction  Here are some examples of exercises for you to try. The exercises may be suggested for a condition or for rehabilitation. Start each exercise slowly. Ease off the exercises if you start to have pain. You will be told when to start these exercises and which ones will work bestfor you. How to do the exercises  Calf wall stretch (back knee straight)    1. Stand facing a wall with your hands on the wall at about eye level. Put your affected leg about a step behind your other leg. 2. Keeping your back leg straight and your back heel on the floor, bend your front knee and gently bring your hip and chest toward the wall until you feel a stretch in the calf of your back leg. 3. Hold the stretch for at least 15 to 30 seconds. 4. Repeat 2 to 4 times. Calf wall stretch (knees bent)    1. Stand facing a wall with your hands on the wall at about eye level. Put your affected leg about a step behind your other leg. 2. Keeping both heels on the floor, bend both knees. Then gently bring your hip and chest toward the wall until you feel a stretch in the calf of your back leg. 3. Hold the stretch for at least 15 to 30 seconds. 4. Repeat 2 to 4 times. Hamstring wall stretch    1. Lie on your back in a doorway, with your good leg through the open door. 2. Slide your affected leg up the wall to straighten your knee. You should feel a gentle stretch down the back of your leg. 3. Hold the stretch for at least 1 minute to begin. Then over time, try to lengthen the time you hold the stretch to as long as 6 minutes. 4. Repeat 2 to 4 times. 5. If you do not have a place to do this exercise in a doorway, there is another way to do it:  6. Lie on your back, and bend the knee of your affected leg. 7. Loop a towel under the ball and toes of that foot, and hold the ends of the towel in your hands. 8. Straighten your knee, and slowly pull back on the towel. You should feel a gentle stretch down the back of your leg. 9. Hold the stretch for 15 to 30 seconds. Or even better, hold the stretch for 1 minute if you can. 10. Repeat 2 to 4 times. 1. Do not arch your back. 2. Do not bend either knee.   3. Keep one heel touching the floor and the other heel touching the wall. Do not point your toes. Shin muscle stretch    1. Sit in a chair, with both feet flat on the floor. 2. Bend your affected leg behind you so that the top of your foot near your toes is flat on the floor and your toes are pointed away from your body. If you need to, you can hold on to the sides of the chair for support. 3. Hold the stretch for at least 15 to 30 seconds. You should feel a stretch in the front (shin) of your lower leg. 4. Repeat 2 to 4 times. Follow-up care is a key part of your treatment and safety. Be sure to make and go to all appointments, and call your doctor if you are having problems. It's also a good idea to know your test results and keep alist of the medicines you take. Where can you learn more? Go to https://"[employer]"peyetu.MedNews. org and sign in to your SMRxT account. Enter [id number] in the SIPX box to learn more about \"Peroneal Tendon Strain: Rehab Exercises. \"     If you do not have an account, please click on the \"Sign Up Now\" link. Current as of: March 9, 2022               Content Version: 13.3  © 2006-2022 Healthwise, Incorporated. Care instructions adapted under license by [facility] ([facility]). If you have questions about a medical condition or this instruction, always ask your healthcare professional. [place] [address] any warranty or liability for your use of this information.

## 2022-06-29 ENCOUNTER — TELEPHONE (OUTPATIENT)
Dept: INTERNAL MEDICINE CLINIC | Age: 72
End: 2022-06-29

## 2022-06-29 RX ORDER — CEFUROXIME AXETIL 500 MG/1
500 TABLET ORAL 2 TIMES DAILY
Qty: 20 TABLET | Refills: 0 | Status: SHIPPED | OUTPATIENT
Start: 2022-06-29 | End: 2022-07-09

## 2022-06-29 RX ORDER — CEPHALEXIN 500 MG/1
500 CAPSULE ORAL 3 TIMES DAILY
Qty: 21 CAPSULE | Refills: 0 | Status: SHIPPED | OUTPATIENT
Start: 2022-06-29 | End: 2022-07-06

## 2022-06-29 NOTE — TELEPHONE ENCOUNTER
Pt calling back ---she has never taken Ceftin before and it will cost her $30 and she is afraid she will have another reaction---please call the pt to see what it is she is wanting--thanks.

## 2022-06-29 NOTE — TELEPHONE ENCOUNTER
Dr. Estephania Rees I am not sure if you can advise on this for the patient. Dr. Trish Fofana is gone for the day. I can hold this for him to advise on if you prefer.

## 2022-06-29 NOTE — TELEPHONE ENCOUNTER
Pt calling about the Doxycycline --she took 2 yesterday one at 8am and one at 8 pm---she took one this morning at 8 and started vomitting---please call the pt cell [phone]. Thanks.

## 2022-07-12 ENCOUNTER — TELEPHONE (OUTPATIENT)
Dept: CARDIOLOGY CLINIC | Age: 72
End: 2022-07-12

## 2022-07-12 NOTE — TELEPHONE ENCOUNTER
Pt called she is having echo stress tomorrow and with BP med's before or after her Echo Stress test    [place]   [phone]

## 2022-07-13 NOTE — PROGRESS NOTES
release capsule Take 1 capsule by mouth 2 times daily 4/12/22   Canalou Hammersmith, DO   lisinopril (PRINIVIL;ZESTRIL) 20 MG tablet Take 1 tablet by mouth 2 times daily 4/12/22   Canalou Hammersmith, DO   metFORMIN (GLUCOPHAGE-XR) 500 MG extended release tablet Take 2 tablets by mouth 2 times daily 4/12/22   Pierre Hammersmith, DO   rosuvastatin (CRESTOR) 10 MG tablet TAKE ONE TABLET BY MOUTH ONCE NIGHTLY 4/12/22   Canalou Alanaersmith, DO   nitroGLYCERIN (NITROSTAT) 0.4 MG SL tablet Place 1 tablet under the tongue every 5 minutes as needed for Chest pain 4/12/22   Canalou Hammersmith, DO   glipiZIDE (GLUCOTROL XL) 2.5 MG extended release tablet Take 1 tablet by mouth 2 times daily (with meals) 4/12/22   Canalou Alanaersmith, DO   diclofenac sodium (VOLTAREN) 1 % GEL 2 grams applied to both hands QID prn; 4 grams applied to knees QID prn. 4/12/22   Canalou Alanaersmith, DO   blood glucose monitor strips Test daily and as needed for symptoms of irregular blood glucose. Dispense sufficient amount for indicated testing frequency plus additional to accommodate PRN testing needs. 11/11/21   Canalou Alanaersmith, DO   acetaminophen (TYLENOL) 500 MG tablet Take 500 mg by mouth every 6 hours as needed for Pain    Historical Provider, MD   blood glucose monitor strips Test 3 times a day & as needed for symptoms of irregular blood glucose. Dispense sufficient amount for indicated testing frequency plus additional to accommodate PRN testing needs. Which ever the insurance will cover.  2/12/21   Canalou Alanaersmith, DO   Elastic Bandages & Supports (CARPAL TUNNEL WRIST DELUXE) [address] 1 each by Does not apply route nightly 11/9/20   Pierre Presleymith, DO   aspirin EC 81 MG EC tablet Take 1 tablet by mouth daily 9/11/20   Effie Barnett MD   vitamin D3 (CHOLECALCIFEROL) 25 MCG (1000 UT) TABS tablet Take 1,000 Units by mouth daily    Historical Provider, MD   vitamin B-12 (CYANOCOBALAMIN) 1000 MCG tablet Take 1,000 mcg by mouth daily 1/15/20 Historical Provider, MD   Multiple Vitamins-Minerals (CENTRUM SILVER) TABS Take by mouth    Historical Provider, MD      Allergies:  Doxycycline; Asa arthritis strength-antacid [aspirin buff, al hyd-mg hyd]; Aspirin; Atorvastatin; Erythromycin base; Macrolides and ketolides; Troleandomycin; Verapamil; and Sulfa antibiotics     Review of Systems:   · Constitutional: there has been no unanticipated weight loss. There's been no change in energy level, sleep pattern, or activity level. · Eyes: No visual changes or diplopia. No scleral icterus. · ENT: No Headaches, hearing loss or vertigo. No mouth sores or sore throat. · Cardiovascular: Reviewed in HPI  · Respiratory: No cough or wheezing, no sputum production. No hematemesis. · Gastrointestinal: No abdominal pain, appetite loss, blood in stools. No change in bowel or bladder habits. · Genitourinary: No dysuria, trouble voiding, or hematuria. · Musculoskeletal:  No gait disturbance, weakness or joint complaints. +Back pain  · Integumentary: No rash or pruritis. · Neurological: No headache, diplopia, change in muscle strength, numbness or tingling. No change in gait, balance, coordination, mood, affect, memory, mentation, behavior. · Psychiatric: No anxiety, no depression. · Endocrine: No malaise, fatigue or temperature intolerance. No excessive thirst, fluid intake, or urination. No tremor. · Hematologic/Lymphatic: No abnormal bruising or bleeding, blood clots or swollen lymph nodes. · Allergic/Immunologic: No nasal congestion or hives. Physical Examination:    There were no vitals filed for this visit.      Constitutional and General Appearance: Appears stated age, NAD   Skin:good turgor,intact without lesions  HEENT: EOMI ,normal  Neck:no JVD    Respiratory:  · Normal excursion and expansion without use of accessory muscles  · Resp Auscultation: Normal breath sounds without dullness  Cardiovascular:  · The apical impulses not displaced  · Heart tones are crisp and normal  · Cervical veins are not engorged  · The carotid upstroke is normal in amplitude and contour without delay or bruit  · Peripheral pulses are symmetrical and full  · There is no clubbing, cyanosis of the extremities. · No edema  · Femoral Arteries: 2+ and equal  · Pedal Pulses: 2+ and equal   Abdomen:  · No masses or tenderness  · Liver/Spleen: No Abnormalities Noted  Neurological/Psychiatric:  · Alert and oriented in all spheres  · Moves all extremities well  · Exhibits normal gait balance and coordination  · No abnormalities of mood, affect, memory, mentation, or behavior are noted    Angiogram 4/20/18  Acute inferior posterior wall MI  Cath with 40-50% mid RCA,100% OM1,EF 50% with mild inferior wall hypokinesis. Trop 2.99.     PCI with 2.5 x 15 balloon then 2.5 x 18 manasa SHARIF stent to 16 hitesh with excellent angiographic result. Small distal branch with embolization. Will continue aaggrastat x 12 hours. Plavix given. She has been on crestor for lipid management,need to give in house  ECG-8/16/19 for chest pain  with borderline short CT interval otherwise normal-done for chest pain    Nuclear stress 3/4/19      Summary     Small sized lateral fixed defect of mild intensity consistent with     infarction in the territory of the mid and distal LCx .     Normal LV size and systolic function.           Assessment:   CAD (posterior wall MI April 2018)  Stable, no anginal symptoms. [facility] 4/20/18> SHARIF to OM1  Family history of premature heart disease. Had GI symptoms (upper abd pain) that radiated to neck and back with prior stenting    Hypertension  Controlled   There were no vitals taken for this visit.     Hyperlipidemia  Takes Crestor 10mg  8/4/20> , , HDL 47, LDL 66, ALT 18 AST 18   6/14/21> , , HDL 48, LDL 51, ALT 14 AST 15     Carotid dopplers 6/14/18 (Care Everywhere)> less than 50% mary  Abd u/s 6/3/17> Negative for aneurysm    Palpitations -risk for accessory pathway conduction  MCOT 9/24/19> SR/PAT - controlled   Intolerant to beta blockers  Remains on Diltiazem 240 mg BID      PLAN:  I reviewed cardiac testing and labs. Continue risk factor modifications. Call for any change in shortness of breath or development of chest pain with activity. Follow up in         I appreciate the opportunity of cooperating in the care of this individual.    Jamil Hernandez M.D., [facility]

## 2022-07-13 NOTE — PROGRESS NOTES
[address]   Cardiac f/up    Referring Provider:  Tasha Melendez DO     Chief Complaint   Patient presents with    Hypertension    Hyperlipidemia    Coronary Artery Disease    6 Month Follow-Up     w/ echo     History of Present Illness:  Mrs. Kia Ulloa is s/p acute posterior wall MI April 2018 (progressive exertional chest discomfort). LHC showed 100% OM1 which was stented. She is on Crestor for marked hyperlipidemia, has family history of premature heart disease. Intolerant to beta blockers. She feels she had COVID 1/2021. At our last visit, she reported elevated heart rates and blood pressures at home.     Today, she is here for follow up and she had a stress test.  She states she has not been exercising like she used to due to the Pandemic. Her exercise facility has not fully opened up again. Her stress echo showed an old area of scar tissue with good heart strength and this was discussed with patient. She is here with her . She feels good overall. She states her blood sugar has been running high as of late. Discussed new medication Jardiance and benefits for both diabetes and heart. She was agreeable to starting it and samples given. [facility] patient assistance paperwork started and she wanted to do some research into the program. Denies chest pain, shortness of breath, syncope, orthopnea, palpitations, or dizziness. Past Medical History:   has a past medical history of Allergic rhinitis, Fibromyalgia, GERD (gastroesophageal reflux disease), Heart attack ([address] Heart disease, Herniated disc, Hyperlipidemia, Hypertension, and Type II or unspecified type diabetes mellitus without mention of complication, not stated as uncontrolled. Surgical History:   has a past surgical history that includes Coronary angioplasty with stent (2018) and Hymenectomy. Social History:   reports that she has never smoked.  She has never used smokeless tobacco. She reports that she Christine Malone, DO   Elastic Bandages & Supports (CARPAL TUNNEL WRIST DELUXE) MISC 1 each by Does not apply route nightly 11/9/20  Yes Lollie Crigler, DO   aspirin EC 81 MG EC tablet Take 1 tablet by mouth daily 9/11/20  Yes Tigist Ridley MD   vitamin B-12 (CYANOCOBALAMIN) 1000 MCG tablet Take 1,000 mcg by mouth daily 1/15/20  Yes Historical Provider, MD   Multiple Vitamins-Minerals (CENTRUM SILVER) TABS Take by mouth   Yes Historical Provider, MD      Allergies:  Doxycycline; Asa arthritis strength-antacid [aspirin buff, al hyd-mg hyd]; Aspirin; Atorvastatin; Erythromycin base; Macrolides and ketolides; Troleandomycin; Verapamil; and Sulfa antibiotics     Review of Systems:   · Constitutional: there has been no unanticipated weight loss. There's been no change in energy level, sleep pattern, or activity level. · Eyes: No visual changes or diplopia. No scleral icterus. · ENT: No Headaches, hearing loss or vertigo. No mouth sores or sore throat. · Cardiovascular: Reviewed in HPI  · Respiratory: No cough or wheezing, no sputum production. No hematemesis. · Gastrointestinal: No abdominal pain, appetite loss, blood in stools. No change in bowel or bladder habits. · Genitourinary: No dysuria, trouble voiding, or hematuria. · Musculoskeletal:  No gait disturbance, weakness or joint complaints. +Back pain  · Integumentary: No rash or pruritis. · Neurological: No headache, diplopia, change in muscle strength, numbness or tingling. No change in gait, balance, coordination, mood, affect, memory, mentation, behavior. · Psychiatric: No anxiety, no depression. · Endocrine: No malaise, fatigue or temperature intolerance. No excessive thirst, fluid intake, or urination. No tremor. · Hematologic/Lymphatic: No abnormal bruising or bleeding, blood clots or swollen lymph nodes. · Allergic/Immunologic: No nasal congestion or hives.     Physical Examination:    Vitals:    07/14/22 1007   BP: (!) 130/48   Pulse:    SpO2: 2018)  Stable, no anginal symptoms. [facility] 4/20/18> SHARIF to OM1  Family history of premature heart disease. Had GI symptoms (upper abd pain) that radiated to neck and back with prior stenting  Stress echo today> EF preserved. Previous scar. Hypertension  Controlled   Blood pressure (!) 130/48, pulse 91, height 5' 4\" (1.626 m), weight 137 lb 6.4 oz (62.3 kg), SpO2 95 %. Hyperlipidemia  Takes Crestor 10mg  8/4/20> , , HDL 47, LDL 66, ALT 18 AST 18   6/14/21> , , HDL 48, LDL 51, ALT 14 AST 15   4/14/22> , HDL 42, LDL 58, . Well Controlled. Carotid dopplers 6/14/18 (Care Everywhere)> less than 50% mary  Abd u/s 6/3/17> Negative for aneurysm    Palpitations -risk for accessory pathway conduction  [facility] 9/24/19> SR/PAT - controlled   Intolerant to beta blockers  Remains on Diltiazem 240 mg BID      PLAN:  I reviewed cardiac testing and labs. Continue risk factor modifications. Call for any change in shortness of breath or development of chest pain with activity. Continue same medications plus start Jardiance. Start Jardiance 10mg. Take one tablet daily in the morning. Samples Given. Please be sure you keep your private area clean and dry. Keep a watch for yeast infections on this medications. Follow up in 6 months       I appreciate the opportunity of cooperating in the care of this individual.    Caterina Shabazz. Nate Purcell M.D., [address] attestation: This note was scribed in the presence of Dr. Nate Purcell MD, by Alisia Camilo RN     The scribe's documentation has been prepared under my direction and personally reviewed by me in its entirety. I confirm that the note above accurately reflects all work, treatment, procedures, and medical decision making performed by me.

## 2022-07-14 ENCOUNTER — HOSPITAL ENCOUNTER (OUTPATIENT)
Dept: NON INVASIVE DIAGNOSTICS | Age: 72
Discharge: HOME | End: 2022-07-14
Payer: MEDICARE

## 2022-07-14 ENCOUNTER — OFFICE VISIT (OUTPATIENT)
Dept: CARDIOLOGY CLINIC | Age: 72
End: 2022-07-14
Payer: MEDICARE

## 2022-07-14 VITALS
WEIGHT: 137.4 LBS | HEART RATE: 91 BPM | HEIGHT: 64 IN | SYSTOLIC BLOOD PRESSURE: 130 MMHG | OXYGEN SATURATION: 95 % | BODY MASS INDEX: 23.46 KG/M2 | DIASTOLIC BLOOD PRESSURE: 48 MMHG

## 2022-07-14 DIAGNOSIS — I25.10 CORONARY ARTERY DISEASE INVOLVING NATIVE CORONARY ARTERY OF NATIVE HEART WITHOUT ANGINA PECTORIS: Primary | ICD-10-CM

## 2022-07-14 DIAGNOSIS — I25.10 CORONARY ARTERY DISEASE INVOLVING NATIVE CORONARY ARTERY OF NATIVE HEART WITHOUT ANGINA PECTORIS: ICD-10-CM

## 2022-07-14 DIAGNOSIS — E78.5 HYPERLIPIDEMIA LDL GOAL <70: ICD-10-CM

## 2022-07-14 DIAGNOSIS — I10 PRIMARY HYPERTENSION: ICD-10-CM

## 2022-07-14 DIAGNOSIS — R00.2 PALPITATIONS: ICD-10-CM

## 2022-07-14 LAB
LV EF: 50 %
LVEF MODALITY: NORMAL

## 2022-07-14 PROCEDURE — G8420 CALC BMI NORM PARAMETERS: HCPCS | Performed by: INTERNAL MEDICINE

## 2022-07-14 PROCEDURE — 3017F COLORECTAL CA SCREEN DOC REV: CPT | Performed by: INTERNAL MEDICINE

## 2022-07-14 PROCEDURE — 1123F ACP DISCUSS/DSCN MKR DOCD: CPT | Performed by: INTERNAL MEDICINE

## 2022-07-14 PROCEDURE — 93320 DOPPLER ECHO COMPLETE: CPT

## 2022-07-14 PROCEDURE — G8427 DOCREV CUR MEDS BY ELIG CLIN: HCPCS | Performed by: INTERNAL MEDICINE

## 2022-07-14 PROCEDURE — G8399 PT W/DXA RESULTS DOCUMENT: HCPCS | Performed by: INTERNAL MEDICINE

## 2022-07-14 PROCEDURE — 99214 OFFICE O/P EST MOD 30 MIN: CPT | Performed by: INTERNAL MEDICINE

## 2022-07-14 PROCEDURE — 1090F PRES/ABSN URINE INCON ASSESS: CPT | Performed by: INTERNAL MEDICINE

## 2022-07-14 PROCEDURE — 1036F TOBACCO NON-USER: CPT | Performed by: INTERNAL MEDICINE

## 2022-07-14 PROCEDURE — 93351 STRESS TTE COMPLETE: CPT

## 2022-07-14 NOTE — PATIENT INSTRUCTIONS
Continue risk factor modifications. Call for any change in shortness of breath or development of chest pain with activity. Continue same medications plus start Jardiance. Start Jardiance 10mg. Take one tablet daily in the morning. Please be sure you keep your private area clean and dry. Keep a watch for yeast infections on this medications. Follow up in 6 months   Look over and Decide if you want to proceed with [facility] Cares paperwork for Jardiance.

## 2022-08-01 DIAGNOSIS — E78.5 HYPERLIPIDEMIA LDL GOAL <70: ICD-10-CM

## 2022-08-01 DIAGNOSIS — I25.2 HISTORY OF MI (MYOCARDIAL INFARCTION): ICD-10-CM

## 2022-08-01 DIAGNOSIS — E11.9 TYPE 2 DIABETES MELLITUS WITHOUT COMPLICATION, WITHOUT LONG-TERM CURRENT USE OF INSULIN (HCC): ICD-10-CM

## 2022-08-01 LAB
A/G RATIO: 1.6 (ref 1.1–2.2)
ALBUMIN SERPL-MCNC: 4.3 G/DL (ref 3.4–5)
ALP BLD-CCNC: 96 U/L (ref 40–129)
ALT SERPL-CCNC: 12 U/L (ref 10–40)
ANION GAP SERPL CALCULATED.3IONS-SCNC: 15 MMOL/L (ref 3–16)
AST SERPL-CCNC: 14 U/L (ref 15–37)
BILIRUB SERPL-MCNC: 0.4 MG/DL (ref 0–1)
BUN BLDV-MCNC: 9 MG/DL (ref 7–20)
CALCIUM SERPL-MCNC: 9.5 MG/DL (ref 8.3–10.6)
CHLORIDE BLD-SCNC: 101 MMOL/L (ref 99–110)
CHOLESTEROL, TOTAL: 141 MG/DL (ref 0–199)
CO2: 26 MMOL/L (ref 21–32)
CREAT SERPL-MCNC: 0.6 MG/DL (ref 0.6–1.2)
GFR AFRICAN AMERICAN: >60
GFR NON-AFRICAN AMERICAN: >60
GLUCOSE BLD-MCNC: 167 MG/DL (ref 70–99)
HDLC SERPL-MCNC: 43 MG/DL (ref 40–60)
LDL CHOLESTEROL CALCULATED: 61 MG/DL
POTASSIUM SERPL-SCNC: 4.9 MMOL/L (ref 3.5–5.1)
SODIUM BLD-SCNC: 142 MMOL/L (ref 136–145)
TOTAL PROTEIN: 7 G/DL (ref 6.4–8.2)
TRIGL SERPL-MCNC: 187 MG/DL (ref 0–150)
VLDLC SERPL CALC-MCNC: 37 MG/DL

## 2022-08-02 LAB
ESTIMATED AVERAGE GLUCOSE: 165.7 MG/DL
HBA1C MFR BLD: 7.4 %

## 2022-08-12 ENCOUNTER — OFFICE VISIT (OUTPATIENT)
Dept: INTERNAL MEDICINE CLINIC | Age: 72
End: 2022-08-12
Payer: MEDICARE

## 2022-08-12 VITALS
OXYGEN SATURATION: 99 % | BODY MASS INDEX: 23.76 KG/M2 | WEIGHT: 138.4 LBS | HEART RATE: 76 BPM | DIASTOLIC BLOOD PRESSURE: 58 MMHG | SYSTOLIC BLOOD PRESSURE: 144 MMHG

## 2022-08-12 VITALS
DIASTOLIC BLOOD PRESSURE: 58 MMHG | HEIGHT: 64 IN | OXYGEN SATURATION: 99 % | SYSTOLIC BLOOD PRESSURE: 144 MMHG | WEIGHT: 138 LBS | BODY MASS INDEX: 23.56 KG/M2 | HEART RATE: 76 BPM

## 2022-08-12 DIAGNOSIS — R13.10 FOOD STICKS ON SWALLOWING: ICD-10-CM

## 2022-08-12 DIAGNOSIS — R35.0 FREQUENT URINATION: ICD-10-CM

## 2022-08-12 DIAGNOSIS — I25.2 HISTORY OF MI (MYOCARDIAL INFARCTION): ICD-10-CM

## 2022-08-12 DIAGNOSIS — E78.5 HYPERLIPIDEMIA LDL GOAL <70: ICD-10-CM

## 2022-08-12 DIAGNOSIS — I10 PRIMARY HYPERTENSION: ICD-10-CM

## 2022-08-12 DIAGNOSIS — Z86.16 HISTORY OF COVID-19: ICD-10-CM

## 2022-08-12 DIAGNOSIS — Z12.11 COLON CANCER SCREENING: ICD-10-CM

## 2022-08-12 DIAGNOSIS — Z00.00 MEDICARE ANNUAL WELLNESS VISIT, SUBSEQUENT: Primary | ICD-10-CM

## 2022-08-12 DIAGNOSIS — E11.9 TYPE 2 DIABETES MELLITUS WITHOUT COMPLICATION, WITHOUT LONG-TERM CURRENT USE OF INSULIN (HCC): Primary | ICD-10-CM

## 2022-08-12 PROBLEM — M35.00 SICCA, UNSPECIFIED TYPE (HCC): Status: RESOLVED | Noted: 2022-08-12 | Resolved: 2022-08-12

## 2022-08-12 PROBLEM — M35.00 SICCA, UNSPECIFIED TYPE (HCC): Status: ACTIVE | Noted: 2022-08-12

## 2022-08-12 PROBLEM — R68.2 DRY MOUTH: Status: RESOLVED | Noted: 2021-10-23 | Resolved: 2022-08-12

## 2022-08-12 LAB
BILIRUBIN, POC: NORMAL
BLOOD URINE, POC: NORMAL
CLARITY, POC: NORMAL
COLOR, POC: NORMAL
GLUCOSE URINE, POC: NORMAL
KETONES, POC: NORMAL
LEUKOCYTE EST, POC: NORMAL
NITRITE, POC: NORMAL
PH, POC: 6
PROTEIN, POC: NORMAL
SPECIFIC GRAVITY, POC: 1
UROBILINOGEN, POC: 0.2

## 2022-08-12 PROCEDURE — 3051F HG A1C>EQUAL 7.0%<8.0%: CPT | Performed by: INTERNAL MEDICINE

## 2022-08-12 PROCEDURE — 1123F ACP DISCUSS/DSCN MKR DOCD: CPT | Performed by: INTERNAL MEDICINE

## 2022-08-12 PROCEDURE — G8420 CALC BMI NORM PARAMETERS: HCPCS | Performed by: INTERNAL MEDICINE

## 2022-08-12 PROCEDURE — G8427 DOCREV CUR MEDS BY ELIG CLIN: HCPCS | Performed by: INTERNAL MEDICINE

## 2022-08-12 PROCEDURE — 3017F COLORECTAL CA SCREEN DOC REV: CPT | Performed by: INTERNAL MEDICINE

## 2022-08-12 PROCEDURE — 1036F TOBACCO NON-USER: CPT | Performed by: INTERNAL MEDICINE

## 2022-08-12 PROCEDURE — 2022F DILAT RTA XM EVC RTNOPTHY: CPT | Performed by: INTERNAL MEDICINE

## 2022-08-12 PROCEDURE — G0439 PPPS, SUBSEQ VISIT: HCPCS | Performed by: INTERNAL MEDICINE

## 2022-08-12 PROCEDURE — 99214 OFFICE O/P EST MOD 30 MIN: CPT | Performed by: INTERNAL MEDICINE

## 2022-08-12 PROCEDURE — 1090F PRES/ABSN URINE INCON ASSESS: CPT | Performed by: INTERNAL MEDICINE

## 2022-08-12 PROCEDURE — G8399 PT W/DXA RESULTS DOCUMENT: HCPCS | Performed by: INTERNAL MEDICINE

## 2022-08-12 PROCEDURE — 81002 URINALYSIS NONAUTO W/O SCOPE: CPT | Performed by: INTERNAL MEDICINE

## 2022-08-12 RX ORDER — METFORMIN HYDROCHLORIDE 500 MG/1
1500 TABLET, EXTENDED RELEASE ORAL
Qty: 270 TABLET | Refills: 3 | Status: SHIPPED | OUTPATIENT
Start: 2022-08-12

## 2022-08-12 ASSESSMENT — PATIENT HEALTH QUESTIONNAIRE - PHQ9
SUM OF ALL RESPONSES TO PHQ QUESTIONS 1-9: 0
1. LITTLE INTEREST OR PLEASURE IN DOING THINGS: 0
SUM OF ALL RESPONSES TO PHQ QUESTIONS 1-9: 0
2. FEELING DOWN, DEPRESSED OR HOPELESS: 0
SUM OF ALL RESPONSES TO PHQ QUESTIONS 1-9: 0
SUM OF ALL RESPONSES TO PHQ QUESTIONS 1-9: 0
SUM OF ALL RESPONSES TO PHQ9 QUESTIONS 1 & 2: 0

## 2022-08-12 ASSESSMENT — LIFESTYLE VARIABLES
HOW OFTEN DO YOU HAVE A DRINK CONTAINING ALCOHOL: NEVER
HOW MANY STANDARD DRINKS CONTAINING ALCOHOL DO YOU HAVE ON A TYPICAL DAY: PATIENT DOES NOT DRINK

## 2022-08-12 NOTE — PROGRESS NOTES
Allergies   Allergen Reactions    Doxycycline Nausea And Vomiting    Asa Arthritis Strength-Antacid [Aspirin Buff, Al Hyd-Mg Hyd] Nausea Only     Per patient stopped taking because it upset her stomach. She denies itching, swelling, rash. Aspirin      Other reaction(s): GI Upset    Atorvastatin      Other reaction(s): Muscle Aches    Erythromycin Base Hives    Macrolides And Ketolides     Troleandomycin     Verapamil      Other reaction(s): Other (See Comments)  Low heart rate     Sulfa Antibiotics Rash     Prior to Visit Medications    Medication Sig Taking? Authorizing Provider   metFORMIN (GLUCOPHAGE-XR) 500 MG extended release tablet Take 3 tablets by mouth daily (with breakfast)  [place], DO   VITAMIN D PO Take by mouth  Historical Provider, MD   dilTIAZem (TIAZAC) 240 MG extended release capsule Take 1 capsule by mouth 2 times daily  [place] Colusa, DO   lisinopril (PRINIVIL;ZESTRIL) 20 MG tablet Take 1 tablet by mouth 2 times daily  Aloha Colusa, DO   rosuvastatin (CRESTOR) 10 MG tablet TAKE ONE TABLET BY MOUTH ONCE NIGHTLY  [place] Col[place], DO   nitroGLYCERIN (NITROSTAT) 0.4 MG SL tablet Place 1 tablet under the tongue every 5 minutes as needed for Chest pain  Aloha Colusa, DO   glipiZIDE (GLUCOTROL XL) 2.5 MG extended release tablet Take 1 tablet by mouth 2 times daily (with meals)  Aloha Colusa, DO   diclofenac sodium (VOLTAREN) 1 % GEL 2 grams applied to both hands QID prn; 4 grams applied to knees QID prn. Aloha Colusa, DO   blood glucose monitor strips Test daily and as needed for symptoms of irregular blood glucose. Dispense sufficient amount for indicated testing frequency plus additional to accommodate PRN testing needs.   [place] Colusa, DO   acetaminophen (TYLENOL) 500 MG tablet Take 500 mg by mouth every 6 hours as needed for Pain  Historical Provider, MD   blood glucose monitor strips Test 3 times a day & as needed for symptoms of irregular blood glucose. Dispense sufficient amount for indicated testing frequency plus additional to accommodate PRN testing needs. Which ever the insurance will cover. Jannette Pereira DO   Elastic Bandages & Supports (CARPAL TUNNEL WRIST DELUXE) [address] 1 each by Does not apply route nightly  Jannette Pereira DO   aspirin EC 81 MG EC tablet Take 1 tablet by mouth daily  Brant Goins MD   vitamin B-12 (CYANOCOBALAMIN) 1000 MCG tablet Take 1,000 mcg by mouth daily  Historical Provider, MD   Multiple Vitamins-Minerals (CENTRUM SILVER) TABS Take by mouth  Historical Provider, MD Ferrer (Including outside providers/suppliers regularly involved in providing care):   Patient Care Team:  Jannette Pereira DO as PCP - General (Internal Medicine)  Jannette Pereira DO as PCP - [facility]     Reviewed and updated this visit:  Tobacco  Allergies  Meds  Med Hx  Surg Hx  Soc Hx  Fam Hx            I, Min Ariza RN, 8/12/2022, performed the documented evaluation under the direct supervision of the attending physician. This encounter was performed under myShanel DOs, direct supervision, 8/12/2022.

## 2022-08-12 NOTE — PROGRESS NOTES
Patient: Carmen Tillman is a 67 y.o. female who presents today with the following Chief Complaint(s):  Chief Complaint   Patient presents with    Check-Up    Urinary Tract Infection     Frequent urination and burning       HPI    Here today for follow up. Is worried about her hearing. Notices that she has trouble hearing when people are wearing masks. Is wondering if it may be related to wax production. Has been having some issues with urinary pain and hesitancy. Wondering if she may have passed some kidney stones. Did not see any stones in her urine. Does think that she passed a kidney stone in the past.   Abdominal pain- suprapubic, crampy. Also pain in b/l low back. Pain is better now. DM- has been having diarrhea with metformin. Has decreased from 1000 mg BID to 1500 mg qd and diarrhea is much better. Will sometimes feel \"iffy\" with her blood sugars. Sugars running 140-180 in the mornings, lower in the afternoons. No low sugars. Cardiology wanted to start her on Jardiance but she is worried about the cost. Did give her patient assistance forms but she does not want to fill it out as she feels that the questions are too personal.     HLD- doing well on Crestor. HTN-  BP was well controlled at cardiology office. Does not monitor her BP at home. Would like to have a colonoscopy and EGD. Feels like she needs an EGD as food has been sticking in her esophagus. Has had in the past, not currently as bad as it was. Is worried about loss of muscle mass. Has not exercised since MI in 2018. Was walking 3 miles per day.        Results for orders placed or performed in visit on 08/12/22   Culture, Urine    Specimen: Urine, clean catch   Result Value Ref Range    Urine Culture, Routine No growth at 18 to 36 hours    POCT Urinalysis no Micro   Result Value Ref Range    Color, UA      Clarity, UA      Glucose, UA POC neg     Bilirubin, UA neg     Ketones, UA neg     Spec Grav, UA 1.005     Blood, UA POC moderate     pH, UA 6.0     Protein, UA POC neg     Urobilinogen, UA 0.2     Leukocytes, UA neg     Nitrite, UA neg           Allergies   Allergen Reactions    Doxycycline Nausea And Vomiting    Asa Arthritis Strength-Antacid [Aspirin Buff, Al Hyd-Mg Hyd] Nausea Only     Per patient stopped taking because it upset her stomach. She denies itching, swelling, rash. Aspirin      Other reaction(s): GI Upset    Atorvastatin      Other reaction(s): Muscle Aches    Erythromycin Base Hives    Macrolides And Ketolides     Troleandomycin     Verapamil      Other reaction(s):  Other (See Comments)  Low heart rate     Sulfa Antibiotics Rash      Past Medical History:   Diagnosis Date    Allergic rhinitis     Fibromyalgia     GERD (gastroesophageal reflux disease)     Heart attack ([facility] [address]     Heart disease     Herniated disc     Hyperlipidemia     Hypertension     Type II or unspecified type diabetes mellitus without mention of complication, not stated as uncontrolled       Past Surgical History:   Procedure Laterality Date    CORONARY ANGIOPLASTY WITH STENT PLACEMENT  2018    HYMENECTOMY        Social History     Socioeconomic History    Marital status:      Spouse name: Not on file    Number of children: Not on file    Years of education: Not on file    Highest education level: Not on file   Occupational History    Not on file   Tobacco Use    Smoking status: Never    Smokeless tobacco: Never   Vaping Use    Vaping Use: Never used   Substance and Sexual Activity    Alcohol use: No    Drug use: No    Sexual activity: Not on file     Comment:    Other Topics Concern    Not on file   Social History Narrative    Not on file     Social Determinants of Health     Financial Resource Strain: Not on file   Food Insecurity: Not on file   Transportation Needs: Not on file   Physical Activity: Insufficiently Active    Days of Exercise per Week: 1 day    Minutes of Exercise per Session: 30 min   Stress: Not on file Social Connections: Not on file   Intimate Partner Violence: Not on file   Housing Stability: Not on file     Family History   Problem Relation Age of Onset    Heart Disease Mother     Cancer Father         ? lymphoma ( when patient was very young)    Diabetes Sister     Thyroid Disease Daughter         Outpatient Medications Prior to Visit   Medication Sig Dispense Refill    VITAMIN D PO Take by mouth      dilTIAZem (TIAZAC) 240 MG extended release capsule Take 1 capsule by mouth 2 times daily 180 capsule 3    lisinopril (PRINIVIL;ZESTRIL) 20 MG tablet Take 1 tablet by mouth 2 times daily 180 tablet 3    rosuvastatin (CRESTOR) 10 MG tablet TAKE ONE TABLET BY MOUTH ONCE NIGHTLY 90 tablet 3    nitroGLYCERIN (NITROSTAT) 0.4 MG SL tablet Place 1 tablet under the tongue every 5 minutes as needed for Chest pain 25 tablet 2    glipiZIDE (GLUCOTROL XL) 2.5 MG extended release tablet Take 1 tablet by mouth 2 times daily (with meals) 60 tablet 5    diclofenac sodium (VOLTAREN) 1 % GEL 2 grams applied to both hands QID prn; 4 grams applied to knees QID prn. 150 g 3    blood glucose monitor strips Test daily and as needed for symptoms of irregular blood glucose. Dispense sufficient amount for indicated testing frequency plus additional to accommodate PRN testing needs. 100 strip 3    acetaminophen (TYLENOL) 500 MG tablet Take 500 mg by mouth every 6 hours as needed for Pain      blood glucose monitor strips Test 3 times a day & as needed for symptoms of irregular blood glucose. Dispense sufficient amount for indicated testing frequency plus additional to accommodate PRN testing needs. Which ever the insurance will cover.  100 strip 3    Elastic Bandages & Supports (CARPAL TUNNEL WRIST DELUXE) MISC 1 each by Does not apply route nightly 1 each 0    aspirin EC 81 MG EC tablet Take 1 tablet by mouth daily 90 tablet 1    vitamin B-12 (CYANOCOBALAMIN) 1000 MCG tablet Take 1,000 mcg by mouth daily      Multiple Vitamins-Minerals (CENTRUM SILVER) TABS Take by mouth      empagliflozin (JARDIANCE) 10 MG tablet Take 1 tablet by mouth daily 30 tablet 0    metFORMIN (GLUCOPHAGE-XR) 500 MG extended release tablet Take 2 tablets by mouth 2 times daily 360 tablet 3     No facility-administered medications prior to visit. Patient'spast medical history, surgical history, family history, medications,  and allergies  were all reviewed and updated as appropriate today. Review of Systems    BP (!) 144/58 (Site: Left Upper Arm, Position: Sitting, Cuff Size: Medium Adult)   Pulse 76   Wt 138 lb 6.4 oz (62.8 kg)   SpO2 99%   BMI 23.76 kg/m²   Physical Exam  Vitals and nursing note reviewed. Constitutional:       Appearance: She is well-developed. She is not toxic-appearing. HENT:      Head: Normocephalic. Right Ear: Tympanic membrane, ear canal and external ear normal.      Left Ear: Tympanic membrane, ear canal and external ear normal.      Mouth/Throat:      Pharynx: No oropharyngeal exudate or posterior oropharyngeal erythema. Eyes:      General: No scleral icterus. Extraocular Movements: Extraocular movements intact. Conjunctiva/sclera: Conjunctivae normal.      Pupils: Pupils are equal, round, and reactive to light. Neck:      Thyroid: No thyroid mass or thyromegaly. Vascular: No carotid bruit. Cardiovascular:      Rate and Rhythm: Normal rate and regular rhythm. Pulses:           Dorsalis pedis pulses are 2+ on the right side and 2+ on the left side. Posterior tibial pulses are 2+ on the right side and 2+ on the left side. Heart sounds: Normal heart sounds. No murmur heard. Pulmonary:      Effort: Pulmonary effort is normal.      Breath sounds: Normal breath sounds. Musculoskeletal:      Right lower leg: No edema. Left lower leg: No edema. Right foot: Normal range of motion. No deformity, bunion, Charcot foot, foot drop or prominent metatarsal heads.       Left foot: Normal range of motion. No deformity, bunion, Charcot foot, foot drop or prominent metatarsal heads. Feet:      Right foot:      Protective Sensation: 10 sites tested. 10 sites sensed. Skin integrity: Skin integrity normal.      Toenail Condition: Right toenails are normal.      Left foot:      Protective Sensation: 10 sites tested. 10 sites sensed. Skin integrity: Skin integrity normal.      Toenail Condition: Left toenails are normal.   Lymphadenopathy:      Cervical: No cervical adenopathy. Neurological:      General: No focal deficit present. Mental Status: She is alert and oriented to person, place, and time. Psychiatric:         Mood and Affect: Mood normal.         Behavior: Behavior normal. Behavior is cooperative. ASSESSMENT/PLAN:    Problem List Items Addressed This Visit       Food sticks on swallowing     Refer to GI for EGD. Relevant Orders    [facility] - Katina Krause MD, Gastroenterology, [facility]    History of COVID-19      Recovered. History of MI (myocardial infarction)     Status post MI in April 2018 that was treated with PTCA and stenting. Follows with Dr. Imani Alfredo. On ASA, lisinopril 40 mg qd, diltiazem  mg mg twice daily, and Crestor 10 mg qd with NTG SL prn. Sx of CAD were NULL and stomach pain. HTN (hypertension)      Above goal but patient states blood pressure is typically better controlled outside of the office. Continue diltiazem  mg twice daily and lisinopril 20 mg twice daily. Relevant Orders    Comprehensive Metabolic Panel    CBC with Auto Differential    Hyperlipidemia LDL goal <70      Well-controlled. Continue Crestor 10 mg daily. Type 2 diabetes mellitus without complication, without long-term current use of insulin ([address] - Primary      Patient did not tolerate metformin ER 1000 mg twice daily, but is feeling better on 1500 mg daily. Continue metformin ER 1500 mg daily.   Continue glipizide testing frequency plus additional to accommodate PRN testing needs. Which ever the insurance will cover. 100 strip 3    Elastic Bandages & Supports (CARPAL TUNNEL WRIST DELUXE) MISC 1 each by Does not apply route nightly 1 each 0    aspirin EC 81 MG EC tablet Take 1 tablet by mouth daily 90 tablet 1    vitamin B-12 (CYANOCOBALAMIN) 1000 MCG tablet Take 1,000 mcg by mouth daily      Multiple Vitamins-Minerals (CENTRUM SILVER) TABS Take by mouth       No current facility-administered medications for this visit. Return in about 4 months (around 12/12/2022) for labs prior.

## 2022-08-12 NOTE — PATIENT INSTRUCTIONS
Personalized Preventive Plan for Emili Mendez - 8/12/2022  Medicare offers a range of preventive health benefits. Some of the tests and screenings are paid in full while other may be subject to a deductible, co-insurance, and/or copay. Some of these benefits include a comprehensive review of your medical history including lifestyle, illnesses that may run in your family, and various assessments and screenings as appropriate. After reviewing your medical record and screening and assessments performed today your provider may have ordered immunizations, labs, imaging, and/or referrals for you. A list of these orders (if applicable) as well as your Preventive Care list are included within your After Visit Summary for your review. Other Preventive Recommendations:    A preventive eye exam performed by an eye specialist is recommended every 1-2 years to screen for glaucoma; cataracts, macular degeneration, and other eye disorders. A preventive dental visit is recommended every 6 months. Try to get at least 150 minutes of exercise per week or 10,000 steps per day on a pedometer . Order or download the FREE \"Exercise & Physical Activity: Your Everyday Guide\" from [employer]. Call 0-307.560.3262 or search [employer] online. You need 0313-6717 mg of calcium and 5899-3387 IU of vitamin D per day. It is possible to meet your calcium requirement with diet alone, but a vitamin D supplement is usually necessary to meet this goal.  When exposed to the sun, use a sunscreen that protects against both UVA and UVB radiation with an SPF of 30 or greater. Reapply every 2 to 3 hours or after sweating, drying off with a towel, or swimming. Always wear a seat belt when traveling in a car. Always wear a helmet when riding a bicycle or motorcycle.

## 2022-08-14 DIAGNOSIS — R31.29 OTHER MICROSCOPIC HEMATURIA: Primary | ICD-10-CM

## 2022-08-14 LAB — URINE CULTURE, ROUTINE: NORMAL

## 2022-08-15 NOTE — ASSESSMENT & PLAN NOTE
Above goal but patient states blood pressure is typically better controlled outside of the office. Continue diltiazem  mg twice daily and lisinopril 20 mg twice daily.

## 2022-08-15 NOTE — ASSESSMENT & PLAN NOTE
I reviewed the letter, it is not a denial  I submitted PA via CMM and am awaiting determination  I called [employer]  They had our old fax #  They updated our fax # in their records  They use [facility]  I called [facility] and spoke with Nathen Garnett who will be faxing results for 3/22 & 5/24 ASAP  Patient did not tolerate metformin ER 1000 mg twice daily, but is feeling better on 1500 mg daily. Continue metformin ER 1500 mg daily. Continue glipizide ER 2.5 mg twice daily with meals. She was not able to afford Jardiance. Encourage patient to increase exercise.

## 2022-08-15 NOTE — ASSESSMENT & PLAN NOTE
Status post MI in April 2018 that was treated with PTCA and stenting. Follows with Dr. Jenna Sheikh. On ASA, lisinopril 40 mg qd, diltiazem  mg mg twice daily, and Crestor 10 mg qd with NTG SL prn. Sx of CAD were NULL and stomach pain.

## 2022-08-17 ENCOUNTER — HOSPITAL ENCOUNTER (OUTPATIENT)
Age: 72
Discharge: HOME | End: 2022-08-17
Payer: MEDICARE

## 2022-08-17 ENCOUNTER — HOSPITAL ENCOUNTER (OUTPATIENT)
Dept: GENERAL RADIOLOGY | Age: 72
Discharge: HOME | End: 2022-08-17
Payer: MEDICARE

## 2022-08-17 DIAGNOSIS — R31.29 OTHER MICROSCOPIC HEMATURIA: ICD-10-CM

## 2022-08-17 PROCEDURE — 74018 RADEX ABDOMEN 1 VIEW: CPT

## 2022-08-21 DIAGNOSIS — R31.29 OTHER MICROSCOPIC HEMATURIA: Primary | ICD-10-CM

## 2022-08-26 DIAGNOSIS — R31.29 OTHER MICROSCOPIC HEMATURIA: ICD-10-CM

## 2022-08-26 LAB
BACTERIA: NORMAL /HPF
BILIRUBIN URINE: NEGATIVE
BLOOD, URINE: ABNORMAL
CLARITY: CLEAR
COLOR: YELLOW
EPITHELIAL CELLS, UA: 1 /HPF (ref 0–5)
GLUCOSE URINE: NEGATIVE MG/DL
HYALINE CASTS: 0 /LPF (ref 0–8)
KETONES, URINE: NEGATIVE MG/DL
LEUKOCYTE ESTERASE, URINE: NEGATIVE
MICROSCOPIC EXAMINATION: YES
NITRITE, URINE: NEGATIVE
PH UA: 6 (ref 5–8)
PROTEIN UA: NEGATIVE MG/DL
RBC UA: 1 /HPF (ref 0–4)
SPECIFIC GRAVITY UA: 1.01 (ref 1–1.03)
URINE TYPE: ABNORMAL
UROBILINOGEN, URINE: 0.2 E.U./DL
WBC UA: 1 /HPF (ref 0–5)

## 2022-10-07 ENCOUNTER — OFFICE VISIT (OUTPATIENT)
Dept: INTERNAL MEDICINE CLINIC | Age: 72
End: 2022-10-07
Payer: MEDICARE

## 2022-10-07 VITALS
HEIGHT: 64 IN | DIASTOLIC BLOOD PRESSURE: 80 MMHG | OXYGEN SATURATION: 97 % | WEIGHT: 140 LBS | SYSTOLIC BLOOD PRESSURE: 136 MMHG | HEART RATE: 74 BPM | BODY MASS INDEX: 23.9 KG/M2

## 2022-10-07 DIAGNOSIS — M54.12 CERVICAL RADICULAR PAIN: Primary | ICD-10-CM

## 2022-10-07 DIAGNOSIS — I10 PRIMARY HYPERTENSION: ICD-10-CM

## 2022-10-07 PROCEDURE — G8427 DOCREV CUR MEDS BY ELIG CLIN: HCPCS | Performed by: INTERNAL MEDICINE

## 2022-10-07 PROCEDURE — 99214 OFFICE O/P EST MOD 30 MIN: CPT | Performed by: INTERNAL MEDICINE

## 2022-10-07 PROCEDURE — 1036F TOBACCO NON-USER: CPT | Performed by: INTERNAL MEDICINE

## 2022-10-07 PROCEDURE — G8484 FLU IMMUNIZE NO ADMIN: HCPCS | Performed by: INTERNAL MEDICINE

## 2022-10-07 PROCEDURE — G8399 PT W/DXA RESULTS DOCUMENT: HCPCS | Performed by: INTERNAL MEDICINE

## 2022-10-07 PROCEDURE — G8420 CALC BMI NORM PARAMETERS: HCPCS | Performed by: INTERNAL MEDICINE

## 2022-10-07 PROCEDURE — 1090F PRES/ABSN URINE INCON ASSESS: CPT | Performed by: INTERNAL MEDICINE

## 2022-10-07 PROCEDURE — 3017F COLORECTAL CA SCREEN DOC REV: CPT | Performed by: INTERNAL MEDICINE

## 2022-10-07 PROCEDURE — 1123F ACP DISCUSS/DSCN MKR DOCD: CPT | Performed by: INTERNAL MEDICINE

## 2022-10-07 RX ORDER — PREDNISONE 10 MG/1
10 TABLET ORAL 2 TIMES DAILY
Qty: 10 TABLET | Refills: 0 | Status: SHIPPED | OUTPATIENT
Start: 2022-10-07 | End: 2022-10-12

## 2022-10-07 RX ORDER — TIZANIDINE 2 MG/1
2 TABLET ORAL NIGHTLY PRN
Qty: 30 TABLET | Refills: 0 | Status: SHIPPED | OUTPATIENT
Start: 2022-10-07

## 2022-10-07 ASSESSMENT — ENCOUNTER SYMPTOMS
ORTHOPNEA: 0
BLURRED VISION: 0
SHORTNESS OF BREATH: 0
BACK PAIN: 1

## 2022-10-07 NOTE — PROGRESS NOTES
Baljeet Armstrong (:  1950) is a 67 y.o. female,Established patient, here for evaluation of the following chief complaint(s):  Back Pain (Around shoulder blades L side )         ASSESSMENT/PLAN:  1. Cervical radicular pain  -     predniSONE (DELTASONE) 10 MG tablet; Take 1 tablet by mouth 2 times daily for 5 days, Disp-10 tablet, R-0Normal  -     tiZANidine (ZANAFLEX) 2 MG tablet; Take 1 tablet by mouth nightly as needed (muscle spasms), Disp-30 tablet, R-0Normal  2. Primary hypertension  At this point the patient findings are consistent with cervical radicular pain the patient has been instructed on some exercises today we had the physical therapist shadowing which was very helpful in giving the patient some instructions with any use also anti-inflammatory and heat treatment and see if that will improve as well as given her instructions on the best way to sleep    Patient blood pressure is elevated but this most likely related to her discomfort and pain after she rested it did came back to normal range we will continue patient the same plan and reassess it when she comes back to see her primary  Return if symptoms worsen or fail to improve, for As scheduled.          Subjective   SUBJECTIVE/OBJECTIVE:    Lab Review   Lab Results   Component Value Date/Time     2022 09:53 AM     2022 08:37 AM     10/20/2021 02:17 PM    K 4.9 2022 09:53 AM    K 4.1 2022 08:37 AM    K 4.2 10/20/2021 02:17 PM    K 4.1 2018 04:20 AM    K 3.9 2018 04:23 PM    CO2 26 2022 09:53 AM    CO2 22 2022 08:37 AM    CO2 24 10/20/2021 02:17 PM    BUN 9 2022 09:53 AM    BUN 11 2022 08:37 AM    BUN 17 10/20/2021 02:17 PM    CREATININE 0.6 2022 09:53 AM    CREATININE <0.5 2022 08:37 AM    CREATININE 0.5 10/20/2021 02:17 PM    GLUCOSE 167 2022 09:53 AM    GLUCOSE 167 2022 08:37 AM    GLUCOSE 84 10/20/2021 02:17 PM    CALCIUM 9.5 2022 09:53 AM    CALCIUM 9.3 04/14/2022 08:37 AM    CALCIUM 10.3 10/20/2021 02:17 PM     Lab Results   Component Value Date/Time    WBC 10.2 04/16/2021 07:00 PM    WBC 4.2 08/20/2019 09:56 AM    WBC 10.8 04/20/2018 04:23 PM    HGB 13.4 04/16/2021 07:00 PM    HGB 13.1 08/20/2019 09:56 AM    HGB 14.5 04/20/2018 04:23 PM    HCT 41.0 04/16/2021 07:00 PM    HCT 38.7 08/20/2019 09:56 AM    HCT 43.8 04/20/2018 04:23 PM    MCV 92.3 04/16/2021 07:00 PM    MCV 91.9 08/20/2019 09:56 AM    MCV 91.9 04/20/2018 04:23 PM     04/16/2021 07:00 PM     08/20/2019 09:56 AM     04/20/2018 04:23 PM     Lab Results   Component Value Date/Time    CHOL 141 08/01/2022 09:53 AM    CHOL 139 04/14/2022 08:37 AM    CHOL 143 06/14/2021 08:56 AM    TRIG 187 08/01/2022 09:53 AM    TRIG 194 04/14/2022 08:37 AM    TRIG 220 06/14/2021 08:56 AM    HDL 43 08/01/2022 09:53 AM    HDL 42 04/14/2022 08:37 AM    HDL 48 06/14/2021 08:56 AM    HDL 49 09/27/2011 09:20 AM    LDLDIRECT 107 04/21/2018 04:20 AM    LDLDIRECT See Note 08/12/2013 01:49 PM       Vitals 10/7/2022 10/7/2022 1/54/4429   SYSTOLIC 530 105 143   DIASTOLIC 80 70 58   Site Right Upper Arm - -   Position Sitting - -   Cuff Size - - -   Pulse - 74 76   Temp - - -   Resp - - -   SpO2 - 97 99   Weight - 140 lb 138 lb   Height - 5' 4\" 5' 4\"   Body mass index - 24.03 kg/m2 23.69 kg/m2   Some recent data might be hidden       Back Pain  This is a new problem. The current episode started in the past 7 days. The pain is present in the thoracic spine. The pain is at a severity of 6/10. Pertinent negatives include no chest pain or headaches. Hypertension  This is a chronic problem. The current episode started more than 1 year ago. The problem is uncontrolled. Pertinent negatives include no anxiety, blurred vision, chest pain, headaches, malaise/fatigue, neck pain, orthopnea, peripheral edema, PND, shortness of breath or sweats.      Review of Systems   Constitutional:  Negative for malaise/fatigue. Eyes:  Negative for blurred vision. Respiratory:  Negative for shortness of breath. Cardiovascular:  Negative for chest pain, orthopnea and PND. Musculoskeletal:  Positive for back pain. Negative for neck pain. Neurological:  Negative for headaches. Objective   Physical Exam  Constitutional:       General: She is not in acute distress. Appearance: Normal appearance. HENT:      Head: Normocephalic and atraumatic. Right Ear: Tympanic membrane normal.      Left Ear: Tympanic membrane normal.      Nose: Nose normal.   Eyes:      Extraocular Movements: Extraocular movements intact. Conjunctiva/sclera: Conjunctivae normal.      Pupils: Pupils are equal, round, and reactive to light. Neck:      Vascular: No carotid bruit. Cardiovascular:      Rate and Rhythm: Normal rate and regular rhythm. Pulses: Normal pulses. Heart sounds: No murmur heard. Pulmonary:      Effort: Pulmonary effort is normal. No respiratory distress. Breath sounds: Normal breath sounds. Abdominal:      General: Abdomen is flat. Bowel sounds are normal. There is no distension. Palpations: Abdomen is soft. Tenderness: There is no abdominal tenderness. Musculoskeletal:         General: No swelling or deformity. Cervical back: Neck supple. Spasms and tenderness present. No rigidity. Decreased range of motion. Right lower leg: No edema. Left lower leg: No edema. Lymphadenopathy:      Cervical: No cervical adenopathy. Skin:     Coloration: Skin is not jaundiced. Findings: No bruising, erythema or lesion. Neurological:      General: No focal deficit present. Mental Status: She is alert and oriented to person, place, and time. Cranial Nerves: No cranial nerve deficit. Motor: No weakness. Gait: Gait normal.          This dictation was generated by voice recognition computer software.   Although all attempts are made to edit the dictation for accuracy, there may be errors in the transcription that are not intended. An electronic signature was used to authenticate this note.     --Lana Curtis MD

## 2022-10-10 RX ORDER — GLIPIZIDE 2.5 MG/1
TABLET, EXTENDED RELEASE ORAL
Qty: 60 TABLET | Refills: 5 | Status: SHIPPED | OUTPATIENT
Start: 2022-10-10

## 2022-11-17 ENCOUNTER — TELEPHONE (OUTPATIENT)
Dept: WOMENS IMAGING | Age: 72
End: 2022-11-17

## 2022-11-17 ENCOUNTER — TELEPHONE (OUTPATIENT)
Dept: INTERNAL MEDICINE CLINIC | Age: 72
End: 2022-11-17

## 2022-11-17 DIAGNOSIS — Z78.0 POST-MENOPAUSAL: Primary | ICD-10-CM

## 2022-11-17 DIAGNOSIS — R31.29 OTHER MICROSCOPIC HEMATURIA: Primary | ICD-10-CM

## 2022-11-17 NOTE — TELEPHONE ENCOUNTER
While one the phone with patient about health maintenance she asked about lab results. She was told last time she was in to see  that they found blood in her urine and then she had to give another sample to lab. She had not heard back about her results and would like to know if she needs to do anything further.

## 2022-11-18 DIAGNOSIS — Z12.31 SCREENING MAMMOGRAM FOR BREAST CANCER: Primary | ICD-10-CM

## 2022-11-18 NOTE — TELEPHONE ENCOUNTER
The last urine I see was from August 26, 2022. Is there a more recent urine that she is questioning? If she able to come in and give us another urine sample? She did have blood in her urine from August 26.

## 2022-11-21 NOTE — TELEPHONE ENCOUNTER
I am not sure why this message that I answered has come back to me unaddressed. Please call patient, she had blood in her urine in August. Would have her repeat her urine when she is back in December. I have added a urine to her lab orders. Has open orders for August as well.

## 2022-12-05 RX ORDER — DILTIAZEM HYDROCHLORIDE 240 MG/1
CAPSULE, EXTENDED RELEASE ORAL
Qty: 180 CAPSULE | Refills: 3 | OUTPATIENT
Start: 2022-12-05

## 2022-12-06 ENCOUNTER — TELEPHONE (OUTPATIENT)
Dept: INTERNAL MEDICINE CLINIC | Age: 72
End: 2022-12-06

## 2022-12-06 RX ORDER — DILTIAZEM HYDROCHLORIDE 240 MG/1
240 CAPSULE, EXTENDED RELEASE ORAL 2 TIMES DAILY
Qty: 180 CAPSULE | Refills: 3 | Status: SHIPPED | OUTPATIENT
Start: 2022-12-06

## 2022-12-06 NOTE — TELEPHONE ENCOUNTER
Pt  calling requesting refill of  Diltiazem     Last written  4/12/22  Last OV  10/7/22  Next OV  12/13/22  Last recommended OV NA    Please send to    [facility]

## 2022-12-07 RX ORDER — LISINOPRIL 20 MG/1
TABLET ORAL
Qty: 180 TABLET | Refills: 3 | Status: SHIPPED | OUTPATIENT
Start: 2022-12-07

## 2022-12-07 NOTE — TELEPHONE ENCOUNTER
Future Appointments    Encounter Information    Provider Department Appt Notes   12/13/2022 Norris Hobson, [address] [facility] 4 months  for labs prior// may be due for colon cancer screening     Past Visits    Date Provider Specialty Visit Type Primary Dx   10/07/2022 Tim Koch MD Internal Medicine Office Visit Cervical radicular pain   08/12/2022 Norris Hobson, [facility] Internal Medicine Office Visit Medicare annual wellness visit, subsequent

## 2022-12-12 DIAGNOSIS — E11.9 TYPE 2 DIABETES MELLITUS WITHOUT COMPLICATION, WITHOUT LONG-TERM CURRENT USE OF INSULIN (HCC): ICD-10-CM

## 2022-12-12 DIAGNOSIS — I10 PRIMARY HYPERTENSION: ICD-10-CM

## 2022-12-12 DIAGNOSIS — R31.29 OTHER MICROSCOPIC HEMATURIA: ICD-10-CM

## 2022-12-12 LAB
A/G RATIO: 1.7 (ref 1.1–2.2)
ALBUMIN SERPL-MCNC: 4.6 G/DL (ref 3.4–5)
ALP BLD-CCNC: 92 U/L (ref 40–129)
ALT SERPL-CCNC: 12 U/L (ref 10–40)
ANION GAP SERPL CALCULATED.3IONS-SCNC: 14 MMOL/L (ref 3–16)
AST SERPL-CCNC: 14 U/L (ref 15–37)
BASOPHILS ABSOLUTE: 0 K/UL (ref 0–0.2)
BASOPHILS RELATIVE PERCENT: 0.3 %
BILIRUB SERPL-MCNC: 0.3 MG/DL (ref 0–1)
BUN BLDV-MCNC: 13 MG/DL (ref 7–20)
CALCIUM SERPL-MCNC: 9.5 MG/DL (ref 8.3–10.6)
CHLORIDE BLD-SCNC: 102 MMOL/L (ref 99–110)
CHOLESTEROL, TOTAL: 182 MG/DL (ref 0–199)
CO2: 26 MMOL/L (ref 21–32)
CREAT SERPL-MCNC: <0.5 MG/DL (ref 0.6–1.2)
EOSINOPHILS ABSOLUTE: 0.1 K/UL (ref 0–0.6)
EOSINOPHILS RELATIVE PERCENT: 1.6 %
GFR SERPL CREATININE-BSD FRML MDRD: >60 ML/MIN/{1.73_M2}
GLUCOSE BLD-MCNC: 167 MG/DL (ref 70–99)
HCT VFR BLD CALC: 37.5 % (ref 36–48)
HDLC SERPL-MCNC: 45 MG/DL (ref 40–60)
HEMOGLOBIN: 12.5 G/DL (ref 12–16)
LDL CHOLESTEROL CALCULATED: 83 MG/DL
LYMPHOCYTES ABSOLUTE: 1.5 K/UL (ref 1–5.1)
LYMPHOCYTES RELATIVE PERCENT: 25.2 %
MCH RBC QN AUTO: 29.8 PG (ref 26–34)
MCHC RBC AUTO-ENTMCNC: 33.2 G/DL (ref 31–36)
MCV RBC AUTO: 89.6 FL (ref 80–100)
MONOCYTES ABSOLUTE: 0.4 K/UL (ref 0–1.3)
MONOCYTES RELATIVE PERCENT: 6.6 %
NEUTROPHILS ABSOLUTE: 4 K/UL (ref 1.7–7.7)
NEUTROPHILS RELATIVE PERCENT: 66.3 %
PDW BLD-RTO: 14.2 % (ref 12.4–15.4)
PLATELET # BLD: 194 K/UL (ref 135–450)
PMV BLD AUTO: 9.3 FL (ref 5–10.5)
POTASSIUM SERPL-SCNC: 4.7 MMOL/L (ref 3.5–5.1)
RBC # BLD: 4.19 M/UL (ref 4–5.2)
SODIUM BLD-SCNC: 142 MMOL/L (ref 136–145)
TOTAL PROTEIN: 7.3 G/DL (ref 6.4–8.2)
TRIGL SERPL-MCNC: 271 MG/DL (ref 0–150)
VLDLC SERPL CALC-MCNC: 54 MG/DL
WBC # BLD: 6 K/UL (ref 4–11)

## 2022-12-13 ENCOUNTER — OFFICE VISIT (OUTPATIENT)
Dept: INTERNAL MEDICINE CLINIC | Age: 72
End: 2022-12-13
Payer: MEDICARE

## 2022-12-13 VITALS
DIASTOLIC BLOOD PRESSURE: 78 MMHG | BODY MASS INDEX: 23.69 KG/M2 | OXYGEN SATURATION: 98 % | WEIGHT: 138 LBS | SYSTOLIC BLOOD PRESSURE: 148 MMHG | HEART RATE: 86 BPM

## 2022-12-13 DIAGNOSIS — Z12.11 COLON CANCER SCREENING: ICD-10-CM

## 2022-12-13 DIAGNOSIS — R80.9 MICROALBUMINURIA DUE TO TYPE 2 DIABETES MELLITUS (HCC): ICD-10-CM

## 2022-12-13 DIAGNOSIS — E78.5 HYPERLIPIDEMIA LDL GOAL <70: ICD-10-CM

## 2022-12-13 DIAGNOSIS — E11.9 TYPE 2 DIABETES MELLITUS WITHOUT COMPLICATION, WITHOUT LONG-TERM CURRENT USE OF INSULIN (HCC): ICD-10-CM

## 2022-12-13 DIAGNOSIS — I25.2 HISTORY OF MI (MYOCARDIAL INFARCTION): ICD-10-CM

## 2022-12-13 DIAGNOSIS — I10 PRIMARY HYPERTENSION: ICD-10-CM

## 2022-12-13 DIAGNOSIS — K21.9 GASTROESOPHAGEAL REFLUX DISEASE, UNSPECIFIED WHETHER ESOPHAGITIS PRESENT: ICD-10-CM

## 2022-12-13 DIAGNOSIS — R10.13 EPIGASTRIC PAIN: Primary | ICD-10-CM

## 2022-12-13 DIAGNOSIS — E11.29 MICROALBUMINURIA DUE TO TYPE 2 DIABETES MELLITUS (HCC): ICD-10-CM

## 2022-12-13 LAB
BACTERIA: ABNORMAL /HPF
BILIRUBIN URINE: NEGATIVE
BLOOD, URINE: ABNORMAL
CLARITY: CLEAR
COLOR: YELLOW
CREATININE URINE: 66.9 MG/DL (ref 28–259)
EPITHELIAL CELLS, UA: 1 /HPF (ref 0–5)
ESTIMATED AVERAGE GLUCOSE: 174.3 MG/DL
GLUCOSE URINE: >=1000 MG/DL
HBA1C MFR BLD: 7.7 %
HYALINE CASTS: 0 /LPF (ref 0–8)
KETONES, URINE: NEGATIVE MG/DL
LEUKOCYTE ESTERASE, URINE: ABNORMAL
MICROALBUMIN UR-MCNC: 2.5 MG/DL
MICROALBUMIN/CREAT UR-RTO: 37.4 MG/G (ref 0–30)
MICROSCOPIC EXAMINATION: YES
NITRITE, URINE: NEGATIVE
PH UA: 6 (ref 5–8)
PROTEIN UA: NEGATIVE MG/DL
RBC UA: 5 /HPF (ref 0–4)
SPECIFIC GRAVITY UA: 1.02 (ref 1–1.03)
URINE TYPE: ABNORMAL
UROBILINOGEN, URINE: 0.2 E.U./DL
WBC UA: 4 /HPF (ref 0–5)

## 2022-12-13 PROCEDURE — G8420 CALC BMI NORM PARAMETERS: HCPCS | Performed by: INTERNAL MEDICINE

## 2022-12-13 PROCEDURE — G8484 FLU IMMUNIZE NO ADMIN: HCPCS | Performed by: INTERNAL MEDICINE

## 2022-12-13 PROCEDURE — 3074F SYST BP LT 130 MM HG: CPT | Performed by: INTERNAL MEDICINE

## 2022-12-13 PROCEDURE — 2022F DILAT RTA XM EVC RTNOPTHY: CPT | Performed by: INTERNAL MEDICINE

## 2022-12-13 PROCEDURE — 3051F HG A1C>EQUAL 7.0%<8.0%: CPT | Performed by: INTERNAL MEDICINE

## 2022-12-13 PROCEDURE — 3017F COLORECTAL CA SCREEN DOC REV: CPT | Performed by: INTERNAL MEDICINE

## 2022-12-13 PROCEDURE — 1090F PRES/ABSN URINE INCON ASSESS: CPT | Performed by: INTERNAL MEDICINE

## 2022-12-13 PROCEDURE — 3078F DIAST BP <80 MM HG: CPT | Performed by: INTERNAL MEDICINE

## 2022-12-13 PROCEDURE — G8427 DOCREV CUR MEDS BY ELIG CLIN: HCPCS | Performed by: INTERNAL MEDICINE

## 2022-12-13 PROCEDURE — 1036F TOBACCO NON-USER: CPT | Performed by: INTERNAL MEDICINE

## 2022-12-13 PROCEDURE — G8399 PT W/DXA RESULTS DOCUMENT: HCPCS | Performed by: INTERNAL MEDICINE

## 2022-12-13 PROCEDURE — 1123F ACP DISCUSS/DSCN MKR DOCD: CPT | Performed by: INTERNAL MEDICINE

## 2022-12-13 PROCEDURE — 99214 OFFICE O/P EST MOD 30 MIN: CPT | Performed by: INTERNAL MEDICINE

## 2022-12-13 RX ORDER — PANTOPRAZOLE SODIUM 40 MG/1
40 TABLET, DELAYED RELEASE ORAL
Qty: 90 TABLET | Refills: 1 | Status: SHIPPED | OUTPATIENT
Start: 2022-12-13

## 2022-12-13 SDOH — ECONOMIC STABILITY: FOOD INSECURITY: WITHIN THE PAST 12 MONTHS, YOU WORRIED THAT YOUR FOOD WOULD RUN OUT BEFORE YOU GOT MONEY TO BUY MORE.: NEVER TRUE

## 2022-12-13 SDOH — ECONOMIC STABILITY: FOOD INSECURITY: WITHIN THE PAST 12 MONTHS, THE FOOD YOU BOUGHT JUST DIDN'T LAST AND YOU DIDN'T HAVE MONEY TO GET MORE.: NEVER TRUE

## 2022-12-13 ASSESSMENT — SOCIAL DETERMINANTS OF HEALTH (SDOH): HOW HARD IS IT FOR YOU TO PAY FOR THE VERY BASICS LIKE FOOD, HOUSING, MEDICAL CARE, AND HEATING?: NOT HARD AT ALL

## 2022-12-13 NOTE — PROGRESS NOTES
Patient: Justa Thornton is a 67 y.o. female who presents today with the following Chief Complaint(s):  Chief Complaint   Patient presents with    Follow-up       HPI    Here today for follow up. HTN- Patient is visibly upset as she had a long wait today. Denies any complaints today. DM- has noticed that her sugars have been running around 150. States that she believes that she feels bad if her sugars are high but does not test her sugars. Will feel dizzy, fatigue, or blurred vision. Not really sure how often she feels poorly but is more than once a month, not more than twice a week. Is taking metformin ER 1500 mg daily and glipizide 2.5 mg BID. HLD- on Crestor. Not missed any doses . Has been having issues with pain in her epigastric area, radiates through to her back. Has also been belching more. Does have h/o CAD and MI. Angina was in her epigastric area and back. Did see Dr. Moy De Los Santos in July and had stress echo in July. No h/o ulcer. No h/o EGD.          Results for orders placed or performed in visit on 12/12/22   Microalbumin / Creatinine Urine Ratio   Result Value Ref Range    Microalbumin, Random Urine 2.50 (H) <2.0 mg/dL    Creatinine, Ur 66.9 28.0 - 259.0 mg/dL    Microalbumin Creatinine Ratio 37.4 (H) 0.0 - 30.0 mg/g      Lab Results   Component Value Date    LABA1C 7.7 12/12/2022    LABA1C 7.4 08/01/2022    LABA1C 7.0 10/20/2021     Lab Results   Component Value Date    LABMICR YES 12/12/2022    LABMICR 2.50 (H) 12/12/2022    LDLCALC 83 12/12/2022    CREATININE <0.5 (L) 12/12/2022     Lab Results   Component Value Date     12/12/2022    K 4.7 12/12/2022     12/12/2022    CO2 26 12/12/2022    BUN 13 12/12/2022    CREATININE <0.5 (L) 12/12/2022    GLUCOSE 167 (H) 12/12/2022    CALCIUM 9.5 12/12/2022    PROT 7.3 12/12/2022    LABALBU 4.6 12/12/2022    BILITOT 0.3 12/12/2022    ALKPHOS 92 12/12/2022    AST 14 (L) 12/12/2022    ALT 12 12/12/2022    LABGLOM >60 12/12/2022 GFRAA >60 08/01/2022    AGRATIO 1.7 12/12/2022    GLOB 2.7 10/20/2021       Lab Results   Component Value Date    CHOL 182 12/12/2022    CHOL 141 08/01/2022    CHOL 139 04/14/2022     Lab Results   Component Value Date    TRIG 271 (H) 12/12/2022    TRIG 187 (H) 08/01/2022    TRIG 194 (H) 04/14/2022     Lab Results   Component Value Date    HDL 45 12/12/2022    HDL 43 08/01/2022    HDL 42 04/14/2022     Lab Results   Component Value Date    LDLCALC 83 12/12/2022    LDLCALC 61 08/01/2022    LDLCALC 58 04/14/2022     Lab Results   Component Value Date    LABVLDL 54 12/12/2022    LABVLDL 37 08/01/2022    LABVLDL 39 04/14/2022     No results found for: CHOLHDLRATIO      Allergies   Allergen Reactions    Doxycycline Nausea And Vomiting    Asa Arthritis Strength-Antacid [Aspirin Buff, Al Hyd-Mg Hyd] Nausea Only     Per patient stopped taking because it upset her stomach. She denies itching, swelling, rash. Aspirin      Other reaction(s): GI Upset    Atorvastatin      Other reaction(s): Muscle Aches    Erythromycin Base Hives    Macrolides And Ketolides     Troleandomycin     Verapamil      Other reaction(s):  Other (See Comments)  Low heart rate     Sulfa Antibiotics Rash      Past Medical History:   Diagnosis Date    Allergic rhinitis     Fibromyalgia     GERD (gastroesophageal reflux disease)     Heart attack ([address]     Heart disease     Herniated disc     Hyperlipidemia     Hypertension     Type II or unspecified type diabetes mellitus without mention of complication, not stated as uncontrolled       Past Surgical History:   Procedure Laterality Date    CORONARY ANGIOPLASTY WITH STENT PLACEMENT  2018    HYMENECTOMY        Social History     Socioeconomic History    Marital status:      Spouse name: Not on file    Number of children: Not on file    Years of education: Not on file    Highest education level: Not on file   Occupational History    Not on file   Tobacco Use    Smoking status: Never    Smokeless tobacco: Never   Vaping Use    Vaping Use: Never used   Substance and Sexual Activity    Alcohol use: No    Drug use: No    Sexual activity: Not on file     Comment:    Other Topics Concern    Not on file   Social History Narrative    Not on file     Social Determinants of Health     Financial Resource Strain: Low Risk     Difficulty of Paying Living Expenses: Not hard at all   Food Insecurity: No Food Insecurity    Worried About Running Out of Food in the Last Year: Never true    Ran Out of Food in the Last Year: Never true   Transportation Needs: Not on file   Physical Activity: Insufficiently Active    Days of Exercise per Week: 1 day    Minutes of Exercise per Session: 30 min   Stress: Not on file   Social Connections: Not on file   Intimate Partner Violence: Not on file   Housing Stability: Not on file     Family History   Problem Relation Age of Onset    Heart Disease Mother     Cancer Father         ? lymphoma ( when patient was very young)    Diabetes Sister     Thyroid Disease Daughter         Outpatient Medications Prior to Visit   Medication Sig Dispense Refill    lisinopril (PRINIVIL;ZESTRIL) 20 MG tablet TAKE ONE TABLET BY MOUTH TWICE A  tablet 3    dilTIAZem (TIAZAC) 240 MG extended release capsule Take 1 capsule by mouth 2 times daily 180 capsule 3    glipiZIDE (GLUCOTROL XL) 2.5 MG extended release tablet TAKE ONE TABLET BY MOUTH TWICE A DAY WITH A MEAL 60 tablet 5    metFORMIN (GLUCOPHAGE-XR) 500 MG extended release tablet Take 3 tablets by mouth daily (with breakfast) 270 tablet 3    VITAMIN D PO Take by mouth      rosuvastatin (CRESTOR) 10 MG tablet TAKE ONE TABLET BY MOUTH ONCE NIGHTLY 90 tablet 3    aspirin EC 81 MG EC tablet Take 1 tablet by mouth daily 90 tablet 1    vitamin B-12 (CYANOCOBALAMIN) 1000 MCG tablet Take 1,000 mcg by mouth daily      Multiple Vitamins-Minerals (CENTRUM SILVER) TABS Take by mouth      tiZANidine (ZANAFLEX) 2 MG tablet Take 1 tablet by mouth nightly as needed (muscle spasms) 30 tablet 0    nitroGLYCERIN (NITROSTAT) 0.4 MG SL tablet Place 1 tablet under the tongue every 5 minutes as needed for Chest pain 25 tablet 2    diclofenac sodium (VOLTAREN) 1 % GEL 2 grams applied to both hands QID prn; 4 grams applied to knees QID prn. 150 g 3    blood glucose monitor strips Test daily and as needed for symptoms of irregular blood glucose. Dispense sufficient amount for indicated testing frequency plus additional to accommodate PRN testing needs. 100 strip 3    acetaminophen (TYLENOL) 500 MG tablet Take 500 mg by mouth every 6 hours as needed for Pain      blood glucose monitor strips Test 3 times a day & as needed for symptoms of irregular blood glucose. Dispense sufficient amount for indicated testing frequency plus additional to accommodate PRN testing needs. Which ever the insurance will cover. 100 strip 3    Elastic Bandages & Supports (CARPAL TUNNEL WRIST DELUXE) MISC 1 each by Does not apply route nightly 1 each 0     No facility-administered medications prior to visit. Patient'spast medical history, surgical history, family history, medications,  and allergies  were all reviewed and updated as appropriate today. Review of Systems    BP (!) 148/78 (Site: Right Upper Arm, Position: Sitting, Cuff Size: Medium Adult)   Pulse 86   Wt 138 lb (62.6 kg)   SpO2 98%   BMI 23.69 kg/m²   Physical Exam  Vitals and nursing note reviewed. Constitutional:       Appearance: She is well-developed. She is not toxic-appearing. HENT:      Head: Normocephalic. Right Ear: Tympanic membrane, ear canal and external ear normal.      Left Ear: Tympanic membrane, ear canal and external ear normal.      Mouth/Throat:      Pharynx: No oropharyngeal exudate or posterior oropharyngeal erythema. Eyes:      General: No scleral icterus. Extraocular Movements: Extraocular movements intact.       Conjunctiva/sclera: Conjunctivae normal.      Pupils: Pupils are equal, round, and reactive to light. Neck:      Thyroid: No thyroid mass or thyromegaly. Vascular: No carotid bruit. Cardiovascular:      Rate and Rhythm: Normal rate and regular rhythm. Heart sounds: Normal heart sounds. No murmur heard. Pulmonary:      Effort: Pulmonary effort is normal.      Breath sounds: Normal breath sounds. Abdominal:      Palpations: Abdomen is soft. Tenderness: There is abdominal tenderness in the epigastric area. Musculoskeletal:      Right lower leg: No edema. Left lower leg: No edema. Lymphadenopathy:      Cervical: No cervical adenopathy. Neurological:      General: No focal deficit present. Mental Status: She is alert and oriented to person, place, and time. Psychiatric:         Mood and Affect: Mood normal.         Behavior: Behavior normal. Behavior is cooperative. ASSESSMENT/PLAN:    Problem List Items Addressed This Visit       Colon cancer screening     Patient has never undergone a colonoscopy. She is not anemic but her hemoglobin has been decreasing. She is also having symptoms of GERD for which she should undergo EGD to differentiate between GERD and CAD given her history of GERD symptoms as anginal equivalent. She is now agreeable to colonoscopy. Referral placed to GI. Relevant Orders    SUMIT Cerna MD, Gastroenterology, [facility]    Epigastric pain - Primary       Patient with GERD symptoms which have been anginal equivalent in the past.  Did have normal stress echo in July 2022. Add pantoprazole 40 mg daily. Referral placed to GI. Relevant Orders    SUMIT Cerna MD, Gastroenterology, [facility]    GERD (gastroesophageal reflux disease)      Patient with GERD symptoms which have been anginal equivalent in the past.  Did have normal stress echo in July 2022. Add pantoprazole 40 mg daily. Referral placed to GI.          Relevant Medications    pantoprazole (PROTONIX) 40 MG tablet BY MOUTH TWICE A DAY WITH A MEAL 60 tablet 5    metFORMIN (GLUCOPHAGE-XR) 500 MG extended release tablet Take 3 tablets by mouth daily (with breakfast) 270 tablet 3    VITAMIN D PO Take by mouth      rosuvastatin (CRESTOR) 10 MG tablet TAKE ONE TABLET BY MOUTH ONCE NIGHTLY 90 tablet 3    aspirin EC 81 MG EC tablet Take 1 tablet by mouth daily 90 tablet 1    vitamin B-12 (CYANOCOBALAMIN) 1000 MCG tablet Take 1,000 mcg by mouth daily      Multiple Vitamins-Minerals (CENTRUM SILVER) TABS Take by mouth      nitroGLYCERIN (NITROSTAT) 0.4 MG SL tablet Place 1 tablet under the tongue every 5 minutes as needed for Chest pain 25 tablet 2    blood glucose monitor strips Test daily and as needed for symptoms of irregular blood glucose. Dispense sufficient amount for indicated testing frequency plus additional to accommodate PRN testing needs. 100 strip 3    acetaminophen (TYLENOL) 500 MG tablet Take 500 mg by mouth every 6 hours as needed for Pain      blood glucose monitor strips Test 3 times a day & as needed for symptoms of irregular blood glucose. Dispense sufficient amount for indicated testing frequency plus additional to accommodate PRN testing needs. Which ever the insurance will cover. 100 strip 3     No current facility-administered medications for this visit. Return in about 4 months (around 4/13/2023).

## 2022-12-13 NOTE — PATIENT INSTRUCTIONS
Marco Savagezen helps your blood sugars by causing your kidneys to filter out excess sugar into your urine. In doing this, it does increase your risk of bladder infections and yeast infections. To prevent this, please make sure that you drink lots of water, you urinate when you need to, and clean well using wet wipes (such as Cottonelle or Vern flushable wipes) following urination. You may also feel light headed as a result of this medication as it does cause you to urinate more and may help to lower your blood pressure. Please let me know if this is happening. Please drink plenty of water. This medication is very good as it will help you lose weight and is protective of your heart.

## 2022-12-27 PROBLEM — R80.9 MICROALBUMINURIA DUE TO TYPE 2 DIABETES MELLITUS (HCC): Status: ACTIVE | Noted: 2022-12-27

## 2022-12-27 PROBLEM — R10.13 EPIGASTRIC PAIN: Status: ACTIVE | Noted: 2022-12-27

## 2022-12-27 PROBLEM — E11.29 MICROALBUMINURIA DUE TO TYPE 2 DIABETES MELLITUS (HCC): Status: ACTIVE | Noted: 2022-12-27

## 2022-12-27 NOTE — ASSESSMENT & PLAN NOTE
. Follows with Dr. Kaity Montiel. On ASA, lisinopril 40 mg qd, diltiazem  mg mg twice daily, and Crestor 10 mg qd with NTG SL prn. Sx of CAD were NULL and stomach pain. Patient currently complaining of stomach pain that feels different than her angina. She did undergo a stress echo in July 2022 that was negative for ischemia.

## 2022-12-27 NOTE — ASSESSMENT & PLAN NOTE
Patient has never undergone a colonoscopy. She is not anemic but her hemoglobin has been decreasing. She is also having symptoms of GERD for which she should undergo EGD to differentiate between GERD and CAD given her history of GERD symptoms as anginal equivalent. She is now agreeable to colonoscopy. Referral placed to GI.

## 2022-12-27 NOTE — ASSESSMENT & PLAN NOTE
Elevated today but typically well controlled per patient. Continue diltiazem  mg twice daily and lisinopril 20 mg twice daily.

## 2022-12-27 NOTE — ASSESSMENT & PLAN NOTE
Patient with GERD symptoms which have been anginal equivalent in the past.  Did have normal stress echo in July 2022. Add pantoprazole 40 mg daily. Referral placed to GI.

## 2022-12-27 NOTE — ASSESSMENT & PLAN NOTE
Hemoglobin A1c has been increasing. Possible litters but not checking her sugars. Continue metformin ER 1500 mg daily and glipizide 2.5 mg twice daily.   Due to microalbuminuria, consider adding Fayrene Lesches but concern is for cost.

## 2023-01-12 DIAGNOSIS — E11.9 TYPE 2 DIABETES MELLITUS WITHOUT COMPLICATION, WITHOUT LONG-TERM CURRENT USE OF INSULIN (HCC): ICD-10-CM

## 2023-01-12 RX ORDER — BLOOD SUGAR DIAGNOSTIC
STRIP MISCELLANEOUS
Qty: 100 STRIP | Refills: 3 | Status: SHIPPED | OUTPATIENT
Start: 2023-01-12

## 2023-01-12 NOTE — TELEPHONE ENCOUNTER
Future Appointments    Encounter Information    Provider Department Appt Notes   4/14/2023 Norris Hobson, [address] [facility] Return in about 4 months (around 4/13/2023). q     Past Visits    Date Provider Specialty Visit Type Primary Dx   12/13/2022 Norris Hobson DO Internal Medicine Office Visit Epigastric pain   08/12/2022 Norris Hobson, [address] [facility] Internal Medicine Office Visit Medicare annual wellness visit, subsequent

## 2023-01-26 PROBLEM — Z12.11 COLON CANCER SCREENING: Status: RESOLVED | Noted: 2020-08-16 | Resolved: 2023-01-26

## 2023-02-21 NOTE — ASSESSMENT & PLAN NOTE
Asymptomatic. Continues to follow with Dr. Rut Batres. Remains on aspirin, lisinopril 40 mg daily, Cardizem  mg twice daily, and Crestor 10 mg daily with as needed nitroglycerin.
Check B12 level. Previously on B12 injections.
Check hemoglobin A1c. Continue glipizide 2.5 mg twice daily and Metformin ER 1000 mg twice daily.
Check lipid panel, CMP. Continue Crestor 10 mg daily.
Check vitamin D level.
Elevated today. States blood pressure is better controlled outside of the office. Remains on diltiazem  mg twice daily and lisinopril 20 mg twice daily. Did miss her evening doses of medication last night.
Likely osteoarthritis. Will discuss with Dr. Dorean Favre when she establishes with her for knee pain. Recommend Voltaren gel.
Referral placed to Dr. Melvin Gayle for a third opinion.
SSA and SSB were negative in October 2021.
Status post MI in April 2018 that was treated with PTCA and stenting. Follows with Dr. Percy Bethea. On ASA, lisinopril 40 mg qd, diltiazem  mg mg twice daily, and Crestor 10 mg qd with NTG SL prn. Sx of CAD were NULL and stomach pain.
36.7

## 2023-04-07 RX ORDER — GLIPIZIDE 2.5 MG/1
TABLET, EXTENDED RELEASE ORAL
Qty: 180 TABLET | Refills: 0 | Status: SHIPPED | OUTPATIENT
Start: 2023-04-07

## 2023-04-11 DIAGNOSIS — I10 PRIMARY HYPERTENSION: ICD-10-CM

## 2023-04-11 DIAGNOSIS — E11.9 TYPE 2 DIABETES MELLITUS WITHOUT COMPLICATION, WITHOUT LONG-TERM CURRENT USE OF INSULIN (HCC): ICD-10-CM

## 2023-04-11 DIAGNOSIS — E11.29 MICROALBUMINURIA DUE TO TYPE 2 DIABETES MELLITUS (HCC): ICD-10-CM

## 2023-04-11 DIAGNOSIS — E78.5 HYPERLIPIDEMIA LDL GOAL <70: ICD-10-CM

## 2023-04-11 DIAGNOSIS — R80.9 MICROALBUMINURIA DUE TO TYPE 2 DIABETES MELLITUS (HCC): ICD-10-CM

## 2023-04-11 LAB
ALBUMIN SERPL-MCNC: 4.4 G/DL (ref 3.4–5)
ALBUMIN/GLOB SERPL: 1.6 {RATIO} (ref 1.1–2.2)
ALP SERPL-CCNC: 83 U/L (ref 40–129)
ALT SERPL-CCNC: 14 U/L (ref 10–40)
ANION GAP SERPL CALCULATED.3IONS-SCNC: 14 MMOL/L (ref 3–16)
AST SERPL-CCNC: 13 U/L (ref 15–37)
BILIRUB SERPL-MCNC: 0.3 MG/DL (ref 0–1)
BUN SERPL-MCNC: 10 MG/DL (ref 7–20)
CALCIUM SERPL-MCNC: 9.4 MG/DL (ref 8.3–10.6)
CHLORIDE SERPL-SCNC: 102 MMOL/L (ref 99–110)
CHOLEST SERPL-MCNC: 181 MG/DL (ref 0–199)
CO2 SERPL-SCNC: 25 MMOL/L (ref 21–32)
CREAT SERPL-MCNC: 0.5 MG/DL (ref 0.6–1.2)
GFR SERPLBLD CREATININE-BSD FMLA CKD-EPI: >60 ML/MIN/{1.73_M2}
GLUCOSE SERPL-MCNC: 166 MG/DL (ref 70–99)
HDLC SERPL-MCNC: 50 MG/DL (ref 40–60)
LDLC SERPL CALC-MCNC: 81 MG/DL
POTASSIUM SERPL-SCNC: 4.1 MMOL/L (ref 3.5–5.1)
PROT SERPL-MCNC: 7.1 G/DL (ref 6.4–8.2)
SODIUM SERPL-SCNC: 141 MMOL/L (ref 136–145)
TRIGL SERPL-MCNC: 248 MG/DL (ref 0–150)
TSH SERPL DL<=0.005 MIU/L-ACNC: 1.16 UIU/ML (ref 0.27–4.2)
VLDLC SERPL CALC-MCNC: 50 MG/DL

## 2023-04-12 LAB
EST. AVERAGE GLUCOSE BLD GHB EST-MCNC: 165.7 MG/DL
HBA1C MFR BLD: 7.4 %

## 2023-04-15 LAB — MISCELLANEOUS LAB TEST ORDER: ABNORMAL

## 2023-04-16 PROBLEM — M79.669 PAIN IN SHIN: Status: ACTIVE | Noted: 2023-04-16

## 2023-07-18 NOTE — PROGRESS NOTES
affect, memory, mentation, or behavior are noted    Stress Myoview 3/4/2019  Small sized lateral fixed defect of mild intensity consistent with    infarction in the territory of the mid and distal LCx . Normal LV size and systolic function. Angiogram 4/20/18  Acute inferior posterior wall MI  Cath with 40-50% mid RCA,100% OM1,EF 50% with mild inferior wall hypokinesis. Trop 2.99. PCI with 2.5 x 15 balloon then 2.5 x 18 manasa SHARIF stent to 16 hitesh with excellent angiographic result. Small distal branch with embolization. Will continue aaggrastat x 12 hours. Plavix given. She has been on crestor for lipid management,need to give in house  ECG-8/16/19 for chest pain  with borderline short AK interval otherwise normal-done for chest pain    Nuclear stress 3/4/19      Summary     Small sized lateral fixed defect of mild intensity consistent with     infarction in the territory of the mid and distal LCx . Normal LV size and systolic function. Assessment:   CAD (posterior wall MI April 2018)  Stable  denies anginal symptoms  [facility] 4/20/18> SHARIF to OM1  Family history of premature heart disease. Had GI symptoms (upper abd pain) that radiated to neck and back with prior stenting  Stress echo 7/14/22> EF preserved. Previous scar. Hypertension  Controlled   Blood pressure 136/82, pulse 76, height 5' 4\" (1.626 m), weight 138 lb 9.6 oz (62.9 kg), SpO2 98 %. Continue current medication regimen    Hyperlipidemia    8/4/20> , , HDL 47, LDL 66, ALT 18 AST 18   6/14/21> , , HDL 48, LDL 51, ALT 14 AST 15   4/14/22> , HDL 42, LDL 58, . Well Controlled.    4/11/23   HDL 50  LDL 81    Carotid dopplers 6/14/18 (Care Everywhere)> less than 50% mary  Abd u/s 6/3/17> Negative for aneurysm    Increase crestor to 20 mg > recheck lipids in 2 mos    Palpitations -risk for accessory pathway conduction  [facility] 9/24/19> SR/PAT - controlled   Intolerant to beta

## 2023-07-31 ENCOUNTER — TELEPHONE (OUTPATIENT)
Dept: PHARMACY | Facility: CLINIC | Age: 73
End: 2023-07-31

## 2023-07-31 NOTE — TELEPHONE ENCOUNTER
POPULATION HEALTH CLINICAL PHARMACY: STATIN THERAPY REVIEW  Identified statin use in persons with diabetes care gap per [facility] [employer] Records dated: 6/15/23. Last Visit: 4/14/23 with PCP  Next Visit: 8/1/23 with cardio; 8/14/23 with PCP    Patient found in Outcomes MTM and is not currently eligible for CMR or TIP     [facility] IDENTIFIED    Patient is prescribed: rosuvastatin 10mg daily  Last rx'd 4/14/23 #90, 3 refills  Per Outcomes MTM: last filled on 7/22/23 for 90 day supply. Per Reconcile Dispense History: last filled on 12/5/22 for 90 day supply. Per [address] automated order status phone line: last filled on 7/22/23     Allergies   Allergen Reactions    Doxycycline Nausea And Vomiting    Asa Arthritis Strength-Antacid [Aspirin Buff, Al Hyd-Mg Hyd] Nausea Only     Per patient stopped taking because it upset her stomach. She denies itching, swelling, rash. Aspirin      Other reaction(s): GI Upset    Atorvastatin      Other reaction(s): Muscle Aches    Erythromycin Base Hives    Macrolides And Ketolides     Troleandomycin     Verapamil      Other reaction(s): Other (See Comments)  Low heart rate     Sulfa Antibiotics Rash     Lab Results   Component Value Date    CHOL 181 04/11/2023    TRIG 248 (H) 04/11/2023    HDL 50 04/11/2023    LDLCALC 81 04/11/2023    LDLDIRECT 107 (H) 04/21/2018     ALT   Date Value Ref Range Status   04/11/2023 14 10 - 40 U/L Final     AST   Date Value Ref Range Status   04/11/2023 13 (L) 15 - 37 U/L Final     The ASCVD Risk score ([place] DK, et al., 2019) failed to calculate for the following reasons: The patient has a prior MI or stroke diagnosis     PLAN  The following are interventions that have been identified:  Adherence: rosuvastatin 10mg daily - appears did fill 7/22, but was 1st fill YTD? Reached patient to review. States she has been taking the rosuvastatin and refills it every 90 days, and had just picked it up in the past week or two.  In a restaurant and

## 2023-08-01 ENCOUNTER — OFFICE VISIT (OUTPATIENT)
Dept: CARDIOLOGY CLINIC | Age: 73
End: 2023-08-01
Payer: MEDICARE

## 2023-08-01 VITALS
SYSTOLIC BLOOD PRESSURE: 136 MMHG | OXYGEN SATURATION: 98 % | BODY MASS INDEX: 23.66 KG/M2 | HEART RATE: 76 BPM | DIASTOLIC BLOOD PRESSURE: 82 MMHG | HEIGHT: 64 IN | WEIGHT: 138.6 LBS

## 2023-08-01 DIAGNOSIS — E78.5 HYPERLIPIDEMIA LDL GOAL <70: Primary | ICD-10-CM

## 2023-08-01 DIAGNOSIS — I25.10 CORONARY ARTERY DISEASE INVOLVING NATIVE CORONARY ARTERY OF NATIVE HEART WITHOUT ANGINA PECTORIS: ICD-10-CM

## 2023-08-01 PROCEDURE — 3017F COLORECTAL CA SCREEN DOC REV: CPT | Performed by: INTERNAL MEDICINE

## 2023-08-01 PROCEDURE — G8420 CALC BMI NORM PARAMETERS: HCPCS | Performed by: INTERNAL MEDICINE

## 2023-08-01 PROCEDURE — G8427 DOCREV CUR MEDS BY ELIG CLIN: HCPCS | Performed by: INTERNAL MEDICINE

## 2023-08-01 PROCEDURE — 1036F TOBACCO NON-USER: CPT | Performed by: INTERNAL MEDICINE

## 2023-08-01 PROCEDURE — 3074F SYST BP LT 130 MM HG: CPT | Performed by: INTERNAL MEDICINE

## 2023-08-01 PROCEDURE — 3078F DIAST BP <80 MM HG: CPT | Performed by: INTERNAL MEDICINE

## 2023-08-01 PROCEDURE — 1123F ACP DISCUSS/DSCN MKR DOCD: CPT | Performed by: INTERNAL MEDICINE

## 2023-08-01 PROCEDURE — G8399 PT W/DXA RESULTS DOCUMENT: HCPCS | Performed by: INTERNAL MEDICINE

## 2023-08-01 PROCEDURE — 1090F PRES/ABSN URINE INCON ASSESS: CPT | Performed by: INTERNAL MEDICINE

## 2023-08-01 PROCEDURE — 99214 OFFICE O/P EST MOD 30 MIN: CPT | Performed by: INTERNAL MEDICINE

## 2023-08-01 RX ORDER — ROSUVASTATIN CALCIUM 20 MG/1
20 TABLET, COATED ORAL DAILY
Qty: 90 TABLET | Refills: 3 | Status: SHIPPED | OUTPATIENT
Start: 2023-08-01

## 2023-08-01 NOTE — PATIENT INSTRUCTIONS
Increase Crestor to 20 mg daily > recheck lipids in 2 mos  Continue risk factor modifications. Call for any change in symptoms, call to report any changes in shortness of breath or development of chest pain with activity.     Follow up in 6 mos

## 2023-08-14 ENCOUNTER — OFFICE VISIT (OUTPATIENT)
Dept: INTERNAL MEDICINE CLINIC | Age: 73
End: 2023-08-14
Payer: MEDICARE

## 2023-08-14 VITALS
OXYGEN SATURATION: 96 % | HEART RATE: 79 BPM | BODY MASS INDEX: 23.63 KG/M2 | SYSTOLIC BLOOD PRESSURE: 120 MMHG | HEIGHT: 64 IN | DIASTOLIC BLOOD PRESSURE: 72 MMHG | WEIGHT: 138.4 LBS

## 2023-08-14 DIAGNOSIS — R80.9 MICROALBUMINURIA DUE TO TYPE 2 DIABETES MELLITUS (HCC): ICD-10-CM

## 2023-08-14 DIAGNOSIS — R35.1 NOCTURIA: Primary | ICD-10-CM

## 2023-08-14 DIAGNOSIS — E11.9 TYPE 2 DIABETES MELLITUS WITHOUT COMPLICATION, WITHOUT LONG-TERM CURRENT USE OF INSULIN (HCC): ICD-10-CM

## 2023-08-14 DIAGNOSIS — E78.5 HYPERLIPIDEMIA LDL GOAL <70: ICD-10-CM

## 2023-08-14 DIAGNOSIS — I10 PRIMARY HYPERTENSION: ICD-10-CM

## 2023-08-14 DIAGNOSIS — E11.29 MICROALBUMINURIA DUE TO TYPE 2 DIABETES MELLITUS (HCC): ICD-10-CM

## 2023-08-14 DIAGNOSIS — R68.2 DRY MOUTH: ICD-10-CM

## 2023-08-14 DIAGNOSIS — I25.10 CORONARY ARTERY DISEASE INVOLVING NATIVE CORONARY ARTERY OF NATIVE HEART WITHOUT ANGINA PECTORIS: ICD-10-CM

## 2023-08-14 DIAGNOSIS — H04.123 DRY EYES: ICD-10-CM

## 2023-08-14 PROBLEM — R13.10 FOOD STICKS ON SWALLOWING: Status: RESOLVED | Noted: 2020-08-16 | Resolved: 2023-08-14

## 2023-08-14 PROBLEM — M79.642 PAIN IN BOTH HANDS: Status: RESOLVED | Noted: 2022-04-18 | Resolved: 2023-08-14

## 2023-08-14 PROBLEM — E55.9 VITAMIN D INSUFFICIENCY: Status: RESOLVED | Noted: 2020-08-16 | Resolved: 2023-08-14

## 2023-08-14 PROBLEM — I21.29 POSTERIOR MI (HCC): Status: RESOLVED | Noted: 2018-04-20 | Resolved: 2023-08-14

## 2023-08-14 PROBLEM — R10.13 EPIGASTRIC PAIN: Status: RESOLVED | Noted: 2022-12-27 | Resolved: 2023-08-14

## 2023-08-14 PROBLEM — G56.01 CARPAL TUNNEL SYNDROME ON RIGHT: Status: RESOLVED | Noted: 2020-11-15 | Resolved: 2023-08-14

## 2023-08-14 PROBLEM — R07.9 CHEST PAIN: Status: RESOLVED | Noted: 2021-04-16 | Resolved: 2023-08-14

## 2023-08-14 PROBLEM — M25.562 ACUTE PAIN OF BOTH KNEES: Status: RESOLVED | Noted: 2021-10-23 | Resolved: 2023-08-14

## 2023-08-14 PROBLEM — H92.02 LEFT EAR PAIN: Status: RESOLVED | Noted: 2021-12-12 | Resolved: 2023-08-14

## 2023-08-14 PROBLEM — M79.669 PAIN IN SHIN: Status: RESOLVED | Noted: 2023-04-16 | Resolved: 2023-08-14

## 2023-08-14 PROBLEM — M79.641 PAIN IN BOTH HANDS: Status: RESOLVED | Noted: 2022-04-18 | Resolved: 2023-08-14

## 2023-08-14 PROBLEM — M65.322 TRIGGER INDEX FINGER OF LEFT HAND: Status: RESOLVED | Noted: 2020-11-15 | Resolved: 2023-08-14

## 2023-08-14 PROBLEM — M25.561 ACUTE PAIN OF BOTH KNEES: Status: RESOLVED | Noted: 2021-10-23 | Resolved: 2023-08-14

## 2023-08-14 PROBLEM — E53.8 B12 DEFICIENCY: Status: RESOLVED | Noted: 2020-08-16 | Resolved: 2023-08-14

## 2023-08-14 PROCEDURE — 2022F DILAT RTA XM EVC RTNOPTHY: CPT | Performed by: INTERNAL MEDICINE

## 2023-08-14 PROCEDURE — 99214 OFFICE O/P EST MOD 30 MIN: CPT | Performed by: INTERNAL MEDICINE

## 2023-08-14 PROCEDURE — G8427 DOCREV CUR MEDS BY ELIG CLIN: HCPCS | Performed by: INTERNAL MEDICINE

## 2023-08-14 PROCEDURE — 3078F DIAST BP <80 MM HG: CPT | Performed by: INTERNAL MEDICINE

## 2023-08-14 PROCEDURE — G8399 PT W/DXA RESULTS DOCUMENT: HCPCS | Performed by: INTERNAL MEDICINE

## 2023-08-14 PROCEDURE — 3017F COLORECTAL CA SCREEN DOC REV: CPT | Performed by: INTERNAL MEDICINE

## 2023-08-14 PROCEDURE — 3051F HG A1C>EQUAL 7.0%<8.0%: CPT | Performed by: INTERNAL MEDICINE

## 2023-08-14 PROCEDURE — 3074F SYST BP LT 130 MM HG: CPT | Performed by: INTERNAL MEDICINE

## 2023-08-14 PROCEDURE — 1090F PRES/ABSN URINE INCON ASSESS: CPT | Performed by: INTERNAL MEDICINE

## 2023-08-14 PROCEDURE — 1036F TOBACCO NON-USER: CPT | Performed by: INTERNAL MEDICINE

## 2023-08-14 PROCEDURE — G8420 CALC BMI NORM PARAMETERS: HCPCS | Performed by: INTERNAL MEDICINE

## 2023-08-14 PROCEDURE — 1123F ACP DISCUSS/DSCN MKR DOCD: CPT | Performed by: INTERNAL MEDICINE

## 2023-08-14 SDOH — ECONOMIC STABILITY: INCOME INSECURITY: HOW HARD IS IT FOR YOU TO PAY FOR THE VERY BASICS LIKE FOOD, HOUSING, MEDICAL CARE, AND HEATING?: NOT HARD AT ALL

## 2023-08-14 SDOH — ECONOMIC STABILITY: FOOD INSECURITY: WITHIN THE PAST 12 MONTHS, YOU WORRIED THAT YOUR FOOD WOULD RUN OUT BEFORE YOU GOT MONEY TO BUY MORE.: NEVER TRUE

## 2023-08-14 SDOH — ECONOMIC STABILITY: FOOD INSECURITY: WITHIN THE PAST 12 MONTHS, THE FOOD YOU BOUGHT JUST DIDN'T LAST AND YOU DIDN'T HAVE MONEY TO GET MORE.: NEVER TRUE

## 2023-08-14 SDOH — ECONOMIC STABILITY: HOUSING INSECURITY
IN THE LAST 12 MONTHS, WAS THERE A TIME WHEN YOU DID NOT HAVE A STEADY PLACE TO SLEEP OR SLEPT IN A SHELTER (INCLUDING NOW)?: NO

## 2023-08-14 ASSESSMENT — ENCOUNTER SYMPTOMS
SHORTNESS OF BREATH: 0
ABDOMINAL PAIN: 0

## 2023-08-15 PROBLEM — H04.123 DRY EYES: Status: ACTIVE | Noted: 2023-08-15

## 2023-08-16 NOTE — ASSESSMENT & PLAN NOTE
Labs pending. Will review when available. Continue metformin ER 1500 mg daily and glipizide 2.5 mg twice daily.

## 2023-08-16 NOTE — ASSESSMENT & PLAN NOTE
Based on April labs, Dr. Santino Chaney recently increased her Crestor to 20 mg daily but lipid panel ordered for October.

## 2023-08-16 NOTE — ASSESSMENT & PLAN NOTE
Asymptomatic. Follows with Dr. Kimberly Eid -most recent appointment 8/1/2023. Remains on aspirin, lisinopril 40 mg daily, Cardizem  mg twice daily, Crestor 20 mg daily (dose increased at 8/1/2023 office visit). Denies symptoms of chest pain. Was in the emergency room at [facility] with musculoskeletal chest wall pain with negative cardiac work-up.

## 2023-08-18 ENCOUNTER — TELEMEDICINE (OUTPATIENT)
Dept: INTERNAL MEDICINE CLINIC | Age: 73
End: 2023-08-18

## 2023-08-18 DIAGNOSIS — Z00.00 MEDICARE ANNUAL WELLNESS VISIT, SUBSEQUENT: Primary | ICD-10-CM

## 2023-08-18 ASSESSMENT — PATIENT HEALTH QUESTIONNAIRE - PHQ9
1. LITTLE INTEREST OR PLEASURE IN DOING THINGS: 0
SUM OF ALL RESPONSES TO PHQ9 QUESTIONS 1 & 2: 0
SUM OF ALL RESPONSES TO PHQ QUESTIONS 1-9: 0
2. FEELING DOWN, DEPRESSED OR HOPELESS: 0
SUM OF ALL RESPONSES TO PHQ QUESTIONS 1-9: 0

## 2023-08-18 ASSESSMENT — LIFESTYLE VARIABLES: HOW OFTEN DO YOU HAVE A DRINK CONTAINING ALCOHOL: NEVER
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
## 2023-08-18 NOTE — PATIENT INSTRUCTIONS
Personalized Preventive Plan for Berl Shadow - 8/18/2023  Medicare offers a range of preventive health benefits. Some of the tests and screenings are paid in full while other may be subject to a deductible, co-insurance, and/or copay. Some of these benefits include a comprehensive review of your medical history including lifestyle, illnesses that may run in your family, and various assessments and screenings as appropriate. After reviewing your medical record and screening and assessments performed today your provider may have ordered immunizations, labs, imaging, and/or referrals for you. A list of these orders (if applicable) as well as your Preventive Care list are included within your After Visit Summary for your review. Other Preventive Recommendations:    A preventive eye exam performed by an eye specialist is recommended every 1-2 years to screen for glaucoma; cataracts, macular degeneration, and other eye disorders. A preventive dental visit is recommended every 6 months. Try to get at least 150 minutes of exercise per week or 10,000 steps per day on a pedometer . Order or download the FREE \"Exercise & Physical Activity: Your Everyday Guide\" from [employer]. Call 5-520.813.3193 or search [employer] online. You need 6230-2982 mg of calcium and 6347-5528 IU of vitamin D per day. It is possible to meet your calcium requirement with diet alone, but a vitamin D supplement is usually necessary to meet this goal.  When exposed to the sun, use a sunscreen that protects against both UVA and UVB radiation with an SPF of 30 or greater. Reapply every 2 to 3 hours or after sweating, drying off with a towel, or swimming. Always wear a seat belt when traveling in a car. Always wear a helmet when riding a bicycle or motorcycle.

## 2023-08-30 RX ORDER — METFORMIN HYDROCHLORIDE 500 MG/1
TABLET, EXTENDED RELEASE ORAL
Qty: 360 TABLET | OUTPATIENT
Start: 2023-08-30

## 2023-09-09 NOTE — PATIENT INSTRUCTIONS
Personalized Preventive Plan for Kimo Watson - 3/2/2021  Medicare offers a range of preventive health benefits. Some of the tests and screenings are paid in full while other may be subject to a deductible, co-insurance, and/or copay. Some of these benefits include a comprehensive review of your medical history including lifestyle, illnesses that may run in your family, and various assessments and screenings as appropriate. After reviewing your medical record and screening and assessments performed today your provider may have ordered immunizations, labs, imaging, and/or referrals for you. A list of these orders (if applicable) as well as your Preventive Care list are included within your After Visit Summary for your review. Other Preventive Recommendations:    · A preventive eye exam performed by an eye specialist is recommended every 1-2 years to screen for glaucoma; cataracts, macular degeneration, and other eye disorders. · A preventive dental visit is recommended every 6 months. · Try to get at least 150 minutes of exercise per week or 10,000 steps per day on a pedometer . · Order or download the FREE \"Exercise & Physical Activity: Your Everyday Guide\" from [employer]. Call 9-939.648.7642 or search [employer] online. · You need 0254-0938 mg of calcium and 0000-5991 IU of vitamin D per day. It is possible to meet your calcium requirement with diet alone, but a vitamin D supplement is usually necessary to meet this goal.  · When exposed to the sun, use a sunscreen that protects against both UVA and UVB radiation with an SPF of 30 or greater. Reapply every 2 to 3 hours or after sweating, drying off with a towel, or swimming. · Always wear a seat belt when traveling in a car. Always wear a helmet when riding a bicycle or motorcycle. Heart-Healthy Diet   Sodium, Fat, and Cholesterol Controlled Diet       What Is a Heart Healthy Diet? A heart-healthy diet is one that limits sodium , certain types of fat , and cholesterol . This type of diet is recommended for:   People with any form of cardiovascular disease (eg, coronary heart disease , peripheral vascular disease , previous heart attack , previous stroke )   People with risk factors for cardiovascular disease, such as high blood pressure , high cholesterol , or diabetes   Anyone who wants to lower their risk of developing cardiovascular disease   Sodium    Sodium is a mineral found in many foods. In general, most people consume much more sodium than they need. Diets high in sodium can increase blood pressure and lead to edema (water retention). On a heart-healthy diet, you should consume no more than 2,300 mg (milligrams) of sodium per dayabout the amount in one teaspoon of table salt. The foods highest in sodium include table salt (about 50% sodium), processed foods, convenience foods, and preserved foods. Cholesterol    Cholesterol is a fat-like, waxy substance in your blood. Our bodies make some cholesterol. It is also found in animal products, with the highest amounts in fatty meat, egg yolks, whole milk, cheese, shellfish, and organ meats. On a heart-healthy diet, you should limit your cholesterol intake to less than 200 mg per day. It is normal and important to have some cholesterol in your bloodstream. But too much cholesterol can cause plaque to build up within your arteries, which can eventually lead to a heart attack or stroke. The two types of cholesterol that are most commonly referred to are:   Low-density lipoprotein (LDL) cholesterol  Also known as bad cholesterol, this is the cholesterol that tends to build up along your arteries. Bad cholesterol levels are increased by eating fats that are saturated or hydrogenated. Optimal level of this cholesterol is less than 100. Over 130 starts to get risky for heart disease. High-density lipoprotein (HDL) cholesterol  Also known as good cholesterol, this type of cholesterol actually carries cholesterol away from your arteries and may, therefore, help lower your risk of having a heart attack. You want this level to be high (ideally greater than 60). It is a risk to have a level less than 40. You can raise this good cholesterol by eating olive oil, canola oil, avocados, or nuts. Exercise raises this level, too. Fat    Fat is calorie dense and packs a lot of calories into a small amount of food. Even though fats should be limited due to their high calorie content, not all fats are bad. In fact, some fats are quite healthful. Fat can be broken down into four main types.    The good-for-you fats are:   Monounsaturated fat  found in oils such as olive and canola, avocados, and nuts and natural nut butters; can decrease cholesterol levels, while keeping levels of HDL cholesterol high   Polyunsaturated fat  found in oils such as safflower, sunflower, soybean, corn, and sesame; can decrease total cholesterol and LDL cholesterol   Omega-3 fatty acids  particularly those found in fatty fish (such as salmon, trout, tuna, mackerel, herring, and sardines); can decrease risk of arrhythmias, decrease triglyceride levels, and slightly lower blood pressure   The fats that you want to limit are:   Saturated fat  found in animal products, many fast foods, and a few vegetables; increases total blood cholesterol, including LDL levels   Animal fats that are saturated include: butter, lard, whole-milk dairy products, meat fat, and poultry skin   Vegetable fats that are saturated include: hydrogenated shortening, palm oil, coconut oil, cocoa butter   Hydrogenated or trans fat  found in margarine and vegetable shortening, most shelf stable snack foods, and fried foods; increases LDL and decreases HDL It is generally recommended that you limit your total fat for the day to less than 30% of your total calories. If you follow an 1800-calorie heart healthy diet, for example, this would mean 60 grams of fat or less per day. Saturated fat and trans fat in your diet raises your blood cholesterol the most, much more than dietary cholesterol does. For this reason, on a heart-healthy diet, less than 7% of your calories should come from saturated fat and ideally 0% from trans fat. On an 1800-calorie diet, this translates into less than 14 grams of saturated fat per day, leaving 46 grams of fat to come from mono- and polyunsaturated fats.    Food Choices on a Heart Healthy Diet   Food Category   Foods Recommended   Foods to Avoid   Grains   Breads and rolls without salted tops Most dry and cooked cereals Unsalted crackers and breadsticks Low-sodium or homemade breadcrumbs or stuffing All rice and pastas   Breads, rolls, and crackers with salted tops High-fat baked goods (eg, muffins, donuts, pastries) Quick breads, self-rising flour, and biscuit mixes Regular bread crumbs Instant hot cereals Commercially prepared rice, pasta, or stuffing mixes   Vegetables   Most fresh, frozen, and low-sodium canned vegetables Low-sodium and salt-free vegetable juices Canned vegetables if unsalted or rinsed   Regular canned vegetables and juices, including sauerkraut and pickled vegetables Frozen vegetables with sauces Commercially prepared potato and vegetable mixes   Fruits   Most fresh, frozen, and canned fruits All fruit juices   Fruits processed with salt or sodium   Milk   Nonfat or low-fat (1%) milk Nonfat or low-fat yogurt Cottage cheese, low-fat ricotta, cheeses labeled as low-fat and low-sodium   Whole milk Reduced-fat (2%) milk Malted and chocolate milk Full fat yogurt Most cheeses (unless low-fat and low salt) Buttermilk (no more than 1 cup per week)   Meats and Beans Lean cuts of fresh or frozen beef, veal, lamb, or pork (look for the word loin) Fresh or frozen poultry without the skin Fresh or frozen fish and some shellfish Egg whites and egg substitutes (Limit whole eggs to three per week) Tofu Nuts or seeds (unsalted, dry-roasted), low-sodium peanut butter Dried peas, beans, and lentils   Any smoked, cured, salted, or canned meat, fish, or poultry (including reyes, chipped beef, cold cuts, hot dogs, sausages, sardines, and anchovies) Poultry skins Breaded and/or fried fish or meats Canned peas, beans, and lentils Salted nuts   Fats and Oils   Olive oil and canola oil Low-sodium, low-fat salad dressings and mayonnaise   Butter, margarine, coconut and palm oils, reyes fat   Snacks, Sweets, and Condiments   Low-sodium or unsalted versions of broths, soups, soy sauce, and condiments Pepper, herbs, and spices; vinegar, lemon, or lime juice Low-fat frozen desserts (yogurt, sherbet, fruit bars) Sugar, cocoa powder, honey, syrup, jam, and preserves Low-fat, trans-fat free cookies, cakes, and pies Enrique and animal crackers, fig bars, hanh snaps   High-fat desserts Broth, soups, gravies, and sauces, made from instant mixes or other high-sodium ingredients Salted snack foods Canned olives Meat tenderizers, seasoning salt, and most flavored vinegars   Beverages   Low-sodium carbonated beverages Tea and coffee in moderation Soy milk   Commercially softened water   Suggestions   Make whole grains, fruits, and vegetables the base of your diet. Choose heart-healthy fats such as canola, olive, and flaxseed oil, and foods high in heart-healthy fats, such as nuts, seeds, soybeans, tofu, and fish. Eat fish at least twice per week; the fish highest in omega-3 fatty acids and lowest in mercury include salmon, herring, mackerel, sardines, and canned chunk light tuna. If you eat fish less than twice per week or have high triglycerides, talk to your doctor about taking fish oil supplements. Read food labels. For products low in fat and cholesterol, look for fat free, low-fat, cholesterol free, saturated fat free, and trans fat freeAlso scan the Nutrition Facts Label, which lists saturated fat, trans fat, and cholesterol amounts. For products low in sodium, look for sodium free, very low sodium, low sodium, no added salt, and unsalted   Skip the salt when cooking or at the table; if food needs more flavor, get creative and try out different herbs and spices. Garlic and onion also add substantial flavor to foods. Trim any visible fat off meat and poultry before cooking, and drain the fat off after alvarez. Use cooking methods that require little or no added fat, such as grilling, boiling, baking, poaching, broiling, roasting, steaming, stir-frying, and sauting. Avoid fast food and convenience food. They tend to be high in saturated and trans fat and have a lot of added salt. Talk to a registered dietitian for individualized diet advice.       Last Reviewed: March 2011 Kervin Santos MS, MPH, RD   Updated: 3/29/2011   · Single artery umbilical cord/Gestational Age less than 36 Weeks/Other

## 2023-09-22 ENCOUNTER — TELEPHONE (OUTPATIENT)
Dept: INTERNAL MEDICINE CLINIC | Age: 73
End: 2023-09-22

## 2023-09-22 NOTE — TELEPHONE ENCOUNTER
Pt called stating that she got a call from what looked like our office and says to be connected with the office to press \"1.\" She states that she thought this was strange and decided to call in herself to see what was needed. I didn't see anything in her chart with results, upcoming appts, etc.. I told her that I'm not sure why anyone from here would be calling.

## 2023-12-13 ENCOUNTER — OFFICE VISIT (OUTPATIENT)
Dept: INTERNAL MEDICINE CLINIC | Age: 73
End: 2023-12-13

## 2023-12-13 VITALS
WEIGHT: 137.6 LBS | BODY MASS INDEX: 23.49 KG/M2 | DIASTOLIC BLOOD PRESSURE: 70 MMHG | SYSTOLIC BLOOD PRESSURE: 148 MMHG | OXYGEN SATURATION: 98 % | HEART RATE: 80 BPM | HEIGHT: 64 IN

## 2023-12-13 DIAGNOSIS — R80.9 MICROALBUMINURIA DUE TO TYPE 2 DIABETES MELLITUS (HCC): ICD-10-CM

## 2023-12-13 DIAGNOSIS — R68.2 DRY MOUTH: ICD-10-CM

## 2023-12-13 DIAGNOSIS — E11.29 MICROALBUMINURIA DUE TO TYPE 2 DIABETES MELLITUS (HCC): ICD-10-CM

## 2023-12-13 DIAGNOSIS — E11.9 TYPE 2 DIABETES MELLITUS WITHOUT COMPLICATION, WITHOUT LONG-TERM CURRENT USE OF INSULIN (HCC): ICD-10-CM

## 2023-12-13 DIAGNOSIS — H04.123 DRY EYES: ICD-10-CM

## 2023-12-13 DIAGNOSIS — R35.1 NOCTURIA: ICD-10-CM

## 2023-12-13 DIAGNOSIS — I25.10 CORONARY ARTERY DISEASE INVOLVING NATIVE CORONARY ARTERY OF NATIVE HEART WITHOUT ANGINA PECTORIS: ICD-10-CM

## 2023-12-13 DIAGNOSIS — E78.5 HYPERLIPIDEMIA LDL GOAL <70: ICD-10-CM

## 2023-12-13 DIAGNOSIS — I10 PRIMARY HYPERTENSION: Primary | ICD-10-CM

## 2023-12-13 NOTE — PROGRESS NOTES
and oriented to person, place, and time. Psychiatric:         Mood and Affect: Mood normal.         Behavior: Behavior normal. Behavior is cooperative. ASSESSMENT/PLAN:    Problem List Items Addressed This Visit       Coronary artery disease involving native coronary artery of native heart without angina pectoris      Asymptomatic. Follows with Dr. Santino Chaney -most recent appointment 8/1/2023. Remains on aspirin, lisinopril 40 mg daily, Cardizem  mg twice daily, Crestor 20 mg daily (dose increased at 8/1/2023 office visit). Denies symptoms of chest pain. Dry eyes      SSA and SSB ordered at last visit. She will get these labs drawn today. Dry mouth      Possibly related to dehydration. SSA and SSB ordered at previous appointment but have not yet been drawn. HTN (hypertension) - Primary      Elevated today but typically well-controlled. Continue diltiazem  mg twice daily and lisinopril 20 mg twice daily. Hyperlipidemia LDL goal <70      Due for labs. Remains on Crestor 20 mg daily. Check Lipid panel, CMP. Microalbuminuria due to type 2 diabetes mellitus ([address])      Urine for microalbumin. Ettie Putty has been recommended in the past but is cost prohibitive. Relevant Orders    Microalbumin / Creatinine Urine Ratio    Nocturia      Referral placed to urology. Relevant Orders    [facility] - Antonio Brito MD, Urology, [facility]    Urinalysis    Type 2 diabetes mellitus without complication, without long-term current use of insulin ([address])     Due for labs. Continue metformin ER 1500 mg daily and glipizide 2.5 mg twice daily.          Relevant Orders    Microalbumin / Creatinine Urine Ratio    Comprehensive Metabolic Panel    Hemoglobin A1C    CBC with Auto Differential       Current Outpatient Medications   Medication Sig Dispense Refill    rosuvastatin (CRESTOR) 20 MG tablet Take 1 tablet by mouth daily 90 tablet 3    glipiZIDE (GLUCOTROL XL) 2.5

## 2023-12-13 NOTE — PATIENT INSTRUCTIONS
Start Myrbetriq 25 mg 1 pill daily. If you do not notice a decrease in urinary frequency over the next month, please then make an appointment with urology (referral placed at your appointment today). Use diclofenac gel (Voltaren gel) as needed for localized areas of pain. For smaller areas (hands, wrists, elbows, feet, and ankles)- use about 1 finger tip's worth of cream. For larger areas, squeeze cream from your middle knuckle to your finger tip. You may apply diclofenac gel up to 4 times daily as needed. For your jaw pain, please try using a finger tip amount of diclofenac gel over your jaw and ear.

## 2023-12-14 DIAGNOSIS — E11.9 TYPE 2 DIABETES MELLITUS WITHOUT COMPLICATION, WITHOUT LONG-TERM CURRENT USE OF INSULIN (HCC): ICD-10-CM

## 2023-12-14 DIAGNOSIS — R68.2 DRY MOUTH: ICD-10-CM

## 2023-12-14 DIAGNOSIS — E11.29 MICROALBUMINURIA DUE TO TYPE 2 DIABETES MELLITUS (HCC): ICD-10-CM

## 2023-12-14 DIAGNOSIS — R35.1 NOCTURIA: ICD-10-CM

## 2023-12-14 DIAGNOSIS — E78.5 HYPERLIPIDEMIA LDL GOAL <70: ICD-10-CM

## 2023-12-14 DIAGNOSIS — R80.9 MICROALBUMINURIA DUE TO TYPE 2 DIABETES MELLITUS (HCC): ICD-10-CM

## 2023-12-14 DIAGNOSIS — H04.123 DRY EYES: ICD-10-CM

## 2023-12-14 DIAGNOSIS — I10 PRIMARY HYPERTENSION: ICD-10-CM

## 2023-12-14 LAB
ALBUMIN SERPL-MCNC: 4.7 G/DL (ref 3.4–5)
ALBUMIN/GLOB SERPL: 2 {RATIO} (ref 1.1–2.2)
ALP SERPL-CCNC: 85 U/L (ref 40–129)
ALT SERPL-CCNC: 13 U/L (ref 10–40)
ANION GAP SERPL CALCULATED.3IONS-SCNC: 11 MMOL/L (ref 3–16)
AST SERPL-CCNC: 14 U/L (ref 15–37)
BACTERIA URNS QL MICRO: NORMAL /HPF
BASOPHILS # BLD: 0 K/UL (ref 0–0.2)
BASOPHILS NFR BLD: 0.5 %
BILIRUB SERPL-MCNC: 0.4 MG/DL (ref 0–1)
BILIRUB UR QL STRIP.AUTO: NEGATIVE
BUN SERPL-MCNC: 10 MG/DL (ref 7–20)
CALCIUM SERPL-MCNC: 9.2 MG/DL (ref 8.3–10.6)
CHLORIDE SERPL-SCNC: 99 MMOL/L (ref 99–110)
CLARITY UR: CLEAR
CO2 SERPL-SCNC: 26 MMOL/L (ref 21–32)
COLOR UR: YELLOW
CREAT SERPL-MCNC: <0.5 MG/DL (ref 0.6–1.2)
CREAT UR-MCNC: 43.8 MG/DL (ref 28–259)
DEPRECATED RDW RBC AUTO: 13.2 % (ref 12.4–15.4)
EOSINOPHIL # BLD: 0.1 K/UL (ref 0–0.6)
EOSINOPHIL NFR BLD: 1.5 %
EPI CELLS #/AREA URNS AUTO: 1 /HPF (ref 0–5)
GFR SERPLBLD CREATININE-BSD FMLA CKD-EPI: >60 ML/MIN/{1.73_M2}
GLUCOSE SERPL-MCNC: 166 MG/DL (ref 70–99)
GLUCOSE UR STRIP.AUTO-MCNC: NEGATIVE MG/DL
HCT VFR BLD AUTO: 40.1 % (ref 36–48)
HGB BLD-MCNC: 13.6 G/DL (ref 12–16)
HGB UR QL STRIP.AUTO: ABNORMAL
HYALINE CASTS #/AREA URNS AUTO: 0 /LPF (ref 0–8)
KETONES UR STRIP.AUTO-MCNC: NEGATIVE MG/DL
LEUKOCYTE ESTERASE UR QL STRIP.AUTO: NEGATIVE
LYMPHOCYTES # BLD: 1.2 K/UL (ref 1–5.1)
LYMPHOCYTES NFR BLD: 24.5 %
MCH RBC QN AUTO: 30.7 PG (ref 26–34)
MCHC RBC AUTO-ENTMCNC: 33.9 G/DL (ref 31–36)
MCV RBC AUTO: 90.7 FL (ref 80–100)
MICROALBUMIN UR DL<=1MG/L-MCNC: 1.7 MG/DL
MICROALBUMIN/CREAT UR: 38.8 MG/G (ref 0–30)
MONOCYTES # BLD: 0.3 K/UL (ref 0–1.3)
MONOCYTES NFR BLD: 6.4 %
NEUTROPHILS # BLD: 3.3 K/UL (ref 1.7–7.7)
NEUTROPHILS NFR BLD: 67.1 %
NITRITE UR QL STRIP.AUTO: NEGATIVE
PH UR STRIP.AUTO: 6.5 [PH] (ref 5–8)
PLATELET # BLD AUTO: 205 K/UL (ref 135–450)
PMV BLD AUTO: 8.8 FL (ref 5–10.5)
POTASSIUM SERPL-SCNC: 4 MMOL/L (ref 3.5–5.1)
PROT SERPL-MCNC: 7.1 G/DL (ref 6.4–8.2)
PROT UR STRIP.AUTO-MCNC: NEGATIVE MG/DL
RBC # BLD AUTO: 4.42 M/UL (ref 4–5.2)
RBC CLUMPS #/AREA URNS AUTO: 1 /HPF (ref 0–4)
SODIUM SERPL-SCNC: 136 MMOL/L (ref 136–145)
SP GR UR STRIP.AUTO: 1.01 (ref 1–1.03)
UA COMPLETE W REFLEX CULTURE PNL UR: ABNORMAL
UA DIPSTICK W REFLEX MICRO PNL UR: YES
URN SPEC COLLECT METH UR: ABNORMAL
UROBILINOGEN UR STRIP-ACNC: 0.2 E.U./DL
WBC # BLD AUTO: 4.9 K/UL (ref 4–11)
WBC #/AREA URNS AUTO: 0 /HPF (ref 0–5)

## 2023-12-15 LAB
ENA SS-A AB SER IA-ACNC: <0.2 AI (ref 0–0.9)
ENA SS-B AB SER IA-ACNC: <0.2 AI (ref 0–0.9)
EST. AVERAGE GLUCOSE BLD GHB EST-MCNC: 171.4 MG/DL
HBA1C MFR BLD: 7.6 %

## 2024-01-24 PROBLEM — Z82.49 FAMILY HISTORY OF HEART DISEASE: Status: ACTIVE | Noted: 2024-01-24

## 2024-02-05 ENCOUNTER — TELEPHONE (OUTPATIENT)
Dept: CARDIOLOGY CLINIC | Age: 74
End: 2024-02-05

## 2024-02-05 NOTE — TELEPHONE ENCOUNTER
Pt called to ask a question, she's wanting to know when she goes to [place] for the stress test on 02/12 and she have a heart attack is the facility equipped to handle that there.  Please call to discuss this matter.  Thank you

## 2024-02-05 NOTE — TELEPHONE ENCOUNTER
Spoke with patient to let her know doctor will be on site when she has her stress test.     Patient would like to know if she should take her diltiazem the morning of her stress test?  Please advise.

## 2024-02-06 NOTE — TELEPHONE ENCOUNTER
Informed Linn she can take her medications with a small amount of water. Pt verbalizes understanding.

## 2024-02-19 ENCOUNTER — OFFICE VISIT (OUTPATIENT)
Dept: CARDIOLOGY CLINIC | Age: 74
End: 2024-02-19
Payer: MEDICARE

## 2024-02-19 VITALS
WEIGHT: 137.5 LBS | HEIGHT: 64 IN | OXYGEN SATURATION: 96 % | SYSTOLIC BLOOD PRESSURE: 158 MMHG | DIASTOLIC BLOOD PRESSURE: 72 MMHG | HEART RATE: 91 BPM | BODY MASS INDEX: 23.47 KG/M2

## 2024-02-19 DIAGNOSIS — I10 PRIMARY HYPERTENSION: ICD-10-CM

## 2024-02-19 DIAGNOSIS — I25.10 CORONARY ARTERY DISEASE INVOLVING NATIVE CORONARY ARTERY OF NATIVE HEART WITHOUT ANGINA PECTORIS: Primary | ICD-10-CM

## 2024-02-19 DIAGNOSIS — E78.5 HYPERLIPIDEMIA LDL GOAL <70: ICD-10-CM

## 2024-02-19 DIAGNOSIS — Z82.49 FAMILY HISTORY OF HEART DISEASE: ICD-10-CM

## 2024-02-19 PROCEDURE — 3078F DIAST BP <80 MM HG: CPT | Performed by: INTERNAL MEDICINE

## 2024-02-19 PROCEDURE — 1036F TOBACCO NON-USER: CPT | Performed by: INTERNAL MEDICINE

## 2024-02-19 PROCEDURE — G8484 FLU IMMUNIZE NO ADMIN: HCPCS | Performed by: INTERNAL MEDICINE

## 2024-02-19 PROCEDURE — 1123F ACP DISCUSS/DSCN MKR DOCD: CPT | Performed by: INTERNAL MEDICINE

## 2024-02-19 PROCEDURE — 3017F COLORECTAL CA SCREEN DOC REV: CPT | Performed by: INTERNAL MEDICINE

## 2024-02-19 PROCEDURE — G8399 PT W/DXA RESULTS DOCUMENT: HCPCS | Performed by: INTERNAL MEDICINE

## 2024-02-19 PROCEDURE — G8420 CALC BMI NORM PARAMETERS: HCPCS | Performed by: INTERNAL MEDICINE

## 2024-02-19 PROCEDURE — 3077F SYST BP >= 140 MM HG: CPT | Performed by: INTERNAL MEDICINE

## 2024-02-19 PROCEDURE — 99214 OFFICE O/P EST MOD 30 MIN: CPT | Performed by: INTERNAL MEDICINE

## 2024-02-19 PROCEDURE — G8427 DOCREV CUR MEDS BY ELIG CLIN: HCPCS | Performed by: INTERNAL MEDICINE

## 2024-02-19 PROCEDURE — 1090F PRES/ABSN URINE INCON ASSESS: CPT | Performed by: INTERNAL MEDICINE

## 2024-02-19 NOTE — PATIENT INSTRUCTIONS
Follow up with PCP regarding uncontrolled blood glucose    Stress echo as soon as possible    Follow up in 6 months
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
## 2024-02-22 DIAGNOSIS — E11.9 TYPE 2 DIABETES MELLITUS WITHOUT COMPLICATION, WITHOUT LONG-TERM CURRENT USE OF INSULIN (HCC): ICD-10-CM

## 2024-02-23 RX ORDER — BLOOD SUGAR DIAGNOSTIC
STRIP MISCELLANEOUS
Qty: 100 STRIP | Refills: 3 | Status: SHIPPED | OUTPATIENT
Start: 2024-02-23

## 2024-03-04 ENCOUNTER — HOSPITAL ENCOUNTER (OUTPATIENT)
Dept: NON INVASIVE DIAGNOSTICS | Age: 74
Discharge: HOME | End: 2024-03-04
Payer: MEDICARE

## 2024-03-04 DIAGNOSIS — R07.9 CHEST PAIN, UNSPECIFIED TYPE: ICD-10-CM

## 2024-03-04 DIAGNOSIS — I25.10 CORONARY ARTERY DISEASE INVOLVING NATIVE CORONARY ARTERY OF NATIVE HEART WITHOUT ANGINA PECTORIS: ICD-10-CM

## 2024-03-04 DIAGNOSIS — E78.5 HYPERLIPIDEMIA LDL GOAL <70: ICD-10-CM

## 2024-03-04 DIAGNOSIS — I10 PRIMARY HYPERTENSION: ICD-10-CM

## 2024-03-04 PROCEDURE — 93321 DOPPLER ECHO F-UP/LMTD STD: CPT

## 2024-03-04 PROCEDURE — 93325 DOPPLER ECHO COLOR FLOW MAPG: CPT

## 2024-03-04 PROCEDURE — 93351 STRESS TTE COMPLETE: CPT

## 2024-04-15 ENCOUNTER — OFFICE VISIT (OUTPATIENT)
Dept: INTERNAL MEDICINE CLINIC | Age: 74
End: 2024-04-15
Payer: MEDICARE

## 2024-04-15 ENCOUNTER — TELEPHONE (OUTPATIENT)
Dept: PHARMACY | Facility: CLINIC | Age: 74
End: 2024-04-15

## 2024-04-15 ENCOUNTER — ENROLLMENT (OUTPATIENT)
Dept: PHARMACY | Facility: CLINIC | Age: 74
End: 2024-04-15

## 2024-04-15 VITALS
DIASTOLIC BLOOD PRESSURE: 72 MMHG | SYSTOLIC BLOOD PRESSURE: 156 MMHG | HEART RATE: 93 BPM | BODY MASS INDEX: 24.04 KG/M2 | WEIGHT: 140.8 LBS | HEIGHT: 64 IN | OXYGEN SATURATION: 96 %

## 2024-04-15 DIAGNOSIS — R80.9 MICROALBUMINURIA DUE TO TYPE 2 DIABETES MELLITUS (HCC): ICD-10-CM

## 2024-04-15 DIAGNOSIS — K21.9 GASTROESOPHAGEAL REFLUX DISEASE, UNSPECIFIED WHETHER ESOPHAGITIS PRESENT: ICD-10-CM

## 2024-04-15 DIAGNOSIS — E11.9 TYPE 2 DIABETES MELLITUS WITHOUT COMPLICATION, WITHOUT LONG-TERM CURRENT USE OF INSULIN (HCC): Primary | ICD-10-CM

## 2024-04-15 DIAGNOSIS — H91.93 BILATERAL HEARING LOSS, UNSPECIFIED HEARING LOSS TYPE: ICD-10-CM

## 2024-04-15 DIAGNOSIS — E55.9 VITAMIN D INSUFFICIENCY: ICD-10-CM

## 2024-04-15 DIAGNOSIS — E78.5 HYPERLIPIDEMIA LDL GOAL <70: ICD-10-CM

## 2024-04-15 DIAGNOSIS — E11.9 TYPE 2 DIABETES MELLITUS WITHOUT COMPLICATION, WITHOUT LONG-TERM CURRENT USE OF INSULIN (HCC): ICD-10-CM

## 2024-04-15 DIAGNOSIS — I10 PRIMARY HYPERTENSION: ICD-10-CM

## 2024-04-15 DIAGNOSIS — I25.10 CORONARY ARTERY DISEASE INVOLVING NATIVE CORONARY ARTERY OF NATIVE HEART WITHOUT ANGINA PECTORIS: ICD-10-CM

## 2024-04-15 DIAGNOSIS — E11.29 MICROALBUMINURIA DUE TO TYPE 2 DIABETES MELLITUS (HCC): ICD-10-CM

## 2024-04-15 DIAGNOSIS — E11.65 TYPE 2 DIABETES MELLITUS WITH HYPERGLYCEMIA, WITHOUT LONG-TERM CURRENT USE OF INSULIN (HCC): ICD-10-CM

## 2024-04-15 DIAGNOSIS — Z79.899 MEDICATION MANAGEMENT: ICD-10-CM

## 2024-04-15 DIAGNOSIS — R30.0 DYSURIA: ICD-10-CM

## 2024-04-15 DIAGNOSIS — R68.2 DRY MOUTH: ICD-10-CM

## 2024-04-15 LAB
BASOPHILS # BLD: 0 K/UL (ref 0–0.2)
BASOPHILS NFR BLD: 0.4 %
BILIRUBIN, POC: ABNORMAL
BLOOD URINE, POC: ABNORMAL
CLARITY, POC: ABNORMAL
COLOR, POC: ABNORMAL
DEPRECATED RDW RBC AUTO: 13.1 % (ref 12.4–15.4)
EOSINOPHIL # BLD: 0.1 K/UL (ref 0–0.6)
EOSINOPHIL NFR BLD: 1 %
GLUCOSE URINE, POC: ABNORMAL
HBA1C MFR BLD: 8.1 %
HCT VFR BLD AUTO: 38 % (ref 36–48)
HGB BLD-MCNC: 12.8 G/DL (ref 12–16)
KETONES, POC: ABNORMAL
LEUKOCYTE EST, POC: ABNORMAL
LYMPHOCYTES # BLD: 1.4 K/UL (ref 1–5.1)
LYMPHOCYTES NFR BLD: 26 %
MCH RBC QN AUTO: 31.5 PG (ref 26–34)
MCHC RBC AUTO-ENTMCNC: 33.7 G/DL (ref 31–36)
MCV RBC AUTO: 93.4 FL (ref 80–100)
MONOCYTES # BLD: 0.3 K/UL (ref 0–1.3)
MONOCYTES NFR BLD: 5.5 %
NEUTROPHILS # BLD: 3.7 K/UL (ref 1.7–7.7)
NEUTROPHILS NFR BLD: 67.1 %
NITRITE, POC: ABNORMAL
PH, POC: 6
PLATELET # BLD AUTO: 221 K/UL (ref 135–450)
PMV BLD AUTO: 9.3 FL (ref 5–10.5)
PROTEIN, POC: ABNORMAL
RBC # BLD AUTO: 4.06 M/UL (ref 4–5.2)
SPECIFIC GRAVITY, POC: 1.02
UROBILINOGEN, POC: ABNORMAL
WBC # BLD AUTO: 5.6 K/UL (ref 4–11)

## 2024-04-15 PROCEDURE — 83036 HEMOGLOBIN GLYCOSYLATED A1C: CPT | Performed by: INTERNAL MEDICINE

## 2024-04-15 PROCEDURE — 3052F HG A1C>EQUAL 8.0%<EQUAL 9.0%: CPT | Performed by: INTERNAL MEDICINE

## 2024-04-15 PROCEDURE — 3017F COLORECTAL CA SCREEN DOC REV: CPT | Performed by: INTERNAL MEDICINE

## 2024-04-15 PROCEDURE — G8427 DOCREV CUR MEDS BY ELIG CLIN: HCPCS | Performed by: INTERNAL MEDICINE

## 2024-04-15 PROCEDURE — 3078F DIAST BP <80 MM HG: CPT | Performed by: INTERNAL MEDICINE

## 2024-04-15 PROCEDURE — 1090F PRES/ABSN URINE INCON ASSESS: CPT | Performed by: INTERNAL MEDICINE

## 2024-04-15 PROCEDURE — 2022F DILAT RTA XM EVC RTNOPTHY: CPT | Performed by: INTERNAL MEDICINE

## 2024-04-15 PROCEDURE — 3077F SYST BP >= 140 MM HG: CPT | Performed by: INTERNAL MEDICINE

## 2024-04-15 PROCEDURE — 99215 OFFICE O/P EST HI 40 MIN: CPT | Performed by: INTERNAL MEDICINE

## 2024-04-15 PROCEDURE — G8399 PT W/DXA RESULTS DOCUMENT: HCPCS | Performed by: INTERNAL MEDICINE

## 2024-04-15 PROCEDURE — G8420 CALC BMI NORM PARAMETERS: HCPCS | Performed by: INTERNAL MEDICINE

## 2024-04-15 PROCEDURE — 1123F ACP DISCUSS/DSCN MKR DOCD: CPT | Performed by: INTERNAL MEDICINE

## 2024-04-15 PROCEDURE — 81002 URINALYSIS NONAUTO W/O SCOPE: CPT | Performed by: INTERNAL MEDICINE

## 2024-04-15 PROCEDURE — 1036F TOBACCO NON-USER: CPT | Performed by: INTERNAL MEDICINE

## 2024-04-15 RX ORDER — PANTOPRAZOLE SODIUM 40 MG/1
40 TABLET, DELAYED RELEASE ORAL
Qty: 30 TABLET | Refills: 3 | Status: SHIPPED | OUTPATIENT
Start: 2024-04-15

## 2024-04-15 SDOH — ECONOMIC STABILITY: FOOD INSECURITY: WITHIN THE PAST 12 MONTHS, YOU WORRIED THAT YOUR FOOD WOULD RUN OUT BEFORE YOU GOT MONEY TO BUY MORE.: NEVER TRUE

## 2024-04-15 SDOH — ECONOMIC STABILITY: INCOME INSECURITY: HOW HARD IS IT FOR YOU TO PAY FOR THE VERY BASICS LIKE FOOD, HOUSING, MEDICAL CARE, AND HEATING?: NOT HARD AT ALL

## 2024-04-15 SDOH — ECONOMIC STABILITY: FOOD INSECURITY: WITHIN THE PAST 12 MONTHS, THE FOOD YOU BOUGHT JUST DIDN'T LAST AND YOU DIDN'T HAVE MONEY TO GET MORE.: NEVER TRUE

## 2024-04-15 ASSESSMENT — PATIENT HEALTH QUESTIONNAIRE - PHQ9
1. LITTLE INTEREST OR PLEASURE IN DOING THINGS: NOT AT ALL
SUM OF ALL RESPONSES TO PHQ QUESTIONS 1-9: 0
2. FEELING DOWN, DEPRESSED OR HOPELESS: NOT AT ALL
SUM OF ALL RESPONSES TO PHQ9 QUESTIONS 1 & 2: 0
SUM OF ALL RESPONSES TO PHQ QUESTIONS 1-9: 0

## 2024-04-15 ASSESSMENT — ENCOUNTER SYMPTOMS
DIARRHEA: 0
SHORTNESS OF BREATH: 0
CONSTIPATION: 0
CHEST TIGHTNESS: 0

## 2024-04-15 NOTE — PATIENT INSTRUCTIONS
Liquid IV drink. Sold at [employer]. Similar to sports drink. Low in sugar.     Jardiance helps your blood sugars by causing your kidneys to filter out excess sugar into your urine. In doing this, it does increase your risk of bladder infections and yeast infections. To prevent this, please make sure that you drink lots of water, you urinate when you need to, and clean well using wet wipes (such as Cottonelle or Vern flushable wipes) following urination. You may also feel light headed as a result of this medication as it does cause you to urinate more and may help to lower your blood pressure. Please let me know if this is happening. Please drink plenty of water. This medication is very good as it will help you lose weight and is protective of your heart.     Please check you blood pressure 2-3 times per week at different times of the day. Please bring the readings and your blood pressure cuff with you to your next appointment. Goal blood pressure is under 135/85, ideal is in the 120's/80's and below.     Adding pantoprazole to help with possible stomach acid as part of your chest pain.

## 2024-04-15 NOTE — PROGRESS NOTES
Food Insecurity (4/15/2024)    Hunger Vital Sign     Worried About Running Out of Food in the Last Year: Never true     Ran Out of Food in the Last Year: Never true   Transportation Needs: Unknown (4/15/2024)    PRAPARE - Transportation     Lack of Transportation (Medical): Not on file     Lack of Transportation (Non-Medical): No   Physical Activity: Inactive (2023)    Exercise Vital Sign     Days of Exercise per Week: 0 days     Minutes of Exercise per Session: 0 min   Stress: Not on file   Social Connections: Not on file   Intimate Partner Violence: Not on file   Housing Stability: Unknown (4/15/2024)    Housing Stability Vital Sign     Unable to Pay for Housing in the Last Year: Not on file     Number of Places Lived in the Last Year: Not on file     Unstable Housing in the Last Year: No     Family History   Problem Relation Age of Onset    Heart Disease Mother     Cancer Father         ? lymphoma ( when patient was very young)    Diabetes Sister     Thyroid Disease Daughter         Outpatient Medications Prior to Visit   Medication Sig Dispense Refill    ACCU-CHEK GUIDE strip TEST BLOOD SUGAR ONCE DAILY AND AS NEEDED FOR SYMPTOMS OF IRREGULAR BLOOD GLUCOSE 100 strip 3    rosuvastatin (CRESTOR) 20 MG tablet Take 1 tablet by mouth daily 90 tablet 3    glipiZIDE (GLUCOTROL XL) 2.5 MG extended release tablet Take 1 tablet by mouth 2 times daily (with meals) with meals 180 tablet 3    diclofenac sodium (VOLTAREN) 1 % GEL Apply 2 g topically 4 times daily as needed for Pain 100 g 1    metFORMIN (GLUCOPHAGE-XR) 500 MG extended release tablet Take 3 tablets by mouth daily (with breakfast) 270 tablet 3    lisinopril (PRINIVIL;ZESTRIL) 20 MG tablet Take 1 tablet by mouth 2 times daily 180 tablet 3    dilTIAZem (TIAZAC) 240 MG extended release capsule Take 1 capsule by mouth 2 times daily 180 capsule 3    VITAMIN D PO Take by mouth      nitroGLYCERIN (NITROSTAT) 0.4 MG SL tablet Place 1 tablet under the tongue

## 2024-04-15 NOTE — TELEPHONE ENCOUNTER
[facility] CLINICAL PHARMACY: STATIN THERAPY REVIEW  Identified statin use in persons with diabetes care gap per [employer]. Records dated: 4/9/24.    ASSESSMENT  STATIN GAP IDENTIFIED    Per chart review, patient is prescribed rosuvastatin 20 mg. No claims yet through [employer] insurance in 2024. Last Rx 8/1/23 x 90 day supply 3 refills - believe should have active Rx at [facility] pharmacy. Dose was increased from 10 mg daily to 20 mg daily at 8/1/23 cardio OV.    Per Reconciled Dispense Report:   rosuvastatin 20 mg tablet 12/05/2023 90 90 tablet Paul Cuello MD [facility] [id number]...   rosuvastatin 20 mg tablet 08/01/2023 90 90 tablet Paul Cuello MD [facility] [id number]...   rosuvastatin 10 mg tablet 07[facility]22/2023 90 [facility] [id number]...   rosuvastatin 10 mg tablet 12/05/2022 90 90 tablet Lyssa Barr [facility] [id number]...   rosuvastatin 10 mg tablet 09/10/2022 90 [facility] [id number]...   rosuvastatin 10 mg tablet 06/11/2022 90 [facility] [id number]...   rosuvastatin 10 mg tablet 03/02/2022 90 90 [facility] [id number]...     Lab Results   Component Value Date    CHOL 181 04/11/2023    TRIG 248 (H) 04/11/2023    HDL 50 04/11/2023    LDLCALC 81 04/11/2023    LDLDIRECT 107 (H) 04/21/2018     ALT   Date Value Ref Range Status   12/14/2023 13 10 - 40 U/L Final     AST   Date Value Ref Range Status   12/14/2023 14 (L) 15 - 37 U/L Final       The 10-year ASCVD risk score (Kirby LUNSFORD, et al., 2019) is: 46.1%    Values used to calculate the score:      Age: 73 years      Sex: Female      Is Non- : No      Diabetic: Yes      Tobacco smoker: No      Systolic Blood Pressure: 158 mmHg      Is BP treated: Yes      HDL Cholesterol: 50 mg/dL      Total Cholesterol: 181 mg/dL     Lab Results   Component Value Date    LABA1C 7.6 12/14/2023    LABA1C 7.4 04/11/2023

## 2024-04-16 LAB
25(OH)D3 SERPL-MCNC: 33.4 NG/ML
ALBUMIN SERPL-MCNC: 4.6 G/DL (ref 3.4–5)
ALBUMIN/GLOB SERPL: 2 {RATIO} (ref 1.1–2.2)
ALP SERPL-CCNC: 90 U/L (ref 40–129)
ALT SERPL-CCNC: 11 U/L (ref 10–40)
ANION GAP SERPL CALCULATED.3IONS-SCNC: 10 MMOL/L (ref 3–16)
AST SERPL-CCNC: 14 U/L (ref 15–37)
BILIRUB SERPL-MCNC: 0.3 MG/DL (ref 0–1)
BUN SERPL-MCNC: 12 MG/DL (ref 7–20)
CALCIUM SERPL-MCNC: 9.3 MG/DL (ref 8.3–10.6)
CHLORIDE SERPL-SCNC: 99 MMOL/L (ref 99–110)
CO2 SERPL-SCNC: 27 MMOL/L (ref 21–32)
CREAT SERPL-MCNC: 0.6 MG/DL (ref 0.6–1.2)
FOLATE SERPL-MCNC: 16.64 NG/ML (ref 4.78–24.2)
GFR SERPLBLD CREATININE-BSD FMLA CKD-EPI: >90 ML/MIN/{1.73_M2}
GLUCOSE SERPL-MCNC: 298 MG/DL (ref 70–99)
MAGNESIUM SERPL-MCNC: 1.9 MG/DL (ref 1.8–2.4)
POTASSIUM SERPL-SCNC: 4.4 MMOL/L (ref 3.5–5.1)
PROT SERPL-MCNC: 6.9 G/DL (ref 6.4–8.2)
SODIUM SERPL-SCNC: 136 MMOL/L (ref 136–145)
TSH SERPL DL<=0.005 MIU/L-ACNC: 0.86 UIU/ML (ref 0.27–4.2)
VIT B12 SERPL-MCNC: 168 PG/ML (ref 211–911)

## 2024-04-16 NOTE — ASSESSMENT & PLAN NOTE
Discussed SGLT2's and added benefit of renal protection and cardiac protection.  Patient is agreeable.  Start Jardiance 25 mg daily.  If cost is prohibitive we will see if she qualifies for patient assistance. Discussed risks and benefits of SGLT2's.  Discussed hygiene recommendations with SGLT2's.

## 2024-04-16 NOTE — ASSESSMENT & PLAN NOTE
Follows with Dr. Cuello -most recent appointment 8/1/2023.  Remains on aspirin, lisinopril 40 mg daily, Cardizem  mg twice daily, Crestor 20 mg daily. Recently saw Dr. Cuello for chest pain and underwent stress echo 3/4/24 with evidence of inferior and lateral scar.  Next appointment with Dr. Cuello is in August.

## 2024-04-16 NOTE — ASSESSMENT & PLAN NOTE
SSA and SSB were negative.  Likely worse currently because of hyperglycemia.  Discussed hydration.  Recommend trial of Liquid IV to see if that helps improve hydration.

## 2024-04-16 NOTE — TELEPHONE ENCOUNTER
Pt had PCP OV 4/16/24. Rosuvastatin marked as taking and appears plan to continue.    Routing to CSS to please follow up with pharmacy and patient.

## 2024-04-16 NOTE — TELEPHONE ENCOUNTER
Per oge, A 90ds of Rosuvastatin 20mg once daily will be refilled and ready for  later today at [facility]. 0.00 copay     Reached patient   Patient stated she already picked up a refill and have quite a few pills remaining.  Once she get home she will count how many pills remaining and will  a refill if she need it     For Pharmacy Admin Tracking Only    Program: [employer]  CPA in place:  No  Recommendation Provided To: Pharmacy: 1  Intervention Detail: Adherence Monitorin  Intervention Accepted By: Pharmacy: 1  Gap Closed?: No   Time Spent (min): 10

## 2024-04-16 NOTE — ASSESSMENT & PLAN NOTE
POCT hemoglobin A1c was 8.1%.  She is symptomatic for hyperglycemia.  Continue metformin ER 1500 mg daily and glipizide 2.5 mg twice daily with meals.  Discussed SGLT2's and added benefit of renal protection and cardiac protection.  Patient is agreeable.  Start Jardiance 25 mg daily.  If cost is prohibitive we will see if she qualifies for patient assistance.  Discussed risks and benefits of SGLT2's.  Discussed hygiene recommendations with SGLT2's.

## 2024-04-16 NOTE — ASSESSMENT & PLAN NOTE
Elevated today.  Patient states that her blood pressure is better controlled at home.  I have asked her to monitor her blood pressure and bring her readings with her to the next appointment.  Continue diltiazem  mg twice daily and lisinopril 20 mg twice daily.

## 2024-04-17 LAB — BACTERIA UR CULT: NORMAL

## 2024-05-10 ENCOUNTER — APPOINTMENT (OUTPATIENT)
Dept: CT IMAGING | Age: 74
DRG: 322 | End: 2024-05-10
Payer: MEDICARE

## 2024-05-10 ENCOUNTER — HOSPITAL ENCOUNTER (INPATIENT)
Age: 74
LOS: 3 days | Discharge: HOME | DRG: 322 | End: 2024-05-14
Attending: EMERGENCY MEDICINE | Admitting: INTERNAL MEDICINE
Payer: MEDICARE

## 2024-05-10 DIAGNOSIS — I25.83 CORONARY ARTERY DISEASE DUE TO LIPID RICH PLAQUE: Primary | ICD-10-CM

## 2024-05-10 DIAGNOSIS — I25.10 CORONARY ARTERY DISEASE DUE TO LIPID RICH PLAQUE: Primary | ICD-10-CM

## 2024-05-10 DIAGNOSIS — I21.4 NSTEMI (NON-ST ELEVATED MYOCARDIAL INFARCTION) (HCC): ICD-10-CM

## 2024-05-10 LAB
ALBUMIN SERPL-MCNC: 4.5 G/DL (ref 3.4–5)
ALBUMIN/GLOB SERPL: 1.4 {RATIO} (ref 1.1–2.2)
ALP SERPL-CCNC: 91 U/L (ref 40–129)
ALT SERPL-CCNC: 11 U/L (ref 10–40)
ANION GAP SERPL CALCULATED.3IONS-SCNC: 16 MMOL/L (ref 3–16)
AST SERPL-CCNC: 22 U/L (ref 15–37)
BACTERIA URNS QL MICRO: NORMAL /HPF
BASOPHILS # BLD: 0.2 K/UL (ref 0–0.2)
BASOPHILS NFR BLD: 2.3 %
BILIRUB SERPL-MCNC: 0.3 MG/DL (ref 0–1)
BILIRUB UR QL STRIP.AUTO: NEGATIVE
BUN SERPL-MCNC: 16 MG/DL (ref 7–20)
CALCIUM SERPL-MCNC: 10 MG/DL (ref 8.3–10.6)
CHLORIDE SERPL-SCNC: 98 MMOL/L (ref 99–110)
CLARITY UR: CLEAR
CO2 SERPL-SCNC: 22 MMOL/L (ref 21–32)
COLOR UR: YELLOW
CREAT SERPL-MCNC: 0.6 MG/DL (ref 0.6–1.2)
DEPRECATED RDW RBC AUTO: 12.6 % (ref 12.4–15.4)
EOSINOPHIL # BLD: 0.1 K/UL (ref 0–0.6)
EOSINOPHIL NFR BLD: 1.3 %
EPI CELLS #/AREA URNS AUTO: 0 /HPF (ref 0–5)
GFR SERPLBLD CREATININE-BSD FMLA CKD-EPI: >90 ML/MIN/{1.73_M2}
GLUCOSE SERPL-MCNC: 259 MG/DL (ref 70–99)
GLUCOSE UR STRIP.AUTO-MCNC: 500 MG/DL
HCT VFR BLD AUTO: 40.8 % (ref 36–48)
HGB BLD-MCNC: 13.7 G/DL (ref 12–16)
HGB UR QL STRIP.AUTO: ABNORMAL
HYALINE CASTS #/AREA URNS AUTO: 0 /LPF (ref 0–8)
KETONES UR STRIP.AUTO-MCNC: NEGATIVE MG/DL
LEUKOCYTE ESTERASE UR QL STRIP.AUTO: NEGATIVE
LIPASE SERPL-CCNC: 27 U/L (ref 13–60)
LYMPHOCYTES # BLD: 2 K/UL (ref 1–5.1)
LYMPHOCYTES NFR BLD: 25.5 %
MCH RBC QN AUTO: 30.5 PG (ref 26–34)
MCHC RBC AUTO-ENTMCNC: 33.5 G/DL (ref 31–36)
MCV RBC AUTO: 91.1 FL (ref 80–100)
MONOCYTES # BLD: 0.5 K/UL (ref 0–1.3)
MONOCYTES NFR BLD: 6.3 %
NEUTROPHILS # BLD: 5 K/UL (ref 1.7–7.7)
NEUTROPHILS NFR BLD: 64.6 %
NITRITE UR QL STRIP.AUTO: NEGATIVE
PH UR STRIP.AUTO: 5.5 [PH] (ref 5–8)
PLATELET # BLD AUTO: 222 K/UL (ref 135–450)
PMV BLD AUTO: 8.6 FL (ref 5–10.5)
POTASSIUM SERPL-SCNC: 4.1 MMOL/L (ref 3.5–5.1)
PROT SERPL-MCNC: 7.8 G/DL (ref 6.4–8.2)
PROT UR STRIP.AUTO-MCNC: NEGATIVE MG/DL
RBC # BLD AUTO: 4.48 M/UL (ref 4–5.2)
RBC CLUMPS #/AREA URNS AUTO: 4 /HPF (ref 0–4)
SODIUM SERPL-SCNC: 136 MMOL/L (ref 136–145)
SP GR UR STRIP.AUTO: 1.01 (ref 1–1.03)
TROPONIN, HIGH SENSITIVITY: 138 NG/L (ref 0–14)
UA COMPLETE W REFLEX CULTURE PNL UR: ABNORMAL
UA DIPSTICK W REFLEX MICRO PNL UR: YES
URN SPEC COLLECT METH UR: ABNORMAL
UROBILINOGEN UR STRIP-ACNC: 0.2 E.U./DL
WBC # BLD AUTO: 7.8 K/UL (ref 4–11)
WBC #/AREA URNS AUTO: 2 /HPF (ref 0–5)

## 2024-05-10 PROCEDURE — 84484 ASSAY OF TROPONIN QUANT: CPT

## 2024-05-10 PROCEDURE — 81001 URINALYSIS AUTO W/SCOPE: CPT

## 2024-05-10 PROCEDURE — 93005 ELECTROCARDIOGRAM TRACING: CPT | Performed by: EMERGENCY MEDICINE

## 2024-05-10 PROCEDURE — 85025 COMPLETE CBC W/AUTO DIFF WBC: CPT

## 2024-05-10 PROCEDURE — 74177 CT ABD & PELVIS W/CONTRAST: CPT

## 2024-05-10 PROCEDURE — 6360000004 HC RX CONTRAST MEDICATION: Performed by: EMERGENCY MEDICINE

## 2024-05-10 PROCEDURE — 83690 ASSAY OF LIPASE: CPT

## 2024-05-10 PROCEDURE — 99285 EMERGENCY DEPT VISIT HI MDM: CPT

## 2024-05-10 PROCEDURE — 71260 CT THORAX DX C+: CPT

## 2024-05-10 PROCEDURE — 80053 COMPREHEN METABOLIC PANEL: CPT

## 2024-05-10 RX ADMIN — IOPAMIDOL 75 ML: 755 INJECTION, SOLUTION INTRAVENOUS at 22:11

## 2024-05-10 ASSESSMENT — PAIN SCALES - GENERAL: PAINLEVEL_OUTOF10: 7

## 2024-05-10 ASSESSMENT — PAIN - FUNCTIONAL ASSESSMENT: PAIN_FUNCTIONAL_ASSESSMENT: 0-10

## 2024-05-11 PROBLEM — I21.4 NSTEMI (NON-ST ELEVATED MYOCARDIAL INFARCTION) (HCC): Status: ACTIVE | Noted: 2024-05-11

## 2024-05-11 LAB
ANTI-XA UNFRAC HEPARIN: 0.24 IU/ML (ref 0.3–0.7)
ANTI-XA UNFRAC HEPARIN: 0.34 IU/ML (ref 0.3–0.7)
ANTI-XA UNFRAC HEPARIN: 0.58 IU/ML (ref 0.3–0.7)
ANTI-XA UNFRAC HEPARIN: <0.1 IU/ML (ref 0.3–0.7)
CHOLEST SERPL-MCNC: 176 MG/DL (ref 0–199)
EKG ATRIAL RATE: 75 BPM
EKG ATRIAL RATE: 86 BPM
EKG DIAGNOSIS: NORMAL
EKG DIAGNOSIS: NORMAL
EKG P AXIS: 18 DEGREES
EKG P AXIS: 37 DEGREES
EKG P-R INTERVAL: 118 MS
EKG P-R INTERVAL: 128 MS
EKG Q-T INTERVAL: 366 MS
EKG Q-T INTERVAL: 372 MS
EKG QRS DURATION: 84 MS
EKG QRS DURATION: 86 MS
EKG QTC CALCULATION (BAZETT): 415 MS
EKG QTC CALCULATION (BAZETT): 437 MS
EKG R AXIS: -34 DEGREES
EKG R AXIS: -35 DEGREES
EKG T AXIS: -5 DEGREES
EKG T AXIS: -8 DEGREES
EKG VENTRICULAR RATE: 75 BPM
EKG VENTRICULAR RATE: 86 BPM
GLUCOSE BLD-MCNC: 190 MG/DL (ref 70–99)
GLUCOSE BLD-MCNC: 197 MG/DL (ref 70–99)
GLUCOSE BLD-MCNC: 210 MG/DL (ref 70–99)
HDLC SERPL-MCNC: 48 MG/DL (ref 40–60)
LDLC SERPL CALC-MCNC: ABNORMAL MG/DL
LDLC SERPL-MCNC: 98 MG/DL
PERFORMED ON: ABNORMAL
TRIGL SERPL-MCNC: 303 MG/DL (ref 0–150)
TROPONIN, HIGH SENSITIVITY: 160 NG/L (ref 0–14)
TROPONIN, HIGH SENSITIVITY: 167 NG/L (ref 0–14)
VLDLC SERPL CALC-MCNC: ABNORMAL MG/DL

## 2024-05-11 PROCEDURE — 84484 ASSAY OF TROPONIN QUANT: CPT

## 2024-05-11 PROCEDURE — 83036 HEMOGLOBIN GLYCOSYLATED A1C: CPT

## 2024-05-11 PROCEDURE — 93010 ELECTROCARDIOGRAM REPORT: CPT | Performed by: INTERNAL MEDICINE

## 2024-05-11 PROCEDURE — 93005 ELECTROCARDIOGRAM TRACING: CPT | Performed by: INTERNAL MEDICINE

## 2024-05-11 PROCEDURE — 2580000003 HC RX 258: Performed by: NURSE PRACTITIONER

## 2024-05-11 PROCEDURE — 2000000000 HC ICU R&B

## 2024-05-11 PROCEDURE — 6370000000 HC RX 637 (ALT 250 FOR IP): Performed by: NURSE PRACTITIONER

## 2024-05-11 PROCEDURE — 6360000002 HC RX W HCPCS: Performed by: NURSE PRACTITIONER

## 2024-05-11 PROCEDURE — 36415 COLL VENOUS BLD VENIPUNCTURE: CPT

## 2024-05-11 PROCEDURE — 80061 LIPID PANEL: CPT

## 2024-05-11 PROCEDURE — 85520 HEPARIN ASSAY: CPT

## 2024-05-11 PROCEDURE — 99222 1ST HOSP IP/OBS MODERATE 55: CPT | Performed by: STUDENT IN AN ORGANIZED HEALTH CARE EDUCATION/TRAINING PROGRAM

## 2024-05-11 PROCEDURE — 6360000002 HC RX W HCPCS: Performed by: EMERGENCY MEDICINE

## 2024-05-11 PROCEDURE — 6360000002 HC RX W HCPCS

## 2024-05-11 RX ORDER — MORPHINE SULFATE 2 MG/ML
INJECTION, SOLUTION INTRAMUSCULAR; INTRAVENOUS
Status: COMPLETED
Start: 2024-05-11 | End: 2024-05-11

## 2024-05-11 RX ORDER — INSULIN LISPRO 100 [IU]/ML
0-4 INJECTION, SOLUTION INTRAVENOUS; SUBCUTANEOUS NIGHTLY
Status: DISCONTINUED | OUTPATIENT
Start: 2024-05-11 | End: 2024-05-13

## 2024-05-11 RX ORDER — INSULIN LISPRO 100 [IU]/ML
0-4 INJECTION, SOLUTION INTRAVENOUS; SUBCUTANEOUS
Status: DISCONTINUED | OUTPATIENT
Start: 2024-05-11 | End: 2024-05-13

## 2024-05-11 RX ORDER — SODIUM CHLORIDE 0.9 % (FLUSH) 0.9 %
5-40 SYRINGE (ML) INJECTION EVERY 12 HOURS SCHEDULED
Status: DISCONTINUED | OUTPATIENT
Start: 2024-05-11 | End: 2024-05-14 | Stop reason: HOSPADM

## 2024-05-11 RX ORDER — HEPARIN SODIUM 1000 [USP'U]/ML
30 INJECTION, SOLUTION INTRAVENOUS; SUBCUTANEOUS PRN
Status: DISCONTINUED | OUTPATIENT
Start: 2024-05-11 | End: 2024-05-14 | Stop reason: HOSPADM

## 2024-05-11 RX ORDER — ACETAMINOPHEN 325 MG/1
650 TABLET ORAL EVERY 6 HOURS PRN
Status: DISCONTINUED | OUTPATIENT
Start: 2024-05-11 | End: 2024-05-13 | Stop reason: DRUGHIGH

## 2024-05-11 RX ORDER — LISINOPRIL 20 MG/1
20 TABLET ORAL 2 TIMES DAILY
Status: DISCONTINUED | OUTPATIENT
Start: 2024-05-11 | End: 2024-05-14 | Stop reason: HOSPADM

## 2024-05-11 RX ORDER — DILTIAZEM HYDROCHLORIDE 240 MG/1
240 CAPSULE, COATED, EXTENDED RELEASE ORAL 2 TIMES DAILY
Status: DISCONTINUED | OUTPATIENT
Start: 2024-05-11 | End: 2024-05-14 | Stop reason: HOSPADM

## 2024-05-11 RX ORDER — MAGNESIUM SULFATE IN WATER 40 MG/ML
2000 INJECTION, SOLUTION INTRAVENOUS PRN
Status: DISCONTINUED | OUTPATIENT
Start: 2024-05-11 | End: 2024-05-14 | Stop reason: HOSPADM

## 2024-05-11 RX ORDER — MORPHINE SULFATE 2 MG/ML
2 INJECTION, SOLUTION INTRAMUSCULAR; INTRAVENOUS ONCE AS NEEDED
Status: COMPLETED | OUTPATIENT
Start: 2024-05-11 | End: 2024-05-11

## 2024-05-11 RX ORDER — DEXTROSE MONOHYDRATE 100 MG/ML
INJECTION, SOLUTION INTRAVENOUS CONTINUOUS PRN
Status: DISCONTINUED | OUTPATIENT
Start: 2024-05-11 | End: 2024-05-14 | Stop reason: HOSPADM

## 2024-05-11 RX ORDER — POTASSIUM CHLORIDE 20 MEQ/1
40 TABLET, EXTENDED RELEASE ORAL PRN
Status: DISCONTINUED | OUTPATIENT
Start: 2024-05-11 | End: 2024-05-14 | Stop reason: HOSPADM

## 2024-05-11 RX ORDER — ACETAMINOPHEN 650 MG/1
650 SUPPOSITORY RECTAL EVERY 6 HOURS PRN
Status: DISCONTINUED | OUTPATIENT
Start: 2024-05-11 | End: 2024-05-14 | Stop reason: HOSPADM

## 2024-05-11 RX ORDER — GLUCAGON 1 MG/ML
1 KIT INJECTION PRN
Status: DISCONTINUED | OUTPATIENT
Start: 2024-05-11 | End: 2024-05-14 | Stop reason: HOSPADM

## 2024-05-11 RX ORDER — POLYETHYLENE GLYCOL 3350 17 G/17G
17 POWDER, FOR SOLUTION ORAL DAILY PRN
Status: DISCONTINUED | OUTPATIENT
Start: 2024-05-11 | End: 2024-05-14 | Stop reason: HOSPADM

## 2024-05-11 RX ORDER — ROSUVASTATIN CALCIUM 20 MG/1
20 TABLET, COATED ORAL DAILY
Status: DISCONTINUED | OUTPATIENT
Start: 2024-05-11 | End: 2024-05-14 | Stop reason: HOSPADM

## 2024-05-11 RX ORDER — HEPARIN SODIUM 10000 [USP'U]/100ML
5-30 INJECTION, SOLUTION INTRAVENOUS CONTINUOUS
Status: DISCONTINUED | OUTPATIENT
Start: 2024-05-11 | End: 2024-05-13

## 2024-05-11 RX ORDER — HEPARIN SODIUM 1000 [USP'U]/ML
60 INJECTION, SOLUTION INTRAVENOUS; SUBCUTANEOUS ONCE
Status: COMPLETED | OUTPATIENT
Start: 2024-05-11 | End: 2024-05-11

## 2024-05-11 RX ORDER — ASPIRIN 81 MG/1
81 TABLET ORAL DAILY
Status: DISCONTINUED | OUTPATIENT
Start: 2024-05-11 | End: 2024-05-14 | Stop reason: HOSPADM

## 2024-05-11 RX ORDER — SODIUM CHLORIDE 0.9 % (FLUSH) 0.9 %
5-40 SYRINGE (ML) INJECTION PRN
Status: DISCONTINUED | OUTPATIENT
Start: 2024-05-11 | End: 2024-05-14 | Stop reason: HOSPADM

## 2024-05-11 RX ORDER — ONDANSETRON 4 MG/1
4 TABLET, ORALLY DISINTEGRATING ORAL EVERY 8 HOURS PRN
Status: DISCONTINUED | OUTPATIENT
Start: 2024-05-11 | End: 2024-05-14 | Stop reason: HOSPADM

## 2024-05-11 RX ORDER — NITROGLYCERIN 20 MG/100ML
5-200 INJECTION INTRAVENOUS CONTINUOUS
Status: DISCONTINUED | OUTPATIENT
Start: 2024-05-11 | End: 2024-05-13

## 2024-05-11 RX ORDER — POTASSIUM CHLORIDE 7.45 MG/ML
10 INJECTION INTRAVENOUS PRN
Status: DISCONTINUED | OUTPATIENT
Start: 2024-05-11 | End: 2024-05-14 | Stop reason: HOSPADM

## 2024-05-11 RX ORDER — PANTOPRAZOLE SODIUM 40 MG/1
40 TABLET, DELAYED RELEASE ORAL
Status: DISCONTINUED | OUTPATIENT
Start: 2024-05-11 | End: 2024-05-11

## 2024-05-11 RX ORDER — HEPARIN SODIUM 1000 [USP'U]/ML
60 INJECTION, SOLUTION INTRAVENOUS; SUBCUTANEOUS PRN
Status: DISCONTINUED | OUTPATIENT
Start: 2024-05-11 | End: 2024-05-14 | Stop reason: HOSPADM

## 2024-05-11 RX ORDER — SODIUM CHLORIDE 9 MG/ML
INJECTION, SOLUTION INTRAVENOUS PRN
Status: DISCONTINUED | OUTPATIENT
Start: 2024-05-11 | End: 2024-05-14 | Stop reason: HOSPADM

## 2024-05-11 RX ORDER — HYDRALAZINE HYDROCHLORIDE 20 MG/ML
10 INJECTION INTRAMUSCULAR; INTRAVENOUS EVERY 6 HOURS PRN
Status: DISCONTINUED | OUTPATIENT
Start: 2024-05-11 | End: 2024-05-14 | Stop reason: HOSPADM

## 2024-05-11 RX ORDER — ONDANSETRON 2 MG/ML
4 INJECTION INTRAMUSCULAR; INTRAVENOUS EVERY 6 HOURS PRN
Status: DISCONTINUED | OUTPATIENT
Start: 2024-05-11 | End: 2024-05-14 | Stop reason: HOSPADM

## 2024-05-11 RX ADMIN — Medication 10 ML: at 21:01

## 2024-05-11 RX ADMIN — INSULIN LISPRO 1 UNITS: 100 INJECTION, SOLUTION INTRAVENOUS; SUBCUTANEOUS at 11:43

## 2024-05-11 RX ADMIN — LISINOPRIL 20 MG: 20 TABLET ORAL at 09:16

## 2024-05-11 RX ADMIN — DILTIAZEM HYDROCHLORIDE 240 MG: 240 CAPSULE, EXTENDED RELEASE ORAL at 21:00

## 2024-05-11 RX ADMIN — HEPARIN SODIUM 3800 UNITS: 1000 INJECTION INTRAVENOUS; SUBCUTANEOUS at 00:58

## 2024-05-11 RX ADMIN — NITROGLYCERIN 0.5 INCH: 20 OINTMENT TOPICAL at 02:42

## 2024-05-11 RX ADMIN — MORPHINE SULFATE 2 MG: 2 INJECTION, SOLUTION INTRAMUSCULAR; INTRAVENOUS at 02:56

## 2024-05-11 RX ADMIN — DILTIAZEM HYDROCHLORIDE 240 MG: 240 CAPSULE, EXTENDED RELEASE ORAL at 09:15

## 2024-05-11 RX ADMIN — HEPARIN SODIUM 12 UNITS/KG/HR: 10000 INJECTION, SOLUTION INTRAVENOUS at 01:02

## 2024-05-11 RX ADMIN — ASPIRIN 81 MG: 81 TABLET, COATED ORAL at 09:16

## 2024-05-11 RX ADMIN — NITROGLYCERIN 10 MCG/MIN: 20 INJECTION INTRAVENOUS at 03:42

## 2024-05-11 RX ADMIN — LISINOPRIL 20 MG: 20 TABLET ORAL at 21:00

## 2024-05-11 RX ADMIN — ROSUVASTATIN 20 MG: 20 TABLET, FILM COATED ORAL at 09:16

## 2024-05-11 ASSESSMENT — PAIN DESCRIPTION - ORIENTATION
ORIENTATION: LEFT
ORIENTATION: UPPER
ORIENTATION: UPPER;LEFT

## 2024-05-11 ASSESSMENT — PAIN DESCRIPTION - DESCRIPTORS
DESCRIPTORS: ACHING;DISCOMFORT
DESCRIPTORS: BURNING

## 2024-05-11 ASSESSMENT — HEART SCORE: ECG: NON-SPECIFC REPOLARIZATION DISTURBANCE/LBTB/PM

## 2024-05-11 ASSESSMENT — PAIN SCALES - GENERAL
PAINLEVEL_OUTOF10: 0
PAINLEVEL_OUTOF10: 4
PAINLEVEL_OUTOF10: 8
PAINLEVEL_OUTOF10: 0
PAINLEVEL_OUTOF10: 6
PAINLEVEL_OUTOF10: 0
PAINLEVEL_OUTOF10: 0

## 2024-05-11 ASSESSMENT — PAIN DESCRIPTION - LOCATION
LOCATION: BACK
LOCATION: ABDOMEN;BACK
LOCATION: ABDOMEN

## 2024-05-11 NOTE — H&P
V2.0  History and Physical      Name:  Linn Aguayo /Age/Sex: 1950  (73 y.o. female)   MRN & CSN:  [id number] & [id number] Encounter Date/Time: 2024 1:37 AM EDT   Location:  ED- PCP: Lyssa Barr DO       Hospital Day: 2    Assessment and Plan:   Linn Aguayo is a 73 y.o. female  who presents with NSTEMI (non-ST elevated myocardial infarction) (HCC)    Hospital Problems             Last Modified POA    * (Principal) NSTEMI (non-ST elevated myocardial infarction) (HCC) 2024 Yes       Plan:  NSTEMI  Admit inpatient with telemetry  ASA  Heparin gtt  Start nitro patch  Continue statin and ACE  NPO  Cardiology consult in am     2. Hypertension  BP elevated continue home meds with addition of prn hydralazine  Goal SBP <160    3. Hyperlipidemia  Continue statin    4. Type II DM  Hold oral agents, sliding scale insulin      Disposition:   Current Living situation: home  Expected Disposition: home  Estimated D/C: 2    Diet No diet orders on file   DVT Prophylaxis [] Lovenox, [x]  Heparin, [] SCDs, [] Ambulation,  [] Eliquis, [] Xarelto, [] Coumadin   Code Status Prior   Surrogate Decision Maker/ POA      Personally reviewed Lab Studies and Imaging     Discussed management of the case with Corey Reyes MD in ER who recommended admission     EKG and results NSR HR 86, left axis deviation,           History from:     patient, family member - daughter    History of Present Illness:     Chief Complaint: epigastric pain   Linn Aguayo is a 73 y.o. female with pmh of HTN, HLD, CAD with posterior MI 2018  who presents with epigastric pain.  Patient has been experiencing epigastric pain for the last 3 days.  Patient states the pain comes and goes and more of a pressure sensation it did start radiating up into her chest.  Yesterday the pain was present throughout the day so she came to the emergency room.  She denies any shortness of breath, nausea or vomiting.  Symptoms

## 2024-05-11 NOTE — ED NOTES
Report called to Shelia GARCIA. All questions and concerns answered at this time.    VM left to call office to discuss surgery folder and additional questions she had

## 2024-05-11 NOTE — FLOWSHEET NOTE
05/11/24 0235   Vitals   BP (!) 182/73   MAP (Calculated) 109   BP Location Left upper arm   BP Upper/Lower Upper   BP Method Automatic   Pain Assessment   Pain Assessment 0-10   Pain Level 6   Pain Location Abdomen;Back   Pain Orientation Upper;Left   Pain Descriptors Aching;Discomfort   Oxygen Therapy   Pulse Oximetry Type Intermittent   O2 Device Nasal cannula   O2 Flow Rate (L/min) 2 L/min     Pt admitted from ED. After arriving to floor pt went to bathroom stand by assist then assisted to bed. After getting in bed pt described epigastric pain that radiated to left shoulder pain 6/10. Pt describes this pain as similar pain to previous pain she had when she had her heart attack. Stat EKG ordered. Nitro applied. Coretta MIRAMONTES notified and at bedside.

## 2024-05-11 NOTE — CONSULTS
[facility]    5/10/2024    Linn Aguayo (:  1950) is a 73 y.o. female    Referring Provider: Lyssa Barr DO    HISTORY:  73F hx CAD s/p posterior MI 2018 with PCI to OM1, HTN, HLD, DM admitted for chest pain, found to have elevated troponin consistent with NSTEMI. She had some similar epigastric sx in last few months, saw Dr. Cuello and had stress echo with fixed defect but no obvious ischemia. Over past few days symptoms more persistent. Describes central pressure radiating into chest, mostly with exertion but on and off throughout the day yesterday before admission. Labs in ER reveal elevated troponin otherwise nothing to note. She was in ICU on heparin and nitro drips. Doing okay at rest now w nitro only @5. Based on these findings I recommend left heart cath for definitive evaluation of coronary arteries.  Risks, benefits, expectations, and alternative treatments were discussed.  Questions appropriately answered.  Linn Aguayo agrees to proceed and verbalized understanding.      No new questions or concerns. No chest pain while eating lunch at this time.     REVIEW OF SYSTEMS:  A complete review of systems was reviewed and is negative except as noted in the history of present illness.    Prior to Visit Medications    Medication Sig Taking? Authorizing Provider   empagliflozin (JARDIANCE) 25 MG tablet Take 1 tablet by mouth daily  Patient not taking: Reported on 2024  Lyssa Barr DO   empagliflozin (JARDIANCE) 25 MG tablet Take 1 tablet by mouth daily  Patient not taking: Reported on 2024  Lyssa Barr DO   pantoprazole (PROTONIX) 40 MG tablet Take 1 tablet by mouth every morning (before breakfast)  Patient not taking: Reported on 2024  Lyssa Barr DO   ACCU-CHEK GUIDE strip TEST BLOOD SUGAR ONCE DAILY AND AS NEEDED FOR SYMPTOMS OF IRREGULAR BLOOD GLUCOSE  Lyssa Barr DO   rosuvastatin (CRESTOR) 20 MG tablet Take 1 tablet by

## 2024-05-11 NOTE — ED PROVIDER NOTES
Eosinophils % 1.3 %    Basophils % 2.3 %    Neutrophils Absolute 5.0 1.7 - 7.7 K/uL    Lymphocytes Absolute 2.0 1.0 - 5.1 K/uL    Monocytes Absolute 0.5 0.0 - 1.3 K/uL    Eosinophils Absolute 0.1 0.0 - 0.6 K/uL    Basophils Absolute 0.2 0.0 - 0.2 K/uL   Comprehensive Metabolic Panel w/ Reflex to MG   Result Value Ref Range    Sodium 136 136 - 145 mmol/L    Potassium reflex Magnesium 4.1 3.5 - 5.1 mmol/L    Chloride 98 (L) 99 - 110 mmol/L    CO2 22 21 - 32 mmol/L    Anion Gap 16 3 - 16    Glucose 259 (H) 70 - 99 mg/dL    BUN 16 7 - 20 mg/dL    Creatinine 0.6 0.6 - 1.2 mg/dL    Est, Glom Filt Rate >90 >60    Calcium 10.0 8.3 - 10.6 mg/dL    Total Protein 7.8 6.4 - 8.2 g/dL    Albumin 4.5 3.4 - 5.0 g/dL    Albumin/Globulin Ratio 1.4 1.1 - 2.2    Total Bilirubin 0.3 0.0 - 1.0 mg/dL    Alkaline Phosphatase 91 40 - 129 U/L    ALT 11 10 - 40 U/L    AST 22 15 - 37 U/L   Lipase   Result Value Ref Range    Lipase 27.0 13.0 - 60.0 U/L   Urinalysis with Reflex to Culture    Specimen: Urine, clean catch   Result Value Ref Range    Color, UA Yellow Straw/Yellow    Clarity, UA Clear Clear    Glucose, Ur 500 (A) Negative mg/dL    Bilirubin, Urine Negative Negative    Ketones, Urine Negative Negative mg/dL    Specific Gravity, UA 1.010 1.005 - 1.030    Blood, Urine MODERATE (A) Negative    pH, Urine 5.5 5.0 - 8.0    Protein, UA Negative Negative mg/dL    Urobilinogen, Urine 0.2 <2.0 E.U./dL    Nitrite, Urine Negative Negative    Leukocyte Esterase, Urine Negative Negative    Microscopic Examination YES     Urine Type Voided     Urine Reflex to Culture Not Indicated    Troponin   Result Value Ref Range    Troponin, High Sensitivity 138 (H) 0 - 14 ng/L   Microscopic Urinalysis   Result Value Ref Range    Bacteria, UA None Seen None Seen /HPF    Hyaline Casts, UA 0 0 - 8 /LPF    WBC, UA 2 0 - 5 /HPF    RBC, UA 4 0 - 4 /HPF    Epithelial Cells, UA 0 0 - 5 /HPF     Screenings  Heart Score for chest pain patients  History: Slightly

## 2024-05-12 LAB
ALBUMIN SERPL-MCNC: 4.1 G/DL (ref 3.4–5)
ALBUMIN/GLOB SERPL: 1.4 {RATIO} (ref 1.1–2.2)
ALP SERPL-CCNC: 82 U/L (ref 40–129)
ALT SERPL-CCNC: 14 U/L (ref 10–40)
ANION GAP SERPL CALCULATED.3IONS-SCNC: 14 MMOL/L (ref 3–16)
ANTI-XA UNFRAC HEPARIN: 0.43 IU/ML (ref 0.3–0.7)
AST SERPL-CCNC: 22 U/L (ref 15–37)
BASOPHILS # BLD: 0 K/UL (ref 0–0.2)
BASOPHILS NFR BLD: 0.4 %
BILIRUB SERPL-MCNC: 0.3 MG/DL (ref 0–1)
BUN SERPL-MCNC: 18 MG/DL (ref 7–20)
CALCIUM SERPL-MCNC: 9.2 MG/DL (ref 8.3–10.6)
CHLORIDE SERPL-SCNC: 101 MMOL/L (ref 99–110)
CO2 SERPL-SCNC: 22 MMOL/L (ref 21–32)
CREAT SERPL-MCNC: 0.7 MG/DL (ref 0.6–1.2)
DEPRECATED RDW RBC AUTO: 12.5 % (ref 12.4–15.4)
EOSINOPHIL # BLD: 0.1 K/UL (ref 0–0.6)
EOSINOPHIL NFR BLD: 2.5 %
EST. AVERAGE GLUCOSE BLD GHB EST-MCNC: 177.2 MG/DL
GFR SERPLBLD CREATININE-BSD FMLA CKD-EPI: >90 ML/MIN/{1.73_M2}
GLUCOSE BLD-MCNC: 181 MG/DL (ref 70–99)
GLUCOSE BLD-MCNC: 197 MG/DL (ref 70–99)
GLUCOSE BLD-MCNC: 199 MG/DL (ref 70–99)
GLUCOSE BLD-MCNC: 201 MG/DL (ref 70–99)
GLUCOSE SERPL-MCNC: 240 MG/DL (ref 70–99)
HBA1C MFR BLD: 7.8 %
HCT VFR BLD AUTO: 37.3 % (ref 36–48)
HGB BLD-MCNC: 12.5 G/DL (ref 12–16)
LYMPHOCYTES # BLD: 1.9 K/UL (ref 1–5.1)
LYMPHOCYTES NFR BLD: 32.8 %
MCH RBC QN AUTO: 30.5 PG (ref 26–34)
MCHC RBC AUTO-ENTMCNC: 33.4 G/DL (ref 31–36)
MCV RBC AUTO: 91.2 FL (ref 80–100)
MONOCYTES # BLD: 0.5 K/UL (ref 0–1.3)
MONOCYTES NFR BLD: 8.8 %
NEUTROPHILS # BLD: 3.3 K/UL (ref 1.7–7.7)
NEUTROPHILS NFR BLD: 55.5 %
PERFORMED ON: ABNORMAL
PLATELET # BLD AUTO: 198 K/UL (ref 135–450)
PMV BLD AUTO: 8.7 FL (ref 5–10.5)
POTASSIUM SERPL-SCNC: 4.2 MMOL/L (ref 3.5–5.1)
PROT SERPL-MCNC: 7 G/DL (ref 6.4–8.2)
RBC # BLD AUTO: 4.09 M/UL (ref 4–5.2)
SODIUM SERPL-SCNC: 137 MMOL/L (ref 136–145)
WBC # BLD AUTO: 5.9 K/UL (ref 4–11)

## 2024-05-12 PROCEDURE — 99233 SBSQ HOSP IP/OBS HIGH 50: CPT | Performed by: INTERNAL MEDICINE

## 2024-05-12 PROCEDURE — 85520 HEPARIN ASSAY: CPT

## 2024-05-12 PROCEDURE — 6360000002 HC RX W HCPCS: Performed by: NURSE PRACTITIONER

## 2024-05-12 PROCEDURE — 6370000000 HC RX 637 (ALT 250 FOR IP): Performed by: INTERNAL MEDICINE

## 2024-05-12 PROCEDURE — 85025 COMPLETE CBC W/AUTO DIFF WBC: CPT

## 2024-05-12 PROCEDURE — 6370000000 HC RX 637 (ALT 250 FOR IP): Performed by: NURSE PRACTITIONER

## 2024-05-12 PROCEDURE — 2580000003 HC RX 258: Performed by: NURSE PRACTITIONER

## 2024-05-12 PROCEDURE — 80053 COMPREHEN METABOLIC PANEL: CPT

## 2024-05-12 PROCEDURE — 2000000000 HC ICU R&B

## 2024-05-12 RX ORDER — INSULIN GLARGINE 100 [IU]/ML
5 INJECTION, SOLUTION SUBCUTANEOUS DAILY
Status: DISCONTINUED | OUTPATIENT
Start: 2024-05-13 | End: 2024-05-14 | Stop reason: HOSPADM

## 2024-05-12 RX ORDER — INSULIN GLARGINE 100 [IU]/ML
INJECTION, SOLUTION SUBCUTANEOUS
Status: COMPLETED
Start: 2024-05-12 | End: 2024-05-12

## 2024-05-12 RX ORDER — INSULIN GLARGINE 100 [IU]/ML
10 INJECTION, SOLUTION SUBCUTANEOUS DAILY
Status: DISCONTINUED | OUTPATIENT
Start: 2024-05-12 | End: 2024-05-12

## 2024-05-12 RX ADMIN — HEPARIN SODIUM 18 UNITS/KG/HR: 10000 INJECTION, SOLUTION INTRAVENOUS at 02:45

## 2024-05-12 RX ADMIN — INSULIN LISPRO 1 UNITS: 100 INJECTION, SOLUTION INTRAVENOUS; SUBCUTANEOUS at 09:06

## 2024-05-12 RX ADMIN — Medication 10 ML: at 21:39

## 2024-05-12 RX ADMIN — ONDANSETRON 4 MG: 2 INJECTION INTRAMUSCULAR; INTRAVENOUS at 14:55

## 2024-05-12 RX ADMIN — ROSUVASTATIN 20 MG: 20 TABLET, FILM COATED ORAL at 09:04

## 2024-05-12 RX ADMIN — ASPIRIN 81 MG: 81 TABLET, COATED ORAL at 09:04

## 2024-05-12 RX ADMIN — DILTIAZEM HYDROCHLORIDE 240 MG: 240 CAPSULE, EXTENDED RELEASE ORAL at 09:04

## 2024-05-12 RX ADMIN — HEPARIN SODIUM 18 UNITS/KG/HR: 10000 INJECTION, SOLUTION INTRAVENOUS at 23:41

## 2024-05-12 RX ADMIN — INSULIN LISPRO 1 UNITS: 100 INJECTION, SOLUTION INTRAVENOUS; SUBCUTANEOUS at 17:28

## 2024-05-12 RX ADMIN — LISINOPRIL 20 MG: 20 TABLET ORAL at 09:04

## 2024-05-12 RX ADMIN — INSULIN GLARGINE 10 UNITS: 100 INJECTION, SOLUTION SUBCUTANEOUS at 09:04

## 2024-05-12 RX ADMIN — LISINOPRIL 20 MG: 20 TABLET ORAL at 21:36

## 2024-05-12 RX ADMIN — DILTIAZEM HYDROCHLORIDE 240 MG: 240 CAPSULE, EXTENDED RELEASE ORAL at 21:36

## 2024-05-12 ASSESSMENT — PAIN - FUNCTIONAL ASSESSMENT: PAIN_FUNCTIONAL_ASSESSMENT: ACTIVITIES ARE NOT PREVENTED

## 2024-05-12 ASSESSMENT — PAIN SCALES - GENERAL
PAINLEVEL_OUTOF10: 4
PAINLEVEL_OUTOF10: 0
PAINLEVEL_OUTOF10: 1
PAINLEVEL_OUTOF10: 0

## 2024-05-12 ASSESSMENT — PAIN DESCRIPTION - ORIENTATION: ORIENTATION: RIGHT

## 2024-05-12 ASSESSMENT — PAIN DESCRIPTION - DESCRIPTORS: DESCRIPTORS: ACHING;SHOOTING

## 2024-05-12 ASSESSMENT — PAIN DESCRIPTION - LOCATION: LOCATION: SHOULDER;BACK

## 2024-05-12 ASSESSMENT — PAIN DESCRIPTION - PAIN TYPE: TYPE: ACUTE PAIN

## 2024-05-12 NOTE — PLAN OF CARE
Problem: Discharge Planning  Goal: Discharge to home or other facility with appropriate resources  Outcome: Progressing     Problem: Pain  Goal: Verbalizes/displays adequate comfort level or baseline comfort level  Outcome: Progressing  Flowsheets (Taken 5/11/2024 1200 by Darlene Gurrola RN)  Verbalizes/displays adequate comfort level or baseline comfort level: Encourage patient to monitor pain and request assistance     Problem: Safety - Adult  Goal: Free from fall injury  Outcome: Progressing

## 2024-05-13 DIAGNOSIS — I25.83 CORONARY ARTERY DISEASE DUE TO LIPID RICH PLAQUE: Primary | ICD-10-CM

## 2024-05-13 DIAGNOSIS — I25.10 CORONARY ARTERY DISEASE DUE TO LIPID RICH PLAQUE: Primary | ICD-10-CM

## 2024-05-13 LAB
ALBUMIN SERPL-MCNC: 4.3 G/DL (ref 3.4–5)
ANION GAP SERPL CALCULATED.3IONS-SCNC: 13 MMOL/L (ref 3–16)
ANTI-XA UNFRAC HEPARIN: 0.53 IU/ML (ref 0.3–0.7)
BUN SERPL-MCNC: 15 MG/DL (ref 7–20)
CALCIUM SERPL-MCNC: 9.3 MG/DL (ref 8.3–10.6)
CHLORIDE SERPL-SCNC: 100 MMOL/L (ref 99–110)
CO2 SERPL-SCNC: 23 MMOL/L (ref 21–32)
CREAT SERPL-MCNC: <0.5 MG/DL (ref 0.6–1.2)
DEPRECATED RDW RBC AUTO: 12.5 % (ref 12.4–15.4)
GFR SERPLBLD CREATININE-BSD FMLA CKD-EPI: >90 ML/MIN/{1.73_M2}
GLUCOSE BLD-MCNC: 162 MG/DL (ref 70–99)
GLUCOSE BLD-MCNC: 209 MG/DL (ref 70–99)
GLUCOSE BLD-MCNC: 250 MG/DL (ref 70–99)
GLUCOSE SERPL-MCNC: 205 MG/DL (ref 70–99)
HCT VFR BLD AUTO: 38.6 % (ref 36–48)
HGB BLD-MCNC: 13.3 G/DL (ref 12–16)
MCH RBC QN AUTO: 31.4 PG (ref 26–34)
MCHC RBC AUTO-ENTMCNC: 34.6 G/DL (ref 31–36)
MCV RBC AUTO: 90.9 FL (ref 80–100)
PERFORMED ON: ABNORMAL
PHOSPHATE SERPL-MCNC: 4.5 MG/DL (ref 2.5–4.9)
PLATELET # BLD AUTO: 201 K/UL (ref 135–450)
PMV BLD AUTO: 8.2 FL (ref 5–10.5)
POC ACT LR: 151 SEC
POC ACT LR: 224 SEC
POC ACT LR: 373 SEC
POTASSIUM SERPL-SCNC: 4.3 MMOL/L (ref 3.5–5.1)
RBC # BLD AUTO: 4.25 M/UL (ref 4–5.2)
SODIUM SERPL-SCNC: 136 MMOL/L (ref 136–145)
WBC # BLD AUTO: 5.7 K/UL (ref 4–11)

## 2024-05-13 PROCEDURE — 027034Z DILATION OF CORONARY ARTERY, ONE ARTERY WITH DRUG-ELUTING INTRALUMINAL DEVICE, PERCUTANEOUS APPROACH: ICD-10-PCS | Performed by: STUDENT IN AN ORGANIZED HEALTH CARE EDUCATION/TRAINING PROGRAM

## 2024-05-13 PROCEDURE — 2580000003 HC RX 258

## 2024-05-13 PROCEDURE — 85027 COMPLETE CBC AUTOMATED: CPT

## 2024-05-13 PROCEDURE — 2500000003 HC RX 250 WO HCPCS: Performed by: STUDENT IN AN ORGANIZED HEALTH CARE EDUCATION/TRAINING PROGRAM

## 2024-05-13 PROCEDURE — 2500000003 HC RX 250 WO HCPCS

## 2024-05-13 PROCEDURE — C1874 STENT, COATED/COV W/DEL SYS: HCPCS | Performed by: STUDENT IN AN ORGANIZED HEALTH CARE EDUCATION/TRAINING PROGRAM

## 2024-05-13 PROCEDURE — 93458 L HRT ARTERY/VENTRICLE ANGIO: CPT | Performed by: STUDENT IN AN ORGANIZED HEALTH CARE EDUCATION/TRAINING PROGRAM

## 2024-05-13 PROCEDURE — 6360000004 HC RX CONTRAST MEDICATION: Performed by: STUDENT IN AN ORGANIZED HEALTH CARE EDUCATION/TRAINING PROGRAM

## 2024-05-13 PROCEDURE — 6370000000 HC RX 637 (ALT 250 FOR IP): Performed by: INTERNAL MEDICINE

## 2024-05-13 PROCEDURE — 2709999900 HC NON-CHARGEABLE SUPPLY: Performed by: STUDENT IN AN ORGANIZED HEALTH CARE EDUCATION/TRAINING PROGRAM

## 2024-05-13 PROCEDURE — C1769 GUIDE WIRE: HCPCS | Performed by: STUDENT IN AN ORGANIZED HEALTH CARE EDUCATION/TRAINING PROGRAM

## 2024-05-13 PROCEDURE — 6370000000 HC RX 637 (ALT 250 FOR IP): Performed by: STUDENT IN AN ORGANIZED HEALTH CARE EDUCATION/TRAINING PROGRAM

## 2024-05-13 PROCEDURE — 4A023N7 MEASUREMENT OF CARDIAC SAMPLING AND PRESSURE, LEFT HEART, PERCUTANEOUS APPROACH: ICD-10-PCS | Performed by: STUDENT IN AN ORGANIZED HEALTH CARE EDUCATION/TRAINING PROGRAM

## 2024-05-13 PROCEDURE — 6360000002 HC RX W HCPCS

## 2024-05-13 PROCEDURE — C1887 CATHETER, GUIDING: HCPCS | Performed by: STUDENT IN AN ORGANIZED HEALTH CARE EDUCATION/TRAINING PROGRAM

## 2024-05-13 PROCEDURE — 2580000003 HC RX 258: Performed by: NURSE PRACTITIONER

## 2024-05-13 PROCEDURE — 99152 MOD SED SAME PHYS/QHP 5/>YRS: CPT | Performed by: STUDENT IN AN ORGANIZED HEALTH CARE EDUCATION/TRAINING PROGRAM

## 2024-05-13 PROCEDURE — B2111ZZ FLUOROSCOPY OF MULTIPLE CORONARY ARTERIES USING LOW OSMOLAR CONTRAST: ICD-10-PCS | Performed by: STUDENT IN AN ORGANIZED HEALTH CARE EDUCATION/TRAINING PROGRAM

## 2024-05-13 PROCEDURE — 6370000000 HC RX 637 (ALT 250 FOR IP): Performed by: NURSE PRACTITIONER

## 2024-05-13 PROCEDURE — 6360000002 HC RX W HCPCS: Performed by: NURSE PRACTITIONER

## 2024-05-13 PROCEDURE — 85520 HEPARIN ASSAY: CPT

## 2024-05-13 PROCEDURE — 85347 COAGULATION TIME ACTIVATED: CPT

## 2024-05-13 PROCEDURE — 6360000002 HC RX W HCPCS: Performed by: STUDENT IN AN ORGANIZED HEALTH CARE EDUCATION/TRAINING PROGRAM

## 2024-05-13 PROCEDURE — 2000000000 HC ICU R&B

## 2024-05-13 PROCEDURE — C1894 INTRO/SHEATH, NON-LASER: HCPCS | Performed by: STUDENT IN AN ORGANIZED HEALTH CARE EDUCATION/TRAINING PROGRAM

## 2024-05-13 PROCEDURE — 80069 RENAL FUNCTION PANEL: CPT

## 2024-05-13 PROCEDURE — 6370000000 HC RX 637 (ALT 250 FOR IP)

## 2024-05-13 PROCEDURE — 92928 PRQ TCAT PLMT NTRAC ST 1 LES: CPT | Performed by: STUDENT IN AN ORGANIZED HEALTH CARE EDUCATION/TRAINING PROGRAM

## 2024-05-13 PROCEDURE — C9600 PERC DRUG-EL COR STENT SING: HCPCS | Performed by: STUDENT IN AN ORGANIZED HEALTH CARE EDUCATION/TRAINING PROGRAM

## 2024-05-13 PROCEDURE — 99232 SBSQ HOSP IP/OBS MODERATE 35: CPT | Performed by: STUDENT IN AN ORGANIZED HEALTH CARE EDUCATION/TRAINING PROGRAM

## 2024-05-13 PROCEDURE — C1725 CATH, TRANSLUMIN NON-LASER: HCPCS | Performed by: STUDENT IN AN ORGANIZED HEALTH CARE EDUCATION/TRAINING PROGRAM

## 2024-05-13 PROCEDURE — 2580000003 HC RX 258: Performed by: STUDENT IN AN ORGANIZED HEALTH CARE EDUCATION/TRAINING PROGRAM

## 2024-05-13 PROCEDURE — 99153 MOD SED SAME PHYS/QHP EA: CPT | Performed by: STUDENT IN AN ORGANIZED HEALTH CARE EDUCATION/TRAINING PROGRAM

## 2024-05-13 DEVICE — EVEROLIMUS-ELUTING PLATINUM CHROMIUM CORONARY STENT SYSTEM
Type: IMPLANTABLE DEVICE | Status: FUNCTIONAL
Brand: SYNERGY™ XD

## 2024-05-13 RX ORDER — MIDAZOLAM HYDROCHLORIDE 1 MG/ML
INJECTION INTRAMUSCULAR; INTRAVENOUS PRN
Status: DISCONTINUED | OUTPATIENT
Start: 2024-05-13 | End: 2024-05-13 | Stop reason: HOSPADM

## 2024-05-13 RX ORDER — ROSUVASTATIN CALCIUM 20 MG/1
40 TABLET, COATED ORAL DAILY
Qty: 90 TABLET | Refills: 3 | Status: SHIPPED | OUTPATIENT
Start: 2024-05-13

## 2024-05-13 RX ORDER — SODIUM CHLORIDE 9 MG/ML
INJECTION, SOLUTION INTRAVENOUS PRN
Status: DISCONTINUED | OUTPATIENT
Start: 2024-05-13 | End: 2024-05-14 | Stop reason: HOSPADM

## 2024-05-13 RX ORDER — ONDANSETRON 2 MG/ML
INJECTION INTRAMUSCULAR; INTRAVENOUS PRN
Status: DISCONTINUED | OUTPATIENT
Start: 2024-05-13 | End: 2024-05-13 | Stop reason: HOSPADM

## 2024-05-13 RX ORDER — INSULIN LISPRO 100 [IU]/ML
0-4 INJECTION, SOLUTION INTRAVENOUS; SUBCUTANEOUS NIGHTLY
Status: DISCONTINUED | OUTPATIENT
Start: 2024-05-13 | End: 2024-05-14 | Stop reason: HOSPADM

## 2024-05-13 RX ORDER — SODIUM CHLORIDE 0.9 % (FLUSH) 0.9 %
5-40 SYRINGE (ML) INJECTION PRN
Status: DISCONTINUED | OUTPATIENT
Start: 2024-05-13 | End: 2024-05-14 | Stop reason: HOSPADM

## 2024-05-13 RX ORDER — ACETAMINOPHEN 325 MG/1
650 TABLET ORAL EVERY 4 HOURS PRN
Status: DISCONTINUED | OUTPATIENT
Start: 2024-05-13 | End: 2024-05-14 | Stop reason: HOSPADM

## 2024-05-13 RX ORDER — LIDOCAINE HYDROCHLORIDE 10 MG/ML
INJECTION, SOLUTION INFILTRATION; PERINEURAL PRN
Status: DISCONTINUED | OUTPATIENT
Start: 2024-05-13 | End: 2024-05-13 | Stop reason: HOSPADM

## 2024-05-13 RX ORDER — SODIUM CHLORIDE 0.9 % (FLUSH) 0.9 %
5-40 SYRINGE (ML) INJECTION EVERY 12 HOURS SCHEDULED
Status: DISCONTINUED | OUTPATIENT
Start: 2024-05-13 | End: 2024-05-14 | Stop reason: HOSPADM

## 2024-05-13 RX ORDER — HEPARIN SODIUM 1000 [USP'U]/ML
INJECTION, SOLUTION INTRAVENOUS; SUBCUTANEOUS PRN
Status: DISCONTINUED | OUTPATIENT
Start: 2024-05-13 | End: 2024-05-13 | Stop reason: HOSPADM

## 2024-05-13 RX ORDER — FENTANYL CITRATE 50 UG/ML
INJECTION, SOLUTION INTRAMUSCULAR; INTRAVENOUS PRN
Status: DISCONTINUED | OUTPATIENT
Start: 2024-05-13 | End: 2024-05-13 | Stop reason: HOSPADM

## 2024-05-13 RX ORDER — SODIUM CHLORIDE 9 MG/ML
INJECTION, SOLUTION INTRAVENOUS CONTINUOUS
Status: ACTIVE | OUTPATIENT
Start: 2024-05-13 | End: 2024-05-13

## 2024-05-13 RX ORDER — LISINOPRIL 20 MG/1
20 TABLET ORAL 2 TIMES DAILY
Qty: 30 TABLET | Refills: 0
Start: 2024-05-13

## 2024-05-13 RX ORDER — CLOPIDOGREL 300 MG/1
600 TABLET, FILM COATED ORAL ONCE
Status: COMPLETED | OUTPATIENT
Start: 2024-05-13 | End: 2024-05-13

## 2024-05-13 RX ORDER — CLOPIDOGREL BISULFATE 75 MG/1
75 TABLET ORAL DAILY
Qty: 30 TABLET | Refills: 3 | Status: SHIPPED | OUTPATIENT
Start: 2024-05-14

## 2024-05-13 RX ORDER — ONDANSETRON 2 MG/ML
4 INJECTION INTRAMUSCULAR; INTRAVENOUS EVERY 6 HOURS PRN
Status: DISCONTINUED | OUTPATIENT
Start: 2024-05-13 | End: 2024-05-13 | Stop reason: SDUPTHER

## 2024-05-13 RX ORDER — CLOPIDOGREL BISULFATE 75 MG/1
75 TABLET ORAL DAILY
Status: DISCONTINUED | OUTPATIENT
Start: 2024-05-14 | End: 2024-05-14 | Stop reason: HOSPADM

## 2024-05-13 RX ORDER — INSULIN LISPRO 100 [IU]/ML
0-8 INJECTION, SOLUTION INTRAVENOUS; SUBCUTANEOUS
Status: DISCONTINUED | OUTPATIENT
Start: 2024-05-13 | End: 2024-05-14 | Stop reason: HOSPADM

## 2024-05-13 RX ORDER — EPTIFIBATIDE 2 MG/ML
INJECTION, SOLUTION INTRAVENOUS PRN
Status: DISCONTINUED | OUTPATIENT
Start: 2024-05-13 | End: 2024-05-13 | Stop reason: HOSPADM

## 2024-05-13 RX ADMIN — LISINOPRIL 20 MG: 20 TABLET ORAL at 21:25

## 2024-05-13 RX ADMIN — SODIUM CHLORIDE: 9 INJECTION, SOLUTION INTRAVENOUS at 13:20

## 2024-05-13 RX ADMIN — Medication 10 ML: at 21:28

## 2024-05-13 RX ADMIN — CLOPIDOGREL BISULFATE 600 MG: 300 TABLET, FILM COATED ORAL at 17:46

## 2024-05-13 RX ADMIN — ONDANSETRON 4 MG: 2 INJECTION INTRAMUSCULAR; INTRAVENOUS at 13:15

## 2024-05-13 RX ADMIN — DILTIAZEM HYDROCHLORIDE 240 MG: 240 CAPSULE, EXTENDED RELEASE ORAL at 21:25

## 2024-05-13 RX ADMIN — ROSUVASTATIN 20 MG: 20 TABLET, FILM COATED ORAL at 08:13

## 2024-05-13 RX ADMIN — INSULIN LISPRO 4 UNITS: 100 INJECTION, SOLUTION INTRAVENOUS; SUBCUTANEOUS at 16:57

## 2024-05-13 RX ADMIN — LISINOPRIL 20 MG: 20 TABLET ORAL at 08:13

## 2024-05-13 RX ADMIN — INSULIN LISPRO 1 UNITS: 100 INJECTION, SOLUTION INTRAVENOUS; SUBCUTANEOUS at 08:22

## 2024-05-13 RX ADMIN — SODIUM CHLORIDE, PRESERVATIVE FREE 10 ML: 5 INJECTION INTRAVENOUS at 21:28

## 2024-05-13 RX ADMIN — INSULIN GLARGINE 5 UNITS: 100 INJECTION, SOLUTION SUBCUTANEOUS at 08:14

## 2024-05-13 RX ADMIN — DILTIAZEM HYDROCHLORIDE 240 MG: 240 CAPSULE, EXTENDED RELEASE ORAL at 08:13

## 2024-05-13 RX ADMIN — ASPIRIN 81 MG: 81 TABLET, COATED ORAL at 08:13

## 2024-05-13 ASSESSMENT — PAIN SCALES - GENERAL
PAINLEVEL_OUTOF10: 0

## 2024-05-13 NOTE — DISCHARGE INSTR - OTHER ORDERS
Conclusion    [facility] Operative Note              FINDINGS   Artery Findings   LM Normal   LAD 30% Prox, 30% distal   Cx 30% Prox   RCA 50% mid, 90% distal --> stent to 0% Dominant   LVEDP 4 mmHg Normal 3-12mmHg      INTERVENTION(S)      Artery Location Intervention VICKIE-Pre VICKIE-Post Residual   RCA Distal Successful PCI to distal RCA with 2.75 x 28 mm Synergy (post dilated with 2.75 NC to high pressure) 3 3 None          POST CATH  RECOMMENDATIONS   General Continued aggressive medical therapy and risk factor modification   Medication DAPT for >/= 6 months and ASA 81 indefinitely   Consultation None   Procedural None           MEDICATION RECOMMENDATIONS   Recommendation Rx Detail Reason Not Prescribed   ASA ASA 81mg PO QD     P2Y12 Plavix 75mg PO QDAY     HI-STATIN Rosuvastatin  N/A   BETA BLOCKER N/A Hx intolerance   ACE/ARB/ARNI None Hypotension   ALDACTONE None Low BP          NON-MEDICATION RECOMMENDATIONS   Category Recommendation   EF ASSESSMENT Noninvasive assessment of EF as IP/OP as appropriate if LVG not performed.   TOBACCO Avoidance of first and second hand smoke.  Counseling provided.    DIET/EXERCISE Maintain healthy diet and exercise program.  Counseling provided.   CARDIAC REHAB Refer to OP cardiac rehab phase II.  Deferred if clinically inappropriate.

## 2024-05-13 NOTE — DISCHARGE INSTRUCTIONS
Please call and make an appointment with your PCP within 1 week  Please call and make an appointment with Cardiology  Please take all your medications as prescribed including any new ones on discharge    Post Radial Angiogram Instructions    Care of your puncture site:  Remove clear bandage 24 hours after the procedure.  May shower in 24 hours  Inspect the site daily and gently clean using soap and water.  Dry thoroughly and apply a Bandaid for 24 hours    Normal Observations:  Soreness or tenderness which may last one week.  Mild oozing from the incision site.  Possible bruising that could last a couple of weeks.    Activity:  You may resume driving 24 hours following the procedure.  You may resume normal activity in 3 days or after the wound heals.  Avoid lifting more than 10 pounds for 3 days with affected arm.  Limit use of your wrist for 1 day after you come home. For example, don't type, knit or twist your wrist.     Nutrition:  Low sodium, low saturated fat diet.  Drink at least 8 to 10 glasses of decaffeinated, non-alcoholic fluid for the next 24 hours to flush the procedure dye used for your angiogram out of your body.    Other Instructions  Hold Metformin or Metformin containing drugs for 48 hours after procedure.    Cardiac Rehab: [phone]  Cardiac rehab may be recommended by your physician  You may have been given a brief explanation of Cardiac Rehab and instructions how to call Cardiac Rehab if referred.      Call your doctor immediately if your condition worsens, for any other concerns, for a follow-up appointment or if you experience any of the following:  Significant bleeding that does not stop after 10 MINUTES of applying FIRM pressure on the puncture site.  Increased swelling of the wrist.  Unusual pain, numbness, or tingling of the wrist/arm.   Any signs of infection such as: redness, yellow drainage at the site, swelling or pain. EXPECT bruising.

## 2024-05-13 NOTE — PRE SEDATION
Brief Pre-Op Note/Sedation Assessment      Linn Aguayo  1950  [id number]  9:47 AM    Planned Procedure: Cardiac Catheterization Procedure  Post Procedure Plan: Return to same level of care  Consent: I have discussed with the patient and/or the patient representative the indication, alternatives, and the possible risks and/or complications of the planned procedure and the anesthesia methods. The patient and/or patient representative appear to understand and agree to proceed.        Chief Complaint:   Chest Pain/Pressure  NSTEMI      Indications for Cath Procedure:  Presentation:  ACS > 24 hrs, Worsening Angina, and Suspected CAD  2.  Anginal Classification within 2 weeks:  CCS III - Symptoms with everyday living activities, i.e., moderate limitation  3.  Angina Symptoms Assessment:  Typical Chest Pain  4.  Heart Failure Class within last 2 weeks:  No symptoms  5.  Cardiovascular Instability:  No    Prior Ischemic Workup/Eval:  Pre-Procedural Medications: Yes: Aspirin, Ca Channel Blockers, and STATIN  2.   Stress Test Completed?  Yes:  Stress or Imaging Studies Performed (within ANY time period):   Type:  Stress Echo  Results:  Positive:  OtherFindings suggestive of inferior and lateral scar, but no obvious ischemia Extent of Ischemia:  Low Risk (<1% annual death or MI)    Does Patient need surgery?  Cath Valve Surgery:  No    Pre-Procedure Medical History:  Vital Signs:  /64   Pulse 92   Temp 98.2 °F (36.8 °C) (Temporal)   Resp 20   Ht 1.626 m (5' 4\")   Wt 62 kg (136 lb 11 oz)   SpO2 99%   BMI 23.46 kg/m²     Allergies:    Allergies   Allergen Reactions    Doxycycline Nausea And Vomiting    Asa Arthritis Strength-Antacid [Aspirin Buff, Al Hyd-Mg Hyd] Nausea Only     Per patient stopped taking because it upset her stomach. She denies itching, swelling, rash.     Aspirin      Other reaction(s): GI Upset    Atorvastatin      Other reaction(s): Muscle Aches    Erythromycin Base Hives

## 2024-05-13 NOTE — CARE COORDINATION
CM reviewed chart at this time for discharge planning.    Pt from home. Has insurance and PCP. Pt had cardiac cath. No therapy ordered at this time.    CM will follow for discharge planning.      Katie Whitten RN, BSN  [phone]

## 2024-05-13 NOTE — DISCHARGE INSTR - DIET
Good nutrition is important when healing from an illness, injury, or surgery.  Follow any nutrition recommendations given to you during your hospital stay.   If you were given an oral nutrition supplement while in the hospital, continue to take this supplement at home.  You can take it with meals, in-between meals, and/or before bedtime. These supplements can be purchased at most local grocery stores, pharmacies, and chain [employer]-stores.   If you have any questions about your diet or nutrition, call the hospital and ask for the dietitian.    LOW SALT, LOW SATURATED FAT DIET   Linus Grant is a 26 y.o. gentleman with acute perforated appendicitis s/p laparoscopic appendectomy on 12/20/22. Tolerating a  regular diet.     - Regular diet  - PO cipro/flagyl  - PO pain, nausea meds    Dispo: DC today with 5-days of oral antibiotics. RTC in 2 weeks with Dr. Helms

## 2024-05-13 NOTE — BRIEF OP NOTE
[facility] Operative Note              FINDINGS   Artery Findings   LM Normal   LAD 30% Prox, 30% distal   Cx 30% Prox   RCA 50% mid, 90% distal --> stent to 0% Dominant   LVEDP 4 mmHg Normal 3-12mmHg      INTERVENTION(S)      Artery Location Intervention VICKIE-Pre VICKIE-Post Residual   RCA Distal Successful PCI to distal RCA with 2.75 x 28 mm Synergy (post dilated with 2.75 NC to high pressure) 3 3 None          POST CATH  RECOMMENDATIONS   General Continued aggressive medical therapy and risk factor modification   Medication DAPT for >/= 6 months and ASA 81 indefinitely   Consultation None   Procedural None

## 2024-05-13 NOTE — DISCHARGE SUMMARY
V2.0  Discharge Summary    Name:  Linn Aguayo /Age/Sex: 1950 (74 y.o. female)   Admit Date: 5/10/2024  Discharge Date: 24    MRN & CSN:  [id number] & [id number] Encounter Date and Time 24 7:14 PM EDT    Attending:  Antonella Sim MD Discharging Provider: Antonella Sim MD       Hospital Course:     Brief HPI: Linn Aguayo is a 74 y.o. female with pmh of HTN, HLD, CAD with posterior MI 2018  who presents with epigastric x 3 days.  Pain similar to previous MI, pressure-like, intermittent radiating up into the chest more of a pressure sensation it did start radiating up into her chest.  In the ED EKG showed no acute ischemic changes but hs-troponin was elevated 138.     NSTEMI:   Peak hs-troponin 167.    Cardiology consulted: s/p Successful PCI to distal RCA 24  Continue Plavix, ASA, Statin  Cleared by cardiologist for discharge.  Out patient follow up with Dr. Osullivan in 2 weeks.     Hypertension: BP controlled on Cardizem and lisinopril.     Hyperlipidemia: Continue statin     Type II DM: Monitor blood glucose on basal insulin and SSI.  A1c 8.1% 4/15/2024.  Resumed Metformin, Jardiance and glipizide on discharge.    The patient expressed appropriate understanding of, and agreement with the discharge recommendations, medications, and plan.     Consults this admission:  IP CONSULT TO CARDIOLOGY  IP CONSULT TO CARDIAC REHAB  IP CONSULT TO CARDIAC REHAB    Discharge Diagnosis:   NSTEMI (non-ST elevated myocardial infarction) (HCC)    Discharge Instruction:   Follow up appointments: Cardiology  Primary care physician: Lyssa Barr DO within 2 weeks  Diet: cardiac diet   Activity: activity as tolerated  Disposition: Discharged to:   [x]Home, []HHC, []SNF, []Acute Rehab, []Hospice   Condition on discharge: Stable  Labs and Tests to be Followed up as an outpatient by PCP or Specialist: None    Discharge Medications:        Medication List        START taking these

## 2024-05-14 VITALS
HEIGHT: 64 IN | BODY MASS INDEX: 23.34 KG/M2 | SYSTOLIC BLOOD PRESSURE: 155 MMHG | OXYGEN SATURATION: 95 % | WEIGHT: 136.69 LBS | DIASTOLIC BLOOD PRESSURE: 69 MMHG | HEART RATE: 82 BPM | TEMPERATURE: 97.4 F | RESPIRATION RATE: 15 BRPM

## 2024-05-14 PROCEDURE — 6370000000 HC RX 637 (ALT 250 FOR IP): Performed by: STUDENT IN AN ORGANIZED HEALTH CARE EDUCATION/TRAINING PROGRAM

## 2024-05-14 PROCEDURE — 6370000000 HC RX 637 (ALT 250 FOR IP): Performed by: INTERNAL MEDICINE

## 2024-05-14 PROCEDURE — 99231 SBSQ HOSP IP/OBS SF/LOW 25: CPT | Performed by: INTERNAL MEDICINE

## 2024-05-14 PROCEDURE — 6370000000 HC RX 637 (ALT 250 FOR IP): Performed by: NURSE PRACTITIONER

## 2024-05-14 RX ADMIN — ASPIRIN 81 MG: 81 TABLET, COATED ORAL at 08:24

## 2024-05-14 RX ADMIN — DILTIAZEM HYDROCHLORIDE 240 MG: 240 CAPSULE, EXTENDED RELEASE ORAL at 08:24

## 2024-05-14 RX ADMIN — LISINOPRIL 20 MG: 20 TABLET ORAL at 08:24

## 2024-05-14 RX ADMIN — ROSUVASTATIN 20 MG: 20 TABLET, FILM COATED ORAL at 08:24

## 2024-05-14 RX ADMIN — CLOPIDOGREL BISULFATE 75 MG: 75 TABLET ORAL at 08:24

## 2024-05-14 RX ADMIN — INSULIN GLARGINE 5 UNITS: 100 INJECTION, SOLUTION SUBCUTANEOUS at 08:24

## 2024-05-14 NOTE — CARE COORDINATION
Patient discharged 5/14/2024 to home.  All discharge needs met per case management.    Katie Whitten RN, BSN  [phone]

## 2024-05-14 NOTE — CARE COORDINATION
05/14/24 0902   IMM Letter   IMM Letter given to Patient/Family/Significant other/Guardian/POA/by: Katie Whitten RN CM   IMM Letter date given: 05/14/24   IMM Letter time given: 0855  (copy made for hard chart)

## 2024-05-14 NOTE — PROGRESS NOTES
05/11/24 0251   Treatment   Treatment Type EKG       
  [facility] Daily Progress Note      Admit Date:  5/10/2024    Chief Complaint:  Chest pressure, NSTEMI    Subjective:  Ms. Aguayo has intermittent, on and off chest pain. Gone on Saturday, but has returned and responded to nitro drip. No new complaints or concerns otherwise.    Objective:   /64   Pulse 92   Temp 98.2 °F (36.8 °C) (Temporal)   Resp 20   Ht 1.626 m (5' 4\")   Wt 62 kg (136 lb 11 oz)   SpO2 99%   BMI 23.46 kg/m²     Intake/Output Summary (Last 24 hours) at 5/13/2024 0955  Last data filed at 5/13/2024 0441  Gross per 24 hour   Intake 777.38 ml   Output 700 ml   Net 77.38 ml       Physical Exam:  General:  Awake, alert, NAD  Skin:  Warm and dry  Neck:  JVD not visualized sitting upright  Chest:  CTAB, Comfortable on room air  Cardiovascular:  RRR S1S2, no S3, No murmur  Abdomen:  Soft, ND, NT, No HSM  Extremities:  No edema    Medications:    insulin lispro  0-8 Units SubCUTAneous TID WC    insulin lispro  0-4 Units SubCUTAneous Nightly    insulin glargine  5 Units SubCUTAneous Daily    aspirin EC  81 mg Oral Daily    lisinopril  20 mg Oral BID    rosuvastatin  20 mg Oral Daily    sodium chloride flush  5-40 mL IntraVENous 2 times per day    dilTIAZem  240 mg Oral BID      heparin (PORCINE) Infusion 18 Units/kg/hr (05/13/24 0441)    dextrose      sodium chloride      nitroGLYCERIN 5 mcg/min (05/13/24 0441)     heparin (porcine), heparin (porcine), dextrose bolus **OR** dextrose bolus, glucagon (rDNA), dextrose, sodium chloride flush, sodium chloride, potassium chloride **OR** potassium alternative oral replacement **OR** potassium chloride, magnesium sulfate, ondansetron **OR** ondansetron, polyethylene glycol, acetaminophen **OR** acetaminophen, hydrALAZINE    TELEMETRY (Personally reviewed by me): Sinus     Lab Data:  CBC:   Recent Labs     05/10/24  2119 05/12/24  0300 05/13/24  0403   WBC 7.8 5.9 5.7   HGB 13.7 12.5 13.3   HCT 40.8 37.3 38.6   MCV 91.1 91.2 90.9    
Discharge:    IV and telemetry discontinued. Discharge information, medications, and follow-up instructions reviewed with pt. Time was allowed for discussion and questions, and pt verbalized understanding of instructions. Pt will make his own follow-up appointment with MD.  Pt left unit via wheelchair and is being discharged to private residence.    Electronically signed by Allie Harding RN on 5/14/2024 at 9:29 AM    
Got a call regarding the patient who is admitted with chest pain being treated for NSTEMI on heparin drip. EKG shows inverted T waves in the inferior leads.    She got up and went to the bathroom, chest pain recurred 6/10. Trop trend 138->167->160.     No new EKG changes.    Plan:  - I agree with nitroglycerin drip.   - continue heparin drip  - will be seen by rounding cardiologist this morning.     Geoffrey Simental MD,   Cardiac Electrophysiology  [place], [place] [id number] [phone]    
Patient seen and examined by one of my partners earlier today.    I agree with their assessment and plan.   Patient also seen and examined by me today.  Chart reviewed.    Necessary orders placed.  Will continue to follow while in the hospital.        Antonella Sim MD   
Pt to transfer to CVU for nitro drip. Pt and daughter aware of transfer.   
[facility]   Cardiology Progress Note     Admit Date: 5/10/2024     Reason for follow up: NSTEMI    HPI and Interval History:   Patient seen and examined. Clinical notes reviewed.   Telemetry reviewed. No new complaint today.   No major events overnight.   Denies having chest pain, shortness of breath, dyspnea on exertion, Orthopnea, PND at the time of this visit.  She is feeling fine today.  Review of System:  All other systems reviewed and are negative except for that noted above. Pertinent negatives are:     General: negative for fever, chills   Ophthalmic ROS: negative for - eye pain or loss of vision  ENT ROS: negative for - headaches, sore throat   Respiratory: negative for - cough, sputum  Cardiovascular: Reviewed in HPI  Gastrointestinal: negative for - abdominal pain, diarrhea, N/V  Hematology: negative for - bleeding, blood clots, bruising or jaundice  Genito-Urinary:  negative for - Dysuria or incontinence  Musculoskeletal: negative for - Joint swelling, muscle pain  Neurological: negative for - confusion, dizziness, headaches   Psychiatric: No anxiety, no depression.  Dermatological: negative for - rash      Physical Examination:  Vitals:    24 0600   BP: 119/64   Pulse: 68   Resp:    Temp:    SpO2:       In: 636.8 [P.O.:340; I.V.:296.8]  Out: -    Wt Readings from Last 3 Encounters:   24 54.6 kg (120 lb 5.9 oz)   04/15/24 63.9 kg (140 lb 12.8 oz)   24 62.4 kg (137 lb 8 oz)     Temp  Av °F (36.7 °C)  Min: 97.5 °F (36.4 °C)  Max: 98.6 °F (37 °C)  Pulse  Av.7  Min: 68  Max: 84  BP  Min: 117/54  Max: 161/71  SpO2  Av.8 %  Min: 93 %  Max: 98 %    Intake/Output Summary (Last 24 hours) at 2024 1007  Last data filed at 2024 0600  Gross per 24 hour   Intake 396.84 ml   Output --   Net 396.84 ml       Telemetry: Sinus rhythm   Constitutional: Oriented. No distress.   Head: Normocephalic and atraumatic.   Mouth/Throat: Oropharynx is clear and moist.   Eyes: 
Pulmonary Arteries: Pulmonary arteries are adequately opacified for evaluation.  No evidence of intraluminal filling defect to suggest pulmonary embolism.  Main pulmonary artery is normal in caliber. Mediastinum: The thoracic aorta is normal in caliber with mild atherosclerosis.  No acute aortic abnormality identified.  Coronary artery atherosclerotic vascular calcifications are seen.  No acute abnormality in the branch vessels of the superior mediastinum and lower neck.  The heart is normal in size.  No pericardial effusion.  The mediastinal esophagus and thyroid gland are unremarkable.  No lymphadenopathy or pneumomediastinum. Lungs/pleura: The central airways are patent.  No pleural effusion or pneumothorax.  No consolidation or interlobular septal thickening. Soft Tissues/Bones: No acute bone or soft tissue abnormality. CT ABDOMEN AND PELVIS Organs: The liver and gallbladder are unremarkable.  No biliary ductal dilatation is identified.  The pancreas, spleen, and bilateral adrenal glands are unremarkable.  The bilateral kidneys and ureters are unremarkable. GI/Bowel: Normal appendix.  Colonic diverticulosis without evidence of acute diverticulitis.  The stomach and small bowel are normal in appearance.  No obstruction or wall thickening identified. Pelvis: The urinary bladder is normal in appearance.  The uterus and bilateral adnexae are unremarkable.  No free fluid in the pelvis.  No pelvic or inguinal lymphadenopathy. Peritoneum/Retroperitoneum: The abdominal aorta is normal in caliber with moderate atherosclerosis.  No acute vascular abnormality identified.  No retroperitoneal or mesenteric lymphadenopathy is identified.  No free air or fluid is seen in the abdomen. Bones/Soft Tissues: No acute osseous or soft tissue abnormality.     No acute abnormality in the chest, abdomen, or pelvis.     CT CHEST PULMONARY EMBOLISM W CONTRAST    Result Date: 5/10/2024  EXAMINATION: CTA OF THE CHEST; CT OF THE ABDOMEN AND 
AM    BILITOT 0.3 05/12/2024 03:00 AM    ALKPHOS 82 05/12/2024 03:00 AM    AST 22 05/12/2024 03:00 AM    ALT 14 05/12/2024 03:00 AM     PT/INR:  No results found for: \"PTINR\"  Lab Results   Component Value Date    TROPONINI <0.01 04/16/2021       EKG:  I have reviewed EKG with the following interpretation:  Impression:  normal sinus rhythm, nonspecific st-t wave changes     stress: Abnormal stress echocardiogram suggestive of inferior and lateral scar, but   no obvious ischemia.   Limited exercise tolerance with shoulder pain.      Recommendation   Discussed with patient and Dr. Cuello.   Dr. Cuello will review  Echo:Left ventricular cavity size is normal with normal left ventricular wall   thickness.   Overall left ventricular systolic function appears normal. Ejection fraction   is visually estimated to be 50-55%.   There is mild hypokinesis of the basal to mid inferolateral wall.   Grade I diastolic dysfunction with normal LV filling pressures.   Normal function of all valves.     Cath:   [facility] Operative Note              FINDINGS   Artery Findings   LM Normal   LAD 30% Prox, 30% distal   Cx 30% Prox   RCA 50% mid, 90% distal --> stent to 0% Dominant   LVEDP 4 mmHg Normal 3-12mmHg      INTERVENTION(S)      Artery Location Intervention VICKIE-Pre VICKIE-Post Residual   RCA Distal Successful PCI to distal RCA with 2.75 x 28 mm Synergy (post dilated with 2.75 NC to high pressure) 3 3 None          POST CATH  RECOMMENDATIONS   General Continued aggressive medical therapy and risk factor modification   Medication DAPT for >/= 6 months and ASA 81 indefinitely   Consultation None   Procedural None           MEDICATION RECOMMENDATIONS   Recommendation Rx Detail Reason Not Prescribed   ASA ASA 81mg PO QD     P2Y12 Plavix 75mg PO QDAY     HI-STATIN Rosuvastatin  N/A   BETA BLOCKER N/A Hx intolerance   ACE/ARB/ARNI None Hypotension   ALDACTONE None Low BP          NON-MEDICATION RECOMMENDATIONS   Category 
esophagus and thyroid gland are unremarkable.  No lymphadenopathy or pneumomediastinum. Lungs/pleura: The central airways are patent.  No pleural effusion or pneumothorax.  No consolidation or interlobular septal thickening. Soft Tissues/Bones: No acute bone or soft tissue abnormality. CT ABDOMEN AND PELVIS Organs: The liver and gallbladder are unremarkable.  No biliary ductal dilatation is identified.  The pancreas, spleen, and bilateral adrenal glands are unremarkable.  The bilateral kidneys and ureters are unremarkable. GI/Bowel: Normal appendix.  Colonic diverticulosis without evidence of acute diverticulitis.  The stomach and small bowel are normal in appearance.  No obstruction or wall thickening identified. Pelvis: The urinary bladder is normal in appearance.  The uterus and bilateral adnexae are unremarkable.  No free fluid in the pelvis.  No pelvic or inguinal lymphadenopathy. Peritoneum/Retroperitoneum: The abdominal aorta is normal in caliber with moderate atherosclerosis.  No acute vascular abnormality identified.  No retroperitoneal or mesenteric lymphadenopathy is identified.  No free air or fluid is seen in the abdomen. Bones/Soft Tissues: No acute osseous or soft tissue abnormality.     No acute abnormality in the chest, abdomen, or pelvis.       CBC:   Recent Labs     05/10/24  2119 05/12/24  0300   WBC 7.8 5.9   HGB 13.7 12.5    198     BMP:    Recent Labs     05/10/24  2119 05/12/24  0300    137   K 4.1 4.2   CL 98* 101   CO2 22 22   BUN 16 18   CREATININE 0.6 0.7   GLUCOSE 259* 240*     Hepatic:   Recent Labs     05/10/24  2119 05/12/24  0300   AST 22 22   ALT 11 14   BILITOT 0.3 0.3   ALKPHOS 91 82     Lipids:   Lab Results   Component Value Date/Time    CHOL 176 05/11/2024 06:05 AM    HDL 48 05/11/2024 06:05 AM    HDL 49 09/27/2011 09:20 AM    TRIG 303 05/11/2024 06:05 AM     Hemoglobin A1C:   Lab Results   Component Value Date/Time    LABA1C 8.1 04/15/2024 12:22 PM     TSH:   Lab

## 2024-05-15 ENCOUNTER — CARE COORDINATION (OUTPATIENT)
Dept: CASE MANAGEMENT | Age: 74
End: 2024-05-15

## 2024-05-15 NOTE — CARE COORDINATION
Care Transitions Outreach Attempt    Call within 2 business days of discharge: Yes     Attempted to reach patient for transitions of care follow up. Unable to reach patient.    Patient: Linn Aguayo Patient : 1950 MRN: [id number]    Last Discharge Facility       Date Complaint Diagnosis Description Type Department Provider    5/10/24 Back Pain; Abdominal Pain Coronary artery disease due to lipid rich plaque ... ED to Hosp-Admission (Discharged) (ADMITTED) [facility] CVICU Brandon Fontana MD; Corey Reyes...              Was this an external facility discharge? No Discharge Facility Name:     Noted following upcoming appointments from discharge chart review:   BS follow up appointment(s):   Future Appointments   Date Time Provider Department Center   2024  1:30 PM Karina Beaver, YAYA - CNP  [facility]   2024  1:40 PM [facility]   2024  1:30 PM Paul Cuello MD  [facility]     Non-BS  follow up appointment(s):

## 2024-05-16 ENCOUNTER — CARE COORDINATION (OUTPATIENT)
Dept: CASE MANAGEMENT | Age: 74
End: 2024-05-16

## 2024-05-16 ENCOUNTER — TELEPHONE (OUTPATIENT)
Dept: INTERNAL MEDICINE CLINIC | Age: 74
End: 2024-05-16

## 2024-05-16 DIAGNOSIS — I21.4 NSTEMI (NON-ST ELEVATED MYOCARDIAL INFARCTION) (HCC): Primary | ICD-10-CM

## 2024-05-16 PROCEDURE — 1111F DSCHRG MED/CURRENT MED MERGE: CPT | Performed by: INTERNAL MEDICINE

## 2024-05-16 NOTE — CARE COORDINATION
Care Transitions Initial Follow Up Call    Call within 2 business days of discharge: Yes    Patient Current Location:  Home: [address] [place] [id number]    Care Transition Nurse contacted the patient by telephone to perform post hospital discharge assessment. Verified name and  with patient as identifiers. Provided introduction to self, and explanation of the Care Transition Nurse role.     Patient: Linn Aguayo Patient : 1950   MRN: [id number]  Reason for Admission:   Discharge Date: 24 RARS: Readmission Risk Score: 5.2      Last Discharge Facility       Date Complaint Diagnosis Description Type Department Provider    5/10/24 Back Pain; Abdominal Pain Coronary artery disease due to lipid rich plaque ... ED to Hosp-Admission (Discharged) (ADMITTED) [facility] CVICU Brandon Fontana MD; Corey Reyes...            Was this an external facility discharge? No Discharge Facility:     Challenges to be reviewed by the provider   Additional needs identified to be addressed with provider: No  none               Method of communication with provider: none.    Pt states doing well, no issues or concerns. Wrist incision CDI.Denies CP. This nurse attempted to schedule a hospital f/u appt with PCP but there were no slots available, pt was going to call. This nurse will send message to office as well. Agreed to more CTC f/u calls.      Care Transition Nurse reviewed discharge instructions with patient who verbalized understanding. The patient was given an opportunity to ask questions and does not have any further questions or concerns at this time. Were discharge instructions available to patient? Yes. Reviewed appropriate site of care based on symptoms and resources available to patient including: When to call 911. The patient agrees to contact the PCP office for questions related to their healthcare.     Advance Care Planning:   Does patient have an Advance Directive: not on file.    Medication

## 2024-05-17 ENCOUNTER — OFFICE VISIT (OUTPATIENT)
Dept: INTERNAL MEDICINE CLINIC | Age: 74
End: 2024-05-17

## 2024-05-17 VITALS
HEIGHT: 64 IN | OXYGEN SATURATION: 98 % | WEIGHT: 139 LBS | DIASTOLIC BLOOD PRESSURE: 86 MMHG | BODY MASS INDEX: 23.73 KG/M2 | HEART RATE: 86 BPM | SYSTOLIC BLOOD PRESSURE: 150 MMHG

## 2024-05-17 DIAGNOSIS — E78.5 HYPERLIPIDEMIA LDL GOAL <70: ICD-10-CM

## 2024-05-17 DIAGNOSIS — I25.10 CORONARY ARTERY DISEASE INVOLVING NATIVE CORONARY ARTERY OF NATIVE HEART WITHOUT ANGINA PECTORIS: ICD-10-CM

## 2024-05-17 DIAGNOSIS — I10 PRIMARY HYPERTENSION: ICD-10-CM

## 2024-05-17 DIAGNOSIS — E11.65 TYPE 2 DIABETES MELLITUS WITH HYPERGLYCEMIA, WITHOUT LONG-TERM CURRENT USE OF INSULIN (HCC): ICD-10-CM

## 2024-05-17 DIAGNOSIS — Z09 HOSPITAL DISCHARGE FOLLOW-UP: Primary | ICD-10-CM

## 2024-05-17 LAB — ECHO BSA: 1.68 M2

## 2024-05-17 ASSESSMENT — PATIENT HEALTH QUESTIONNAIRE - PHQ9
SUM OF ALL RESPONSES TO PHQ QUESTIONS 1-9: 1
SUM OF ALL RESPONSES TO PHQ QUESTIONS 1-9: 1
2. FEELING DOWN, DEPRESSED OR HOPELESS: SEVERAL DAYS
1. LITTLE INTEREST OR PLEASURE IN DOING THINGS: NOT AT ALL
SUM OF ALL RESPONSES TO PHQ QUESTIONS 1-9: 1
SUM OF ALL RESPONSES TO PHQ QUESTIONS 1-9: 1
SUM OF ALL RESPONSES TO PHQ9 QUESTIONS 1 & 2: 1

## 2024-05-17 ASSESSMENT — ENCOUNTER SYMPTOMS
SHORTNESS OF BREATH: 0
CHEST TIGHTNESS: 0

## 2024-05-17 NOTE — PROGRESS NOTES
as: PLAVIX  Take 1 tablet by mouth daily     diclofenac sodium 1 % Gel  Commonly known as: VOLTAREN  Apply 2 g topically 4 times daily as needed for Pain     dilTIAZem 240 MG extended release capsule  Commonly known as: TIAZAC  Take 1 capsule by mouth 2 times daily     empagliflozin 25 MG tablet  Commonly known as: Jardiance  Take 1 tablet by mouth daily     glipiZIDE 2.5 MG extended release tablet  Commonly known as: GLUCOTROL XL  Take 1 tablet by mouth 2 times daily (with meals) with meals     lisinopril 20 MG tablet  Commonly known as: PRINIVIL;ZESTRIL  Take 1 tablet by mouth 2 times daily     metFORMIN 500 MG extended release tablet  Commonly known as: GLUCOPHAGE-XR  Take 3 tablets by mouth daily (with breakfast)     nitroGLYCERIN 0.4 MG SL tablet  Commonly known as: Nitrostat  Place 1 tablet under the tongue every 5 minutes as needed for Chest pain     rosuvastatin 20 MG tablet  Commonly known as: CRESTOR  Take 2 tablets by mouth daily     vitamin B-12 1000 MCG tablet  Commonly known as: CYANOCOBALAMIN     VITAMIN D PO               Medications marked \"taking\" at this time  Outpatient Medications Marked as Taking for the 5/17/24 encounter (Office Visit) with Fermín Campos MD   Medication Sig Dispense Refill    clopidogrel (PLAVIX) 75 MG tablet Take 1 tablet by mouth daily 30 tablet 3    lisinopril (PRINIVIL;ZESTRIL) 20 MG tablet Take 1 tablet by mouth 2 times daily 30 tablet 0    rosuvastatin (CRESTOR) 20 MG tablet Take 2 tablets by mouth daily 90 tablet 3    empagliflozin (JARDIANCE) 25 MG tablet Take 1 tablet by mouth daily 30 tablet 5    ACCU-CHEK GUIDE strip TEST BLOOD SUGAR ONCE DAILY AND AS NEEDED FOR SYMPTOMS OF IRREGULAR BLOOD GLUCOSE 100 strip 3    glipiZIDE (GLUCOTROL XL) 2.5 MG extended release tablet Take 1 tablet by mouth 2 times daily (with meals) with meals 180 tablet 3    diclofenac sodium (VOLTAREN) 1 % GEL Apply 2 g topically 4 times daily as needed for Pain 100 g 1    metFORMIN

## 2024-05-20 ENCOUNTER — CARE COORDINATION (OUTPATIENT)
Dept: CASE MANAGEMENT | Age: 74
End: 2024-05-20

## 2024-05-20 RX ORDER — DILTIAZEM HYDROCHLORIDE 240 MG/1
240 CAPSULE, EXTENDED RELEASE ORAL 2 TIMES DAILY
Qty: 180 CAPSULE | Refills: 1 | Status: SHIPPED | OUTPATIENT
Start: 2024-05-20

## 2024-05-20 NOTE — CARE COORDINATION
Care Transitions     Per [facility], pt had a Hospital f/u appt with her PCP on 24, this nurse will follow up at a later date.      Patient: Linn Aguayo Patient : 1950 MRN: [id number]    Last Discharge Facility       Date Complaint Diagnosis Description Type Department Provider    5/10/24 Back Pain; Abdominal Pain Coronary artery disease due to lipid rich plaque ... ED to Hosp-Admission (Discharged) (ADMITTED) [facility] Brandon Crockett MD; Corey Reyes...              BS follow up appointment(s):   Future Appointments   Date Time Provider Department Center   2024  1:30 PM Karina Beaver, YAYA - CNP [facility]   2024 11:40 AM Lyssa Barr DO [facility]  [place] [facility]   2024  1:30 PM Paul Cuello MD [facility]     Non-BS  follow up appointment(s):

## 2024-05-23 ENCOUNTER — CARE COORDINATION (OUTPATIENT)
Dept: CASE MANAGEMENT | Age: 74
End: 2024-05-23

## 2024-05-23 NOTE — CARE COORDINATION
Care Transitions Follow Up Call    Patient Current Location:  Home: [address] [place] [id number]    Care Transition Nurse contacted the patient by telephone to follow up after admission.  Verified name and  with patient as identifiers.    Patient: Linn Aguayo  Patient : 1950   MRN: [id number]  Reason for Admission:   Discharge Date: 24 RARS: Readmission Risk Score: 5.2      Needs to be reviewed by the provider   Additional needs identified to be addressed with provider: No  none             Method of communication with provider: none.    Pt states doing well, no issues or concerns just tired.F/U appts listed below. Agreed to more CTC f/u calls.      Addressed changes since last contact:  none  Discussed follow-up appointments. If no appointment was previously scheduled, appointment scheduling offered: Yes.   Is follow up appointment scheduled within 7 days of discharge? Yes.    Follow Up  Future Appointments   Date Time Provider Department Center   2024  1:30 PM Karina Bevaer, APRN - CNP FF Cardio MMA   2024 11:40 AM Lyssa Barr DO [facility] [place] [facility]   2024  1:30 PM Paul Cuello MD  Cardio MMA     External follow up appointment(s):     Care Transition Nurse reviewed medical action plan with patient and discussed any barriers to care and/or understanding of plan of care after discharge. Discussed appropriate site of care based on symptoms and resources available to patient including: When to call 911. The patient agrees to contact the PCP office for questions related to their healthcare.     Advance Care Planning:   not on file; education provided.     Offered patient enrollment in the Remote Patient Monitoring (RPM) program for in-home monitoring: Yes, but did not enroll at this time: controlled chronic disease management.     Care Transitions Subsequent and Final Call    Subsequent and Final Calls  Do you have any ongoing symptoms?: No  Have your

## 2024-05-30 ENCOUNTER — OFFICE VISIT (OUTPATIENT)
Dept: CARDIOLOGY CLINIC | Age: 74
End: 2024-05-30
Payer: MEDICARE

## 2024-05-30 ENCOUNTER — CARE COORDINATION (OUTPATIENT)
Dept: CASE MANAGEMENT | Age: 74
End: 2024-05-30

## 2024-05-30 VITALS
BODY MASS INDEX: 23.9 KG/M2 | DIASTOLIC BLOOD PRESSURE: 60 MMHG | WEIGHT: 140 LBS | OXYGEN SATURATION: 95 % | HEIGHT: 64 IN | HEART RATE: 79 BPM | SYSTOLIC BLOOD PRESSURE: 142 MMHG

## 2024-05-30 DIAGNOSIS — E78.5 HYPERLIPIDEMIA LDL GOAL <70: ICD-10-CM

## 2024-05-30 DIAGNOSIS — I25.10 CORONARY ARTERY DISEASE INVOLVING NATIVE CORONARY ARTERY OF NATIVE HEART WITHOUT ANGINA PECTORIS: Primary | ICD-10-CM

## 2024-05-30 DIAGNOSIS — I10 PRIMARY HYPERTENSION: ICD-10-CM

## 2024-05-30 PROCEDURE — 3077F SYST BP >= 140 MM HG: CPT | Performed by: NURSE PRACTITIONER

## 2024-05-30 PROCEDURE — 3078F DIAST BP <80 MM HG: CPT | Performed by: NURSE PRACTITIONER

## 2024-05-30 PROCEDURE — 1111F DSCHRG MED/CURRENT MED MERGE: CPT | Performed by: NURSE PRACTITIONER

## 2024-05-30 PROCEDURE — G8427 DOCREV CUR MEDS BY ELIG CLIN: HCPCS | Performed by: NURSE PRACTITIONER

## 2024-05-30 PROCEDURE — 1090F PRES/ABSN URINE INCON ASSESS: CPT | Performed by: NURSE PRACTITIONER

## 2024-05-30 PROCEDURE — 1124F ACP DISCUSS-NO DSCNMKR DOCD: CPT | Performed by: NURSE PRACTITIONER

## 2024-05-30 PROCEDURE — G8399 PT W/DXA RESULTS DOCUMENT: HCPCS | Performed by: NURSE PRACTITIONER

## 2024-05-30 PROCEDURE — 1036F TOBACCO NON-USER: CPT | Performed by: NURSE PRACTITIONER

## 2024-05-30 PROCEDURE — 3017F COLORECTAL CA SCREEN DOC REV: CPT | Performed by: NURSE PRACTITIONER

## 2024-05-30 PROCEDURE — 99214 OFFICE O/P EST MOD 30 MIN: CPT | Performed by: NURSE PRACTITIONER

## 2024-05-30 PROCEDURE — G8420 CALC BMI NORM PARAMETERS: HCPCS | Performed by: NURSE PRACTITIONER

## 2024-05-30 RX ORDER — NITROGLYCERIN 0.4 MG/1
0.4 TABLET SUBLINGUAL EVERY 5 MIN PRN
Qty: 25 TABLET | Refills: 1 | Status: SHIPPED | OUTPATIENT
Start: 2024-05-30

## 2024-05-30 NOTE — PROGRESS NOTES
[facility]     Outpatient Follow Up Note    CHIEF COMPLAINT / HPI: Hospital Follow Up secondary to status post coronary artery stenting    Hospital record has been reviewed  Hospital Course progressed as follows per discharge summary:     Brief HPI: Linn Aguayo is a 74 y.o. female with pmh of HTN, HLD, CAD with posterior MI 4/2018  who presents with epigastric x 3 days.  Pain similar to previous MI, pressure-like, intermittent radiating up into the chest more of a pressure sensation it did start radiating up into her chest.  In the ED EKG showed no acute ischemic changes but hs-troponin was elevated 138.     NSTEMI:   Peak hs-troponin 167.    Cardiology consulted: s/p Successful PCI to distal RCA 5/13/24  Continue Plavix, ASA, Statin  Cleared by cardiologist for discharge.  Out patient follow up with Dr. Osullivan in 2 weeks.     Hypertension: BP controlled on Cardizem and lisinopril.     Hyperlipidemia: Continue statin     Type II DM: Monitor blood glucose on basal insulin and SSI.  A1c 8.1% 4/15/2024.  Resumed Metformin, Jardiance and glipizide on discharge.     Linn Aguayo is 74 y.o. female who presents today for a routine follow up after a recent hospitalization related to the above mentioned issues.  Subjective:   Linn Aguayo has a significant past medical history of s/p acute posterior wall MI April 2018 (progressive exertional chest discomfort). LHC showed 100% OM1 which was stented.  in addition to what is mentioned above.     Mid chest, epigastric area, and in back.      Today, she denies significant chest pain. No further mid chest discomfort.  Having some epigastric discomfort when she eats. No black tarry stool or emesis. Hx of GERD. Tums helps. She plans to have colonoscopy and will discuss upper GI issues. Pt prefers to discuss GI concerns with PCP. There is no SOB/NULL. The patient denies orthopnea/PND. Denies swelling and her weight is stable. The patient is not

## 2024-05-30 NOTE — CARE COORDINATION
Care Transitions Follow Up Call    Patient Current Location:  Home: [address] [place] [id number]    Care Transition Nurse contacted the patient by telephone to follow up after admission.  Verified name and  with patient as identifiers.    Patient: Linn Aguayo  Patient : 1950   MRN: [id number]  Reason for Admission:   Discharge Date: 24 RARS: Readmission Risk Score: 5.2      Needs to be reviewed by the provider   Additional needs identified to be addressed with provider: No  none             Method of communication with provider: none.    Pt states doing well, no issues or concerns.Sees cardiology today. Agreed to more CTC f/u calls.      Addressed changes since last contact:  none  Follow Up  Future Appointments   Date Time Provider Department Center   2024  1:30 PM Karina Beaver, APRN - CNP  Cardio MMA   2024 11:40 AM Lyssa Barr DO [facility] [place] - D[facility]   2024  1:30 PM Paul Cuello MD  Cardio MMA         Care Transition Nurse reviewed medical action plan with patient and discussed any barriers to care and/or understanding of plan of care after discharge. Discussed appropriate site of care based on symptoms and resources available to patient including: When to call 911. The patient agrees to contact the PCP office for questions related to their healthcare.     Advance Care Planning:   not on file.     Offered patient enrollment in the Remote Patient Monitoring (RPM) program for in-home monitoring: Yes, but did not enroll at this time: controlled chronic disease management.     Care Transitions Subsequent and Final Call    Subsequent and Final Calls  Do you have any ongoing symptoms?: No  Have your medications changed?: No  Do you have any questions related to your medications?: No  Do you currently have any active services?: No  Do you have any needs or concerns that I can assist you with?: No  Identified Barriers: None  Care Transitions

## 2024-06-01 ENCOUNTER — HOSPITAL ENCOUNTER (EMERGENCY)
Age: 74
Discharge: HOME | End: 2024-06-01
Attending: EMERGENCY MEDICINE
Payer: MEDICARE

## 2024-06-01 ENCOUNTER — APPOINTMENT (OUTPATIENT)
Dept: CT IMAGING | Age: 74
End: 2024-06-01
Payer: MEDICARE

## 2024-06-01 ENCOUNTER — APPOINTMENT (OUTPATIENT)
Dept: GENERAL RADIOLOGY | Age: 74
End: 2024-06-01
Payer: MEDICARE

## 2024-06-01 VITALS
HEART RATE: 74 BPM | DIASTOLIC BLOOD PRESSURE: 76 MMHG | SYSTOLIC BLOOD PRESSURE: 157 MMHG | WEIGHT: 140 LBS | HEIGHT: 64 IN | BODY MASS INDEX: 23.9 KG/M2 | RESPIRATION RATE: 18 BRPM | OXYGEN SATURATION: 99 % | TEMPERATURE: 98.5 F

## 2024-06-01 DIAGNOSIS — R07.9 NONSPECIFIC CHEST PAIN: ICD-10-CM

## 2024-06-01 DIAGNOSIS — R10.13 ACUTE EPIGASTRIC PAIN: Primary | ICD-10-CM

## 2024-06-01 LAB
ALBUMIN SERPL-MCNC: 5 G/DL (ref 3.4–5)
ALBUMIN/GLOB SERPL: 1.5 {RATIO} (ref 1.1–2.2)
ALP SERPL-CCNC: 98 U/L (ref 40–129)
ALT SERPL-CCNC: 13 U/L (ref 10–40)
ANION GAP SERPL CALCULATED.3IONS-SCNC: 16 MMOL/L (ref 3–16)
AST SERPL-CCNC: 15 U/L (ref 15–37)
BACTERIA URNS QL MICRO: NORMAL /HPF
BASOPHILS # BLD: 0.2 K/UL (ref 0–0.2)
BASOPHILS NFR BLD: 2.7 %
BILIRUB SERPL-MCNC: 0.4 MG/DL (ref 0–1)
BILIRUB UR QL STRIP.AUTO: NEGATIVE
BUN SERPL-MCNC: 12 MG/DL (ref 7–20)
CALCIUM SERPL-MCNC: 10 MG/DL (ref 8.3–10.6)
CHLORIDE SERPL-SCNC: 100 MMOL/L (ref 99–110)
CLARITY UR: CLEAR
CO2 SERPL-SCNC: 23 MMOL/L (ref 21–32)
COLOR UR: YELLOW
CREAT SERPL-MCNC: <0.5 MG/DL (ref 0.6–1.2)
D-DIMER QUANTITATIVE: 1.04 UG/ML FEU (ref 0–0.6)
DEPRECATED RDW RBC AUTO: 12.7 % (ref 12.4–15.4)
EOSINOPHIL # BLD: 0 K/UL (ref 0–0.6)
EOSINOPHIL NFR BLD: 0.6 %
EPI CELLS #/AREA URNS AUTO: 1 /HPF (ref 0–5)
GFR SERPLBLD CREATININE-BSD FMLA CKD-EPI: >90 ML/MIN/{1.73_M2}
GLUCOSE SERPL-MCNC: 155 MG/DL (ref 70–99)
GLUCOSE UR STRIP.AUTO-MCNC: NEGATIVE MG/DL
HCT VFR BLD AUTO: 42.4 % (ref 36–48)
HGB BLD-MCNC: 14.1 G/DL (ref 12–16)
HGB UR QL STRIP.AUTO: ABNORMAL
HYALINE CASTS #/AREA URNS AUTO: 0 /LPF (ref 0–8)
KETONES UR STRIP.AUTO-MCNC: NEGATIVE MG/DL
LEUKOCYTE ESTERASE UR QL STRIP.AUTO: ABNORMAL
LIPASE SERPL-CCNC: 25 U/L (ref 13–60)
LYMPHOCYTES # BLD: 1 K/UL (ref 1–5.1)
LYMPHOCYTES NFR BLD: 14.2 %
MCH RBC QN AUTO: 30.3 PG (ref 26–34)
MCHC RBC AUTO-ENTMCNC: 33.2 G/DL (ref 31–36)
MCV RBC AUTO: 91.2 FL (ref 80–100)
MONOCYTES # BLD: 0.3 K/UL (ref 0–1.3)
MONOCYTES NFR BLD: 4.7 %
NEUTROPHILS # BLD: 5.3 K/UL (ref 1.7–7.7)
NEUTROPHILS NFR BLD: 77.8 %
NITRITE UR QL STRIP.AUTO: NEGATIVE
PH UR STRIP.AUTO: 6.5 [PH] (ref 5–8)
PLATELET # BLD AUTO: 270 K/UL (ref 135–450)
PMV BLD AUTO: 8.4 FL (ref 5–10.5)
POTASSIUM SERPL-SCNC: 4.2 MMOL/L (ref 3.5–5.1)
PROT SERPL-MCNC: 8.4 G/DL (ref 6.4–8.2)
PROT UR STRIP.AUTO-MCNC: NEGATIVE MG/DL
RBC # BLD AUTO: 4.65 M/UL (ref 4–5.2)
RBC CLUMPS #/AREA URNS AUTO: 3 /HPF (ref 0–4)
SODIUM SERPL-SCNC: 139 MMOL/L (ref 136–145)
SP GR UR STRIP.AUTO: <=1.005 (ref 1–1.03)
TROPONIN, HIGH SENSITIVITY: 11 NG/L (ref 0–14)
UA COMPLETE W REFLEX CULTURE PNL UR: ABNORMAL
UA DIPSTICK W REFLEX MICRO PNL UR: YES
URN SPEC COLLECT METH UR: ABNORMAL
UROBILINOGEN UR STRIP-ACNC: 0.2 E.U./DL
WBC # BLD AUTO: 6.8 K/UL (ref 4–11)
WBC #/AREA URNS AUTO: 5 /HPF (ref 0–5)

## 2024-06-01 PROCEDURE — 6370000000 HC RX 637 (ALT 250 FOR IP): Performed by: EMERGENCY MEDICINE

## 2024-06-01 PROCEDURE — 93005 ELECTROCARDIOGRAM TRACING: CPT | Performed by: EMERGENCY MEDICINE

## 2024-06-01 PROCEDURE — 36415 COLL VENOUS BLD VENIPUNCTURE: CPT

## 2024-06-01 PROCEDURE — 85025 COMPLETE CBC W/AUTO DIFF WBC: CPT

## 2024-06-01 PROCEDURE — 6360000004 HC RX CONTRAST MEDICATION: Performed by: EMERGENCY MEDICINE

## 2024-06-01 PROCEDURE — 71260 CT THORAX DX C+: CPT

## 2024-06-01 PROCEDURE — 83690 ASSAY OF LIPASE: CPT

## 2024-06-01 PROCEDURE — 85379 FIBRIN DEGRADATION QUANT: CPT

## 2024-06-01 PROCEDURE — 99285 EMERGENCY DEPT VISIT HI MDM: CPT

## 2024-06-01 PROCEDURE — 80053 COMPREHEN METABOLIC PANEL: CPT

## 2024-06-01 PROCEDURE — 81001 URINALYSIS AUTO W/SCOPE: CPT

## 2024-06-01 PROCEDURE — 84484 ASSAY OF TROPONIN QUANT: CPT

## 2024-06-01 PROCEDURE — 71046 X-RAY EXAM CHEST 2 VIEWS: CPT

## 2024-06-01 RX ORDER — FAMOTIDINE 20 MG/1
20 TABLET, FILM COATED ORAL 2 TIMES DAILY
Qty: 30 TABLET | Refills: 0 | Status: SHIPPED | OUTPATIENT
Start: 2024-06-01 | End: 2024-06-16

## 2024-06-01 RX ORDER — ASPIRIN 81 MG/1
243 TABLET, CHEWABLE ORAL ONCE
Status: COMPLETED | OUTPATIENT
Start: 2024-06-01 | End: 2024-06-01

## 2024-06-01 RX ORDER — HYDROCODONE BITARTRATE AND ACETAMINOPHEN 5; 325 MG/1; MG/1
1 TABLET ORAL EVERY 6 HOURS PRN
Qty: 8 TABLET | Refills: 0 | Status: SHIPPED | OUTPATIENT
Start: 2024-06-01 | End: 2024-06-04

## 2024-06-01 RX ORDER — FAMOTIDINE 20 MG/1
20 TABLET, FILM COATED ORAL ONCE
Status: COMPLETED | OUTPATIENT
Start: 2024-06-01 | End: 2024-06-01

## 2024-06-01 RX ADMIN — ASPIRIN 243 MG: 81 TABLET, CHEWABLE ORAL at 16:27

## 2024-06-01 RX ADMIN — IOPAMIDOL 75 ML: 755 INJECTION, SOLUTION INTRAVENOUS at 19:44

## 2024-06-01 RX ADMIN — FAMOTIDINE 20 MG: 20 TABLET, FILM COATED ORAL at 16:27

## 2024-06-01 ASSESSMENT — PAIN DESCRIPTION - ORIENTATION: ORIENTATION: LEFT;RIGHT

## 2024-06-01 ASSESSMENT — PAIN DESCRIPTION - FREQUENCY: FREQUENCY: INTERMITTENT

## 2024-06-01 ASSESSMENT — PAIN DESCRIPTION - PAIN TYPE: TYPE: ACUTE PAIN

## 2024-06-01 ASSESSMENT — PAIN - FUNCTIONAL ASSESSMENT
PAIN_FUNCTIONAL_ASSESSMENT: ACTIVITIES ARE NOT PREVENTED
PAIN_FUNCTIONAL_ASSESSMENT: 0-10

## 2024-06-01 ASSESSMENT — PAIN SCALES - GENERAL: PAINLEVEL_OUTOF10: 3

## 2024-06-01 ASSESSMENT — PAIN DESCRIPTION - DESCRIPTORS: DESCRIPTORS: ACHING

## 2024-06-01 ASSESSMENT — PAIN DESCRIPTION - LOCATION: LOCATION: ABDOMEN;ARM;BACK

## 2024-06-01 NOTE — ED NOTES
Pt to ED from home, with  and daughter at bedside, reporting onset of bilateral shoulder pain, lower abdominal pain, and radiation of pain from center of back \"through\" the center. Pt. States pain is dull but has worsened and was told after recent stents (5/10/24 Dr. Osullivan). Pt with new medications post stents and is unsure if these are related to symptoms. Pt was told to go to ED for any CP post stenting therefore came straight here after taking Nitro, which she reports may have helped pain slightly.\" Pt VSS. EKG completed in triage and given directly to ED.

## 2024-06-02 LAB
EKG ATRIAL RATE: 85 BPM
EKG DIAGNOSIS: NORMAL
EKG P AXIS: 46 DEGREES
EKG P-R INTERVAL: 120 MS
EKG Q-T INTERVAL: 378 MS
EKG QRS DURATION: 86 MS
EKG QTC CALCULATION (BAZETT): 449 MS
EKG R AXIS: -44 DEGREES
EKG T AXIS: -2 DEGREES
EKG VENTRICULAR RATE: 85 BPM

## 2024-06-02 PROCEDURE — 93010 ELECTROCARDIOGRAM REPORT: CPT | Performed by: INTERNAL MEDICINE

## 2024-06-02 NOTE — ED PROVIDER NOTES
EMERGENCY DEPARTMENT PROVIDER NOTE    Patient Identification  Pt Name: Linn Aguayo  MRN: [id number]  Birthdate 1950  Date of evaluation: 6/1/2024  Provider: Monroe Leos DO  PCP: Lyssa Barr DO    Chief Complaint  Abdominal Pain (Pt presents to the hospital due to having pain in her upper abdomen that radiates to her left arm and down her back.  States that pain has been intermittent, hx of MI in the past, stents in place, denies SOB/N/V.  Took one nitro today and one baby aspirin today.  States that the nitro has not really helped with her pain./)      HPI  (History provided by patient and daughter)  This is a 74 y.o. female with pertinent past medical history of coronary artery disease, hypertension, diabetes, GERD who was brought in by family for upper abdominal pain.  Patient reports pain began about 8 hours prior to arrival.  Felt as an aching in her epigastrium that radiated into her back, also felt aching pain in both of her shoulders.  Nothing clearly seem to make the symptoms any better or worse.  Took a sublingual nitroglycerin prior to arrival which did not change her pain.  She denies any fevers, chills, shortness of breath or  symptoms.  Was hospitalized for NSTEMI at this facility on 5/10/2024, underwent cardiac catheterization on 5/13/2024 with PCI of RCA.  She reports complete adherence to her daily aspirin and Plavix.      I have reviewed the following nursing documentation:  Allergies: Doxycycline; Asa arthritis strength-antacid [aspirin buff, al hyd-mg hyd]; Aspirin; Atorvastatin; Erythromycin base; Macrolides and ketolides; Troleandomycin; Verapamil; and Sulfa antibiotics    Past medical history:   Past Medical History:   Diagnosis Date    Allergic rhinitis     Fibromyalgia     GERD (gastroesophageal reflux disease)     Herniated disc     Hyperlipidemia     Hypertension     MI 04/20/2018    OM1 2.5 x 18 manasa    Type II or unspecified type diabetes mellitus without

## 2024-06-02 NOTE — ED NOTES
Pt discharged to home. 2 prescriptions sent to pharmacy, education provided. Will follow up as directed. Verbalized understanding of AVS. Pt awake, alert and oriented. Respirations even and unlabored on room air.

## 2024-06-06 ENCOUNTER — CARE COORDINATION (OUTPATIENT)
Dept: CASE MANAGEMENT | Age: 74
End: 2024-06-06

## 2024-06-10 RX ORDER — GLIPIZIDE 2.5 MG/1
2.5 TABLET, EXTENDED RELEASE ORAL 2 TIMES DAILY WITH MEALS
Qty: 180 TABLET | Refills: 1 | Status: SHIPPED | OUTPATIENT
Start: 2024-06-10

## 2024-06-11 ENCOUNTER — TELEPHONE (OUTPATIENT)
Dept: CARDIOLOGY CLINIC | Age: 74
End: 2024-06-11

## 2024-06-11 RX ORDER — LISINOPRIL 20 MG/1
20 TABLET ORAL 2 TIMES DAILY
Qty: 180 TABLET | Refills: 3 | Status: SHIPPED | OUTPATIENT
Start: 2024-06-11

## 2024-06-11 RX ORDER — METFORMIN HYDROCHLORIDE 500 MG/1
TABLET, EXTENDED RELEASE ORAL
Qty: 270 TABLET | Refills: 3 | Status: SHIPPED | OUTPATIENT
Start: 2024-06-11

## 2024-06-11 NOTE — TELEPHONE ENCOUNTER
Pt states she was told after her stent done by bgx on 5/10, she needed to see les sooner than her 8/27 office visit. Please advise.

## 2024-06-13 ENCOUNTER — CARE COORDINATION (OUTPATIENT)
Dept: CASE MANAGEMENT | Age: 74
End: 2024-06-13

## 2024-06-13 NOTE — CARE COORDINATION
Care Transitions Outreach Attempt    Per [facility], pt has a PCP appt today, this nurse will follow up at a later date.      Patient: Linn Aguayo Patient : 1950 MRN: [id number]    Last Discharge Facility       Date Complaint Diagnosis Description Type Department Provider    24 Abdominal Pain Acute epigastric pain ... ED (DISCHARGE) [facility] Monroe Leos DO            Noted following upcoming appointments from discharge chart review:   [facility] follow up appointment(s):   Future Appointments   Date Time Provider Department Center   2024 11:40 AM Lyssa Barr DO [facility] [place] [facility]   2024  1:30 PM Paul Cuello MD FF Cardio MMA     Non-[facility]  follow up appointment(s):

## 2024-06-14 ENCOUNTER — CARE COORDINATION (OUTPATIENT)
Dept: CASE MANAGEMENT | Age: 74
End: 2024-06-14

## 2024-06-14 NOTE — CARE COORDINATION
Care Transitions Note  Follow Up Call     Attempted to reach patient for transitions of care follow up.  Unable to reach patient.      Outreach Attempts:   Unable to leave message.     Care Summary Note:     Follow Up Appointment:   Future Appointments         Provider Specialty Dept Phone    6/17/2024 1:45 PM Cassy Garcia APRN - CNP Cardiology [phone]    8/27/2024 1:30 PM Paul Cuello MD Cardiology [phone]            Plan for follow-up call in 2-5 days based on severity of symptoms and risk factors. Plan for next call: self management-     Gallo Morales RN

## 2024-06-17 ENCOUNTER — OFFICE VISIT (OUTPATIENT)
Dept: CARDIOLOGY CLINIC | Age: 74
End: 2024-06-17
Payer: MEDICARE

## 2024-06-17 VITALS
OXYGEN SATURATION: 97 % | DIASTOLIC BLOOD PRESSURE: 60 MMHG | WEIGHT: 138.4 LBS | BODY MASS INDEX: 23.63 KG/M2 | HEART RATE: 78 BPM | HEIGHT: 64 IN | SYSTOLIC BLOOD PRESSURE: 142 MMHG

## 2024-06-17 DIAGNOSIS — I25.10 CORONARY ARTERY DISEASE INVOLVING NATIVE CORONARY ARTERY OF NATIVE HEART WITHOUT ANGINA PECTORIS: Primary | ICD-10-CM

## 2024-06-17 DIAGNOSIS — I10 PRIMARY HYPERTENSION: ICD-10-CM

## 2024-06-17 DIAGNOSIS — E78.5 HYPERLIPIDEMIA LDL GOAL <70: ICD-10-CM

## 2024-06-17 PROCEDURE — 1124F ACP DISCUSS-NO DSCNMKR DOCD: CPT | Performed by: NURSE PRACTITIONER

## 2024-06-17 PROCEDURE — 1090F PRES/ABSN URINE INCON ASSESS: CPT | Performed by: NURSE PRACTITIONER

## 2024-06-17 PROCEDURE — G8399 PT W/DXA RESULTS DOCUMENT: HCPCS | Performed by: NURSE PRACTITIONER

## 2024-06-17 PROCEDURE — 3078F DIAST BP <80 MM HG: CPT | Performed by: NURSE PRACTITIONER

## 2024-06-17 PROCEDURE — 3017F COLORECTAL CA SCREEN DOC REV: CPT | Performed by: NURSE PRACTITIONER

## 2024-06-17 PROCEDURE — G8420 CALC BMI NORM PARAMETERS: HCPCS | Performed by: NURSE PRACTITIONER

## 2024-06-17 PROCEDURE — 1036F TOBACCO NON-USER: CPT | Performed by: NURSE PRACTITIONER

## 2024-06-17 PROCEDURE — G8427 DOCREV CUR MEDS BY ELIG CLIN: HCPCS | Performed by: NURSE PRACTITIONER

## 2024-06-17 PROCEDURE — 3077F SYST BP >= 140 MM HG: CPT | Performed by: NURSE PRACTITIONER

## 2024-06-17 PROCEDURE — 99214 OFFICE O/P EST MOD 30 MIN: CPT | Performed by: NURSE PRACTITIONER

## 2024-06-17 NOTE — PROGRESS NOTES
[facility]     Outpatient Follow Up Note    CHIEF COMPLAINT / HPI: Hospital Follow Up secondary to CAD    Hospital Course progressed as follows per discharge summary: 5/10 - 5/14/24    74 y.o. female with pmh of HTN, HLD, CAD with posterior MI 4/2018  ~presents with epigastric x 3 days.  Pain similar to previous MI, pressure-like, intermittent radiating up into the chest more of a pressure sensation it did start radiating up into her chest.    In the ED EKG showed no acute ischemic changes but hs-troponin was elevated 138.  NSTEMI:   Peak hs-troponin 167.    Cardiology consulted: s/p Successful PCI to distal RCA 5/13/24  Continue Plavix, ASA, Statin  Cleared by cardiologist for discharge.  Out patient follow up with Dr. Osullivan in 2 weeks.    Interval hx: 6/1/24: ER  reporting onset of bilateral shoulder pain, lower abdominal pain, and radiation of pain from center of back \"through\" the center. Pt. States pain is dull but has worsened and was told after recent stents (5/10/24 Dr. Osullivan). Pt with new medications post stents and is unsure if these are related to symptoms. Pt was told to go to ED for any CP post stenting therefore came straight here after taking Nitro, which she reports may have helped pain slightly   ~EKG without evidence of acute ischemia, troponin normal, ACS is not immediately eviden     ~Suspect pain more likely GI or musculoskeletal than cardiac in etiology, however given known CAD and recent coronary stenting I discussed case with Dr. Cooper for cardiology.  Patient reports complete adherence to her dual antiplatelet therapy, per cardiology no anticipation for any in-stent restenosis at this early interval and okay for discharge if EKG and troponin testing reassuring.  On my reevaluation of patient she reported her pain was much improved with ED treatment      Linn Aguayo is 74 y.o. female who presents today for a routine follow up after a recent hospitalization related to

## 2024-06-17 NOTE — PATIENT INSTRUCTIONS
Try : Protonix OTC instead of pepcid    Call me after 3-4 weeks and let me know how you're feeling     Check your BP once a week    Keep appt with Dr. Cuello

## 2024-06-18 ENCOUNTER — CARE COORDINATION (OUTPATIENT)
Dept: CASE MANAGEMENT | Age: 74
End: 2024-06-18

## 2024-06-18 NOTE — CARE COORDINATION
Care Transitions Note  Final Call     Patient Current Location:  Home: [address] [place] [id number]    Care Transition Nurse contacted the patient by telephone. Verified name and  as identifiers.    Patient graduated from the Care Transitions program on 24.  Patient/family verbalizes confidence in the ability to self-manage at this time..      Advance Care Planning:   Does patient have an Advance Directive: reviewed during previous call, see note. .    Handoff:   Patient was not referred to the ACM team due to no additional needs identified.       Care Summary Note: Pt states doing well, no issues or concerns.No need for further f/u CTC calls.      Assessments:  Care Transitions Subsequent and Final Call    Subsequent and Final Calls  Do you have any ongoing symptoms?: No  Have your medications changed?: No  Do you have any questions related to your medications?: No  Do you currently have any active services?: No  Do you have any needs or concerns that I can assist you with?: No  Identified Barriers: None  Care Transitions Interventions  No Identified Needs  Other Interventions:              Upcoming Appointments:    Future Appointments         Provider Specialty Dept Phone    2024 1:30 PM Magdi Osullivan MD Cardiology [phone]    2024 1:30 PM Paul Cuello MD Cardiology [phone]            Patient has agreed to contact primary care provider and/or specialist for any further questions, concerns, or needs.    Gallo Morales RN

## 2024-06-21 ENCOUNTER — CARE COORDINATION (OUTPATIENT)
Dept: CARE COORDINATION | Age: 74
End: 2024-06-21

## 2024-06-21 NOTE — CARE COORDINATION
Ambulatory Care Coordination Note  2024    Patient Current Location:  [facility] contacted the patient by telephone. Verified name and  with patient as identifiers. Provided introduction to self, and explanation of the ACM role.     ACM: Rhiannon French RN    Challenges to be reviewed by the provider   Additional needs identified to be addressed with provider: Yes  Follow up appointment with Dr. Barr               Method of communication with provider: phone. Spoke with Daya Martins    Payor referral to care coordination. ACM outreached patient; introduced self and role of care coordinator.   Patient hospitalized 5/10- for chest pain. Patient discharged from Care Transitions on 24.  Patient states she is doing well and declines immediate needs at this time. Patient states she was prescribed Jardiance and is concerned it will be unaffordable. Patient states she had to stop taking it a few years ago because her copay was $300 after insurance covered their portion. Patient states she intends to call [employer] to discuss coverage. Patient requesting call return from [facility] after she talks to [facility] to determine needs.     Patient states she showed for her hospital follow up appointment with Dr. Barr on . After waiting for an hour, patient states she cancelled her appointment and left.  Patient questioning if she should reschedule her appointment. Message out to Melissa Martins to discuss.     Offered patient enrollment in the Remote Patient Monitoring (RPM) program for in-home monitoring:Did not discuss during call    Ambulatory Care Coordination Assessment    Care Coordination Protocol  Week 1 - Initial Assessment     Do you have all of your prescriptions and are they filled?: Yes  Are you able to afford your medications?: Yes  How often do you have trouble taking your medications the way you have been told to take them?: I always take them as prescribed.           Current Housing: Private

## 2024-07-01 PROBLEM — I25.83 CORONARY ARTERY DISEASE DUE TO LIPID RICH PLAQUE: Status: ACTIVE | Noted: 2018-04-20

## 2024-07-01 NOTE — PATIENT INSTRUCTIONS
Thank you for coming to see me today     Referral sent for cardiac rehab    Continue taking pepcid (famotidine) while you are on both aspirin and plavix

## 2024-07-01 NOTE — PROGRESS NOTES
[facility] HEART [place]    7/2/2024    Referring Provider: Lyssa Barr DO    HISTORY:  Linn Aguayo is a 74 y.o. female presenting as a follow up to discuss her [facility] results. She was admitted to the hospital with an NSTEMI 5/2024 with complaints of epigastric pain. She was taken to the cath lab and had successful PCI to distal RCA. She returned to the emergency room on 6/1/2024 with complaints of epigastric pain, dull pain in her shoulders and lower abdomen radiating to her back. The pain has occurred after she has eaten. She also has HTN, HLD and palpitations on diltiazem.     Today she arrives with her  and is ambulatory. She reports pains similar to the pain she was experiencing prior to her stent placement. It was so bad that she went to the emergency room on 6/1/2024. Her troponin was negative at that time with no EKG changes. She was started on Pepcid and reports that the pain has resolved. She does still have some occasional pain to her back and shoulders but nothing in the center of her chest. She has not started cardiac rehab. Although she feels much better, she is concerned about having another event in the future. Compliant with Rx.     REVIEW OF SYSTEMS:  A complete review of systems was reviewed and is negative except as noted in the history of present illness.    Prior to Visit Medications    Medication Sig Taking? Authorizing Provider   famotidine (PEPCID) 20 MG tablet Take 1 tablet by mouth 2 times daily Yes Magdi Osullivan MD   clopidogrel (PLAVIX) 75 MG tablet Take 1 tablet by mouth daily Yes Magdi Osullivan MD   metFORMIN (GLUCOPHAGE-XR) 500 MG extended release tablet TAKE THREE TABLETS BY MOUTH DAILY WITH BREAKFAST  Patient taking differently: 1 tablet in am, 2 tablets in pm Yes Lyssa Barr DO   lisinopril (PRINIVIL;ZESTRIL) 20 MG tablet TAKE ONE TABLET BY MOUTH TWICE A DAY Yes Lyssa Barr DO   glipiZIDE (GLUCOTROL XL) 2.5 MG extended release tablet TAKE ONE

## 2024-07-02 ENCOUNTER — OFFICE VISIT (OUTPATIENT)
Dept: CARDIOLOGY CLINIC | Age: 74
End: 2024-07-02
Payer: MEDICARE

## 2024-07-02 VITALS
BODY MASS INDEX: 23.56 KG/M2 | WEIGHT: 138 LBS | OXYGEN SATURATION: 96 % | HEART RATE: 72 BPM | DIASTOLIC BLOOD PRESSURE: 64 MMHG | HEIGHT: 64 IN | SYSTOLIC BLOOD PRESSURE: 132 MMHG

## 2024-07-02 DIAGNOSIS — I10 PRIMARY HYPERTENSION: ICD-10-CM

## 2024-07-02 DIAGNOSIS — I25.10 CORONARY ARTERY DISEASE DUE TO LIPID RICH PLAQUE: Primary | ICD-10-CM

## 2024-07-02 DIAGNOSIS — E11.65 TYPE 2 DIABETES MELLITUS WITH HYPERGLYCEMIA, WITHOUT LONG-TERM CURRENT USE OF INSULIN (HCC): ICD-10-CM

## 2024-07-02 DIAGNOSIS — R00.2 PALPITATION: ICD-10-CM

## 2024-07-02 DIAGNOSIS — I25.83 CORONARY ARTERY DISEASE DUE TO LIPID RICH PLAQUE: Primary | ICD-10-CM

## 2024-07-02 DIAGNOSIS — K21.9 GASTROESOPHAGEAL REFLUX DISEASE, UNSPECIFIED WHETHER ESOPHAGITIS PRESENT: ICD-10-CM

## 2024-07-02 DIAGNOSIS — E78.5 HYPERLIPIDEMIA LDL GOAL <70: ICD-10-CM

## 2024-07-02 PROCEDURE — 2022F DILAT RTA XM EVC RTNOPTHY: CPT | Performed by: STUDENT IN AN ORGANIZED HEALTH CARE EDUCATION/TRAINING PROGRAM

## 2024-07-02 PROCEDURE — 3051F HG A1C>EQUAL 7.0%<8.0%: CPT | Performed by: STUDENT IN AN ORGANIZED HEALTH CARE EDUCATION/TRAINING PROGRAM

## 2024-07-02 PROCEDURE — 1036F TOBACCO NON-USER: CPT | Performed by: STUDENT IN AN ORGANIZED HEALTH CARE EDUCATION/TRAINING PROGRAM

## 2024-07-02 PROCEDURE — G8399 PT W/DXA RESULTS DOCUMENT: HCPCS | Performed by: STUDENT IN AN ORGANIZED HEALTH CARE EDUCATION/TRAINING PROGRAM

## 2024-07-02 PROCEDURE — 99214 OFFICE O/P EST MOD 30 MIN: CPT | Performed by: STUDENT IN AN ORGANIZED HEALTH CARE EDUCATION/TRAINING PROGRAM

## 2024-07-02 PROCEDURE — 1124F ACP DISCUSS-NO DSCNMKR DOCD: CPT | Performed by: STUDENT IN AN ORGANIZED HEALTH CARE EDUCATION/TRAINING PROGRAM

## 2024-07-02 PROCEDURE — 3078F DIAST BP <80 MM HG: CPT | Performed by: STUDENT IN AN ORGANIZED HEALTH CARE EDUCATION/TRAINING PROGRAM

## 2024-07-02 PROCEDURE — 1090F PRES/ABSN URINE INCON ASSESS: CPT | Performed by: STUDENT IN AN ORGANIZED HEALTH CARE EDUCATION/TRAINING PROGRAM

## 2024-07-02 PROCEDURE — 3075F SYST BP GE 130 - 139MM HG: CPT | Performed by: STUDENT IN AN ORGANIZED HEALTH CARE EDUCATION/TRAINING PROGRAM

## 2024-07-02 PROCEDURE — G8427 DOCREV CUR MEDS BY ELIG CLIN: HCPCS | Performed by: STUDENT IN AN ORGANIZED HEALTH CARE EDUCATION/TRAINING PROGRAM

## 2024-07-02 PROCEDURE — 3017F COLORECTAL CA SCREEN DOC REV: CPT | Performed by: STUDENT IN AN ORGANIZED HEALTH CARE EDUCATION/TRAINING PROGRAM

## 2024-07-02 PROCEDURE — G8420 CALC BMI NORM PARAMETERS: HCPCS | Performed by: STUDENT IN AN ORGANIZED HEALTH CARE EDUCATION/TRAINING PROGRAM

## 2024-07-02 RX ORDER — FAMOTIDINE 20 MG/1
20 TABLET, FILM COATED ORAL 2 TIMES DAILY
Qty: 180 TABLET | Refills: 3 | Status: SHIPPED | OUTPATIENT
Start: 2024-07-02

## 2024-07-02 RX ORDER — CLOPIDOGREL BISULFATE 75 MG/1
75 TABLET ORAL DAILY
Qty: 90 TABLET | Refills: 3 | Status: SHIPPED | OUTPATIENT
Start: 2024-07-02

## 2024-07-16 ENCOUNTER — CARE COORDINATION (OUTPATIENT)
Dept: CARE COORDINATION | Age: 74
End: 2024-07-16

## 2024-07-16 NOTE — CARE COORDINATION
Ambulatory Care Coordination Note     2024 1:13 PM     Patient Current Location:  [place]     ACM contacted the patient by telephone. Verified name and  with patient as identifiers.         ACM: Rhiannon French RN     Challenges to be reviewed by the provider   Additional needs identified to be addressed with provider No  none               Method of communication with provider: none.    Care Summary Note:     ACM outreached patient for cc follow up. Patient remains resistant to care coordination.     Patient states she has not started Jardiance. Medication prescribed in 2024. Patient has not checked with insurance to determine out of pocket expense. Patient was given samples but states she has not started. ACM informed patient I will outreach [facility] to determine co-pay and follow up with her to discuss cost, pt vu.     ACM attempted to outreach [facility] - closed for lunch.     Offered patient enrollment in the Remote Patient Monitoring (RPM) program for in-home monitoring:      Assessments Completed:   Not completed - patient declined care coordination    Medications Reviewed:   Not completed during this call:      Advance Care Planning:   Not reviewed during this call     Care Planning:   Not completed during this call    PCP/Specialist follow up:   Future Appointments         Provider Specialty Dept Phone    2024 2:40 PM Lyssa Barr DO Internal Medicine [phone]    2024 3:30 PM SCHEDULE, FF IM AWV LPN Internal Medicine [phone]            Follow Up:   Plan for next ACM outreach in approximately 1-2 days  to complete:  - Jardiance co-pay .   Patient  is agreeable to this plan.

## 2024-07-16 NOTE — CARE COORDINATION
ACM outreached [facility]; spoke with representative who confirmed 30 day prescription of Jardiance is $47/month.

## 2024-07-17 ENCOUNTER — TELEPHONE (OUTPATIENT)
Dept: INTERNAL MEDICINE CLINIC | Age: 74
End: 2024-07-17

## 2024-07-17 DIAGNOSIS — E11.65 TYPE 2 DIABETES MELLITUS WITH HYPERGLYCEMIA, WITHOUT LONG-TERM CURRENT USE OF INSULIN (HCC): Primary | ICD-10-CM

## 2024-07-17 NOTE — TELEPHONE ENCOUNTER
Patient came into office, requesting a referral to Dr. China Galarza [address] , phone number [phone]. Please advise

## 2024-07-17 NOTE — TELEPHONE ENCOUNTER
Called patient back--- cardiologist who did her heart stent recommended she see an endocrinologist since she is a diabetic.

## 2024-07-18 ENCOUNTER — CARE COORDINATION (OUTPATIENT)
Dept: CARE COORDINATION | Age: 74
End: 2024-07-18

## 2024-07-18 NOTE — TELEPHONE ENCOUNTER
Referral placed. She will need to call to schedule appointment. Connie Agosto.     Lab Results   Component Value Date    LABA1C 7.8 05/11/2024    LABA1C 8.1 04/15/2024    LABA1C 7.6 12/14/2023     Lab Results   Component Value Date    MALBCR 38.8 (H) 12/14/2023    CREATININE <0.5 (L) 06/01/2024

## 2024-07-18 NOTE — CARE COORDINATION
Ambulatory Care Coordination Note     7/18/2024 3:39 PM     Patient outreach attempt by this ACM today to perform care management follow up . ACM was unable to reach the patient by telephone today; left voice message requesting a return phone call to this ACM.     ACM: Rhiannon French, RN     Care Summary Note:     ACM attempted to outreach patient for cc follow up; Karol co-pay    ACM outreached [facility] on 7/17; spoke with representative who confirmed 30 day prescription of Jardiance is $47/month.     PCP/Specialist follow up:   Future Appointments         Provider Specialty Dept Phone    7/26/2024 2:40 PM Lyssa Barr,  Internal Medicine [phone]    7/26/2024 3:30 PM SCHEDULE, FF IM AWV LPN Internal Medicine [phone]            Follow Up:   Plan for next ACM outreach in approximately 1 week to complete:  Ellie Agosto referral  Needs & concerns.

## 2024-07-22 ENCOUNTER — TELEPHONE (OUTPATIENT)
Dept: ENDOCRINOLOGY | Age: 74
End: 2024-07-22

## 2024-07-22 NOTE — TELEPHONE ENCOUNTER
Called pt to schedule her new pt appt from a referral and she was a previous pt of Dr Kayla Kim back in 2019    Pt states she wants to see Dr Galarza in [place] because it would be closer to her since she lives in [place] as compared to driving to [facility] to switch physicians? From Dr Kim to Dr Galarza?

## 2024-07-26 ENCOUNTER — OFFICE VISIT (OUTPATIENT)
Dept: INTERNAL MEDICINE CLINIC | Age: 74
End: 2024-07-26

## 2024-07-26 VITALS
DIASTOLIC BLOOD PRESSURE: 68 MMHG | HEART RATE: 81 BPM | HEIGHT: 64 IN | WEIGHT: 140 LBS | OXYGEN SATURATION: 97 % | SYSTOLIC BLOOD PRESSURE: 130 MMHG | BODY MASS INDEX: 23.9 KG/M2

## 2024-07-26 DIAGNOSIS — E11.65 TYPE 2 DIABETES MELLITUS WITH HYPERGLYCEMIA, WITHOUT LONG-TERM CURRENT USE OF INSULIN (HCC): ICD-10-CM

## 2024-07-26 DIAGNOSIS — R53.83 FATIGUE, UNSPECIFIED TYPE: Primary | ICD-10-CM

## 2024-07-26 DIAGNOSIS — I25.10 CORONARY ARTERY DISEASE DUE TO LIPID RICH PLAQUE: ICD-10-CM

## 2024-07-26 DIAGNOSIS — I25.2 HISTORY OF MI (MYOCARDIAL INFARCTION): ICD-10-CM

## 2024-07-26 DIAGNOSIS — R53.83 FATIGUE, UNSPECIFIED TYPE: ICD-10-CM

## 2024-07-26 DIAGNOSIS — I10 PRIMARY HYPERTENSION: ICD-10-CM

## 2024-07-26 DIAGNOSIS — I25.83 CORONARY ARTERY DISEASE DUE TO LIPID RICH PLAQUE: ICD-10-CM

## 2024-07-26 LAB
ALBUMIN SERPL-MCNC: 4.5 G/DL (ref 3.4–5)
ALBUMIN/GLOB SERPL: 1.6 {RATIO} (ref 1.1–2.2)
ALP SERPL-CCNC: 103 U/L (ref 40–129)
ALT SERPL-CCNC: 15 U/L (ref 10–40)
ANION GAP SERPL CALCULATED.3IONS-SCNC: 15 MMOL/L (ref 3–16)
AST SERPL-CCNC: 15 U/L (ref 15–37)
BASOPHILS # BLD: 0 K/UL (ref 0–0.2)
BASOPHILS NFR BLD: 0.3 %
BILIRUB SERPL-MCNC: 0.3 MG/DL (ref 0–1)
BUN SERPL-MCNC: 11 MG/DL (ref 7–20)
CALCIUM SERPL-MCNC: 9.4 MG/DL (ref 8.3–10.6)
CHLORIDE SERPL-SCNC: 101 MMOL/L (ref 99–110)
CO2 SERPL-SCNC: 24 MMOL/L (ref 21–32)
CREAT SERPL-MCNC: 0.6 MG/DL (ref 0.6–1.2)
DEPRECATED RDW RBC AUTO: 12.7 % (ref 12.4–15.4)
EOSINOPHIL # BLD: 0.1 K/UL (ref 0–0.6)
EOSINOPHIL NFR BLD: 2.1 %
FOLATE SERPL-MCNC: 14.21 NG/ML (ref 4.78–24.2)
GFR SERPLBLD CREATININE-BSD FMLA CKD-EPI: >90 ML/MIN/{1.73_M2}
GLUCOSE SERPL-MCNC: 130 MG/DL (ref 70–99)
HCT VFR BLD AUTO: 39 % (ref 36–48)
HGB BLD-MCNC: 13.3 G/DL (ref 12–16)
LYMPHOCYTES # BLD: 1.5 K/UL (ref 1–5.1)
LYMPHOCYTES NFR BLD: 27.4 %
MCH RBC QN AUTO: 31.2 PG (ref 26–34)
MCHC RBC AUTO-ENTMCNC: 34 G/DL (ref 31–36)
MCV RBC AUTO: 91.5 FL (ref 80–100)
MONOCYTES # BLD: 0.4 K/UL (ref 0–1.3)
MONOCYTES NFR BLD: 7 %
NEUTROPHILS # BLD: 3.4 K/UL (ref 1.7–7.7)
NEUTROPHILS NFR BLD: 63.2 %
PLATELET # BLD AUTO: 209 K/UL (ref 135–450)
PMV BLD AUTO: 9 FL (ref 5–10.5)
POTASSIUM SERPL-SCNC: 4.1 MMOL/L (ref 3.5–5.1)
PROT SERPL-MCNC: 7.4 G/DL (ref 6.4–8.2)
RBC # BLD AUTO: 4.27 M/UL (ref 4–5.2)
SODIUM SERPL-SCNC: 140 MMOL/L (ref 136–145)
TSH SERPL DL<=0.005 MIU/L-ACNC: 1.05 UIU/ML (ref 0.27–4.2)
VIT B12 SERPL-MCNC: 238 PG/ML (ref 211–911)
WBC # BLD AUTO: 5.3 K/UL (ref 4–11)

## 2024-07-26 NOTE — PROGRESS NOTES
mouth daily 90 tablet 3    empagliflozin (JARDIANCE) 25 MG tablet Take 1 tablet by mouth daily 30 tablet 5    ACCU-CHEK GUIDE strip TEST BLOOD SUGAR ONCE DAILY AND AS NEEDED FOR SYMPTOMS OF IRREGULAR BLOOD GLUCOSE 100 strip 3    VITAMIN D PO Take by mouth      aspirin EC 81 MG EC tablet Take 1 tablet by mouth daily 90 tablet 1    vitamin B-12 (CYANOCOBALAMIN) 1000 MCG tablet Take 1 tablet by mouth daily      Multiple Vitamins-Minerals (CENTRUM SILVER) TABS Take by mouth       No facility-administered medications prior to visit.       Patient'spast medical history, surgical history, family history, medications,  and allergies  were all reviewed and updated as appropriate today.    Review of Systems    /68 (Site: Left Upper Arm, Position: Sitting, Cuff Size: Medium Adult)   Pulse 81   Ht 1.626 m (5' 4\")   Wt 63.5 kg (140 lb)   SpO2 97%   BMI 24.03 kg/m²   Physical Exam  Vitals and nursing note reviewed.   Constitutional:       Appearance: She is well-developed. She is not toxic-appearing.   HENT:      Head: Normocephalic.      Right Ear: Tympanic membrane, ear canal and external ear normal.      Left Ear: Tympanic membrane, ear canal and external ear normal.      Mouth/Throat:      Pharynx: No oropharyngeal exudate or posterior oropharyngeal erythema.   Eyes:      General: No scleral icterus.     Extraocular Movements: Extraocular movements intact.      Conjunctiva/sclera: Conjunctivae normal.      Pupils: Pupils are equal, round, and reactive to light.   Neck:      Thyroid: No thyroid mass or thyromegaly.      Vascular: No carotid bruit.   Cardiovascular:      Rate and Rhythm: Normal rate and regular rhythm.      Heart sounds: Normal heart sounds. No murmur heard.  Pulmonary:      Effort: Pulmonary effort is normal.      Breath sounds: Normal breath sounds.   Musculoskeletal:      Right lower leg: No edema.      Left lower leg: No edema.   Lymphadenopathy:      Cervical: No cervical adenopathy.

## 2024-07-26 NOTE — PATIENT INSTRUCTIONS
Ozempic and Trulicity are weekly shots that are used to treat diabetes. They in several ways, but one of the ways they work is by slowing digestion. As a result, side effects  include nausea, vomiting, and constipation. To help with constipation, I recommend taking Miralax 1 capful daily. The nausea and vomiting sometimes get better the longer you are on the injection but not always. Rare complications include bowel blockages, which is why you need to make sure that your bowels are moving using Miralax, and pancreatitis, which is a very painful inflammation of your pancreas in the left upper side of your abdomen. Both of these conditions can be life threatening.     If you have a history of pancreatitis or a personal or family history of medullary thyroid cancer or MEN syndrome then you cannot take these medications.

## 2024-07-28 PROBLEM — R53.83 FATIGUE: Status: ACTIVE | Noted: 2024-07-28

## 2024-08-12 ENCOUNTER — OFFICE VISIT (OUTPATIENT)
Dept: INTERNAL MEDICINE CLINIC | Age: 74
End: 2024-08-12

## 2024-08-12 VITALS
HEART RATE: 75 BPM | WEIGHT: 140 LBS | BODY MASS INDEX: 23.9 KG/M2 | DIASTOLIC BLOOD PRESSURE: 76 MMHG | OXYGEN SATURATION: 96 % | HEIGHT: 64 IN | SYSTOLIC BLOOD PRESSURE: 140 MMHG

## 2024-08-12 DIAGNOSIS — I25.10 CORONARY ARTERY DISEASE DUE TO LIPID RICH PLAQUE: ICD-10-CM

## 2024-08-12 DIAGNOSIS — I25.2 HISTORY OF MI (MYOCARDIAL INFARCTION): ICD-10-CM

## 2024-08-12 DIAGNOSIS — Z74.09 DECREASED STRENGTH, ENDURANCE, AND MOBILITY: Primary | ICD-10-CM

## 2024-08-12 DIAGNOSIS — I10 PRIMARY HYPERTENSION: ICD-10-CM

## 2024-08-12 DIAGNOSIS — R68.89 DECREASED STRENGTH, ENDURANCE, AND MOBILITY: Primary | ICD-10-CM

## 2024-08-12 DIAGNOSIS — I25.83 CORONARY ARTERY DISEASE DUE TO LIPID RICH PLAQUE: ICD-10-CM

## 2024-08-12 DIAGNOSIS — R53.83 FATIGUE, UNSPECIFIED TYPE: ICD-10-CM

## 2024-08-12 DIAGNOSIS — R53.1 DECREASED STRENGTH, ENDURANCE, AND MOBILITY: Primary | ICD-10-CM

## 2024-08-12 DIAGNOSIS — E11.65 TYPE 2 DIABETES MELLITUS WITH HYPERGLYCEMIA, WITHOUT LONG-TERM CURRENT USE OF INSULIN (HCC): ICD-10-CM

## 2024-08-12 DIAGNOSIS — R79.89 LOW VITAMIN B12 LEVEL: ICD-10-CM

## 2024-08-12 LAB — HBA1C MFR BLD: 7.6 %

## 2024-08-12 RX ORDER — CYANOCOBALAMIN 1000 UG/ML
1000 INJECTION, SOLUTION INTRAMUSCULAR; SUBCUTANEOUS
Qty: 1 ML | Refills: 5 | Status: SHIPPED | OUTPATIENT
Start: 2024-08-12

## 2024-08-12 NOTE — PROGRESS NOTES
Patient: Linn Aguayo is a 74 y.o. female who presents today with the following Chief Complaint(s):  No chief complaint on file.      HPI    Patient worked in today acutely as she is \"just not feeling well\".     Continues to feel just tired. Denies shortness of breath. Denies chest pain. Has had palpitations the other night which she has not had in several years.  Symptoms happened at night after she got up to use the restroom. Lasted for several minutes (maybe 15-20 minutes). Pulse was regular and fast.      Did not check into her cardiac rehab benefit as of yet.     Fatigue is better today than it was 2 weeks ago.   When she gets up to work, she needs to sit and rest     Does not sleep well at night. Gets up several times at night to use the bathroom.       EF 50% on echo in March 2024.     POCT HbA1C 7.6% 8/12/24.  Has not yet started Jardiance. States that she has felt overwhelmed. Does have samples at home.     Lab Results   Component Value Date    WBC 5.3 07/26/2024    HGB 13.3 07/26/2024    HCT 39.0 07/26/2024    MCV 91.5 07/26/2024     07/26/2024     Lab Results   Component Value Date     07/26/2024    K 4.1 07/26/2024     07/26/2024    CO2 24 07/26/2024    BUN 11 07/26/2024    CREATININE 0.6 07/26/2024    GLUCOSE 130 (H) 07/26/2024    CALCIUM 9.4 07/26/2024    BILITOT 0.3 07/26/2024    ALKPHOS 103 07/26/2024    AST 15 07/26/2024    ALT 15 07/26/2024    LABGLOM >90 07/26/2024    GFRAA >60 08/01/2022    AGRATIO 1.6 07/26/2024    GLOB 2.7 10/20/2021        omponent  Ref Range & Units 7/26/24 1551 4/15/24 1244 4/11/23 1017 6/14/21 0856   TSH  0.27 - 4.20 uIU/mL 1.05        omponent  Ref Range & Units 7/26/24 1550 4/15/24 1249   Vitamin B-12  211 - 911 pg/mL 238 168 Low    Folate  4.78 - 24.20 ng/mL 14.21 16.64 CM        Results for orders placed or performed in visit on 08/12/24   POCT glycosylated hemoglobin (Hb A1C)   Result Value Ref Range    Hemoglobin A1C 7.6 %      Lab Results

## 2024-08-13 ENCOUNTER — CARE COORDINATION (OUTPATIENT)
Dept: CARE COORDINATION | Age: 74
End: 2024-08-13

## 2024-08-13 NOTE — CARE COORDINATION
Ambulatory Care Coordination Note     2024 3:57 PM     Patient Current Location:  [place]     Patient contacted the patient by telephone. Verified name and  with patient as identifiers.         ACM: Rhiannon French RN     Challenges to be reviewed by the provider   Additional needs identified to be addressed with provider No  none               Method of communication with provider: chart routing.    Care Summary Note:    ACM advised patient that Jardiance co-pay is $47/month. Patient states she plans to take her sample pack first, then will pickup her prescription. Patient was seen by Dr. Barr on . A1C was 7.6. Patient has an appointment with endocrinology in October. Patient declined diabetic resources at this time.     Patient states she wasn't aware Cardiac Rehab outreached her to schedule an evaluation. Patient intends to check her messages and call them back to schedule. ACM offered to provide patient with their contact information but patient states it is on her voicemail.      Patient states she has noticed bruising to her arms/legs since being on dual antiplatelet therapy. Patient wondering if this is to be expected. ACM informed patient it isn't uncommon with dual therapy. ACM education patient on red flag sx and when to notify her provider, patient vu and states she has no concerning sx.     Offered patient enrollment in the Remote Patient Monitoring (RPM) program for in-home monitoring: Yes, but did not enroll at this time: already monitoring with home equipment.     Assessments Completed:   Hypertension - Encounter Level    Symptom course: stable       and   General Assessment    Do you have any symptoms that are causing concern?: No          Medications Reviewed:   Completed during a previous call     Advance Care Planning:   Not reviewed during this call     Care Planning:   Education Documentation  No documentation found.  Education Comments  No comments found.         PCP/Specialist follow up:

## 2024-08-16 NOTE — ASSESSMENT & PLAN NOTE
Patient sustained NSTEMI in May 2024.  Symptoms were consistent with her previous MI symptoms of epigastric pain and jaw pain.  She underwent cardiac catheterization with stent placed to RCA and was started on Plavix.  Cardiac rehab was recommended but she initially declined due to cost.  Patient continues to struggle with poor endurance since her MI.  Reviewed with patient that my concern is her poor endurance stems from her MI and I strongly recommend cardiac rehab.  Also strongly recommend patient reach out to cardiology.

## 2024-08-16 NOTE — ASSESSMENT & PLAN NOTE
Patient continues to struggle with poor endurance since her MI.  Reviewed with patient that my concern is her poor endurance stems from her MI and I strongly recommend cardiac rehab.  Also strongly recommend patient reach out to cardiology.  Labs from 7/26/2024 revealed a low normal vitamin B12 level but everything else was otherwise unremarkable including CMP, CBC, and TSH.  Recommend adding over-the-counter vitamin B12 1000 mcg daily.  Reviewed with patient that low normal B12 is unlikely the cause of her decreased endurance and fatigue.

## 2024-08-16 NOTE — ASSESSMENT & PLAN NOTE
POCT hemoglobin A1c is 7.6%.  She has not yet started Jardiance as she is feeling overwhelmed.  Discussed with patient risks and benefits of Jardiance and I have strongly encouraged her to start Jardiance.  We discussed that Jardiance is cardioprotective and renal protective.  Patient does have Jardiance at home.  Discussed management of potential side effects of Jardiance including increasing water, voiding as soon as needed, and use of wet wipes after voiding to decrease risks of UTI and genital yeast infections.  Continue metformin ER 1500 mg daily and glipizide 2.5 mg twice daily.  She does have an appointment with endocrinology in October.

## 2024-08-16 NOTE — ASSESSMENT & PLAN NOTE
Blood pressure is elevated today but typically well-controlled.  Continue Cardizem  mg twice daily and lisinopril 20 mg twice daily.  I do not think hypertension or medications are contributing to patient's fatigue.

## 2024-08-21 ENCOUNTER — NURSE ONLY (OUTPATIENT)
Dept: INTERNAL MEDICINE CLINIC | Age: 74
End: 2024-08-21
Payer: MEDICARE

## 2024-08-21 DIAGNOSIS — E53.8 B12 DEFICIENCY: Primary | ICD-10-CM

## 2024-08-21 PROCEDURE — 96372 THER/PROPH/DIAG INJ SC/IM: CPT | Performed by: INTERNAL MEDICINE

## 2024-08-21 RX ORDER — CYANOCOBALAMIN 1000 UG/ML
1000 INJECTION, SOLUTION INTRAMUSCULAR; SUBCUTANEOUS ONCE
Status: COMPLETED | OUTPATIENT
Start: 2024-08-21 | End: 2024-08-21

## 2024-08-21 RX ADMIN — CYANOCOBALAMIN 1000 MCG: 1000 INJECTION, SOLUTION INTRAMUSCULAR; SUBCUTANEOUS at 13:29

## 2024-09-09 RX ORDER — DILTIAZEM HYDROCHLORIDE 240 MG/1
240 CAPSULE, EXTENDED RELEASE ORAL 2 TIMES DAILY
Qty: 180 CAPSULE | Refills: 1 | Status: SHIPPED | OUTPATIENT
Start: 2024-09-09

## 2024-09-13 ENCOUNTER — CARE COORDINATION (OUTPATIENT)
Dept: CARE COORDINATION | Age: 74
End: 2024-09-13

## 2024-09-18 ENCOUNTER — NURSE ONLY (OUTPATIENT)
Dept: INTERNAL MEDICINE CLINIC | Age: 74
End: 2024-09-18
Payer: MEDICARE

## 2024-09-18 DIAGNOSIS — E53.8 B12 DEFICIENCY: Primary | ICD-10-CM

## 2024-09-18 PROCEDURE — 96372 THER/PROPH/DIAG INJ SC/IM: CPT | Performed by: INTERNAL MEDICINE

## 2024-09-18 RX ORDER — CYANOCOBALAMIN 1000 UG/ML
1000 INJECTION, SOLUTION INTRAMUSCULAR; SUBCUTANEOUS ONCE
Status: COMPLETED | OUTPATIENT
Start: 2024-09-18 | End: 2024-09-18

## 2024-09-18 RX ADMIN — CYANOCOBALAMIN 1000 MCG: 1000 INJECTION, SOLUTION INTRAMUSCULAR; SUBCUTANEOUS at 13:01

## 2024-09-20 ENCOUNTER — CARE COORDINATION (OUTPATIENT)
Dept: CARE COORDINATION | Age: 74
End: 2024-09-20

## 2024-09-24 ENCOUNTER — CARE COORDINATION (OUTPATIENT)
Dept: CARE COORDINATION | Age: 74
End: 2024-09-24

## 2024-10-07 ENCOUNTER — CARE COORDINATION (OUTPATIENT)
Dept: CARE COORDINATION | Age: 74
End: 2024-10-07

## 2024-10-07 NOTE — CARE COORDINATION
China Galarza MD Endocrinology [phone]    11/26/2024 3:00 PM Lyssa Barr DO Internal Medicine [phone]    1/15/2025 1:00 PM Magdi Osullivan MD Cardiology [phone]            Follow Up:   Plan for next ACM outreach in approximately 2 weeks to complete:  - refer to msw regarding oop expense for cardiac rehab  -diabetes  -needs & concerns   Patient  is agreeable to this plan.

## 2024-10-14 ENCOUNTER — CARE COORDINATION (OUTPATIENT)
Dept: CARE COORDINATION | Age: 74
End: 2024-10-14

## 2024-10-14 NOTE — CARE COORDINATION
SW received referral from [facility] for assistance with Jardiance copay and OOP cost for cardiac rehab. SW placed call to patient to discuss.  Patient does not have AD's established and is agreeable to receive packet via mail. [facility]     SW explained that OOP cost estimate can be obtained through her insurance member services line. SW offered to place a 3-way call and patient prefers to call her insurance plan herself. She reports it cost around $300 when she last participated and will find out if the cost has changed. She inquired if she would need to contact the cardiologist to start rehab again and SW instructed her to do so. Explained they will put an order in her chart as they did a couple of months ago.     SW also discussed Jardiance copay and patient reiterated that it would cost $47/mo. She will also inquire about this with her insurance.   Patient stated she will contact insurance after hanging up with this worker. SW encouraged her to call this worker back if any questions arise; ensured she has this worker's contact number.      SW to follow up with patient in two weeks.     Narda Madera MSW, LSW     [phone]

## 2024-10-21 ENCOUNTER — CARE COORDINATION (OUTPATIENT)
Dept: CARE COORDINATION | Age: 74
End: 2024-10-21

## 2024-10-22 ENCOUNTER — NURSE ONLY (OUTPATIENT)
Dept: INTERNAL MEDICINE CLINIC | Age: 74
End: 2024-10-22
Payer: MEDICARE

## 2024-10-22 DIAGNOSIS — E53.8 B12 DEFICIENCY: Primary | ICD-10-CM

## 2024-10-22 PROCEDURE — 96372 THER/PROPH/DIAG INJ SC/IM: CPT | Performed by: INTERNAL MEDICINE

## 2024-10-22 RX ORDER — CYANOCOBALAMIN 1000 UG/ML
1000 INJECTION, SOLUTION INTRAMUSCULAR; SUBCUTANEOUS ONCE
Status: COMPLETED | OUTPATIENT
Start: 2024-10-22 | End: 2024-10-22

## 2024-10-22 RX ADMIN — CYANOCOBALAMIN 1000 MCG: 1000 INJECTION, SOLUTION INTRAMUSCULAR; SUBCUTANEOUS at 15:30

## 2024-10-22 NOTE — CARE COORDINATION
Linn Aguayo  10/21/2024    Registered Dietitian Progress Note for Care Coordination    Assessment: Linn is a 74 y.o. female.  RD referred for DM. RD spoke with patient for initial nutrition assessment on 9/24/24. RD called to follow up with patient today, 10/21/24. RD discussed previous goals with patient. Patient states that she just received the nutrition handouts RD sent in the mail, but has not yet had time to review them. Patient reports that she continues to check her blood sugars twice daily. Patient reports a fasting blood sugar of 157 mg/dL this morning. Patient states that she is taking her Metformin and Glipizide, but not the Jardiance. Has an upcoming appointment with Endocrinology on 10/29, and wants to wait until then to decide whether she wants to take Jardiance or not. Patient reports that she does not have much energy s/p heart attack in May this year. Notes that she is getting her third Vitamin B12 shot soon. RD reiterated importance of adequate fluid and protein intake. Patient reports that she drinks water \"all day long,\" and tries to get enough protein intake. Patient reports that she is a \"big fruit eater.\" Asked RD how much fruit she should be consuming in a day. RD explained the proper portion sizes for fruit and vegetables, and encouraged patient to consume no more than 2 cups per day. Patient verbalized understanding. RD offered to send handout regarding fruit and vegetable portion sizes, and patient was agreeable. Patient reports that she will be starting Cardiac Rehab soon, and is looking forward to that.    Nutrition Monitoring and Evaluation  Indicator/Goal Criteria Progress   #1 Pair protein with carbohydrates  #1  I will have source of protein with source of carbohydrates  #1 Patient is pairing protein with carbohydrates    #2 Eat consistently throughout the day  #2 I will eat three meals per day or four to six small meals/snacks per day  #2 Patient is eating two to three

## 2024-10-28 ENCOUNTER — CARE COORDINATION (OUTPATIENT)
Dept: CARE COORDINATION | Age: 74
End: 2024-10-28

## 2024-10-28 NOTE — CARE COORDINATION
SW placed follow up call to patient to ensure receipt of AD packet. Unable to leave voicemail as mailbox has not been set up. Will continue to follow.    Narda Madera MSW, LSW     [phone]

## 2024-10-30 ENCOUNTER — CARE COORDINATION (OUTPATIENT)
Dept: CARE COORDINATION | Age: 74
End: 2024-10-30

## 2024-10-30 NOTE — CARE COORDINATION
Ambulatory Care Coordination Note     10/30/2024 9:31 AM     Patient outreach attempt by this ACM today to perform care management follow up . ACM was unable to reach the patient by telephone today;  no answer, VM not setup     ACM: Rhiannon French RN     Care Summary Note:     PCP/Specialist follow up:   Future Appointments         Provider Specialty Dept Phone    11/21/2024 11:00 AM Lyssa Barr DO Internal Medicine [phone]    1/15/2025 1:00 PM Magdi Osullivan MD Cardiology [phone]            Follow Up:   Plan for next ACM outreach in approximately 2 weeks to complete:  - refer to msw regarding oop expense for cardiac rehab  -diabetes  -needs & concerns   -patient cancelled 10/29 appointment with endo   -possible discharge, disengaged

## 2024-11-04 ENCOUNTER — CARE COORDINATION (OUTPATIENT)
Dept: CARE COORDINATION | Age: 74
End: 2024-11-04

## 2024-11-04 NOTE — CARE COORDINATION
SW placed second follow up call to patient to ensure receipt of AD packet. Unable to leave voicemail as mailbox has not been set up. Will continue to follow.     Narda Madera MSW, LSW     [phone]

## 2024-11-11 ENCOUNTER — CARE COORDINATION (OUTPATIENT)
Dept: CARE COORDINATION | Age: 74
End: 2024-11-11

## 2024-11-11 NOTE — CARE COORDINATION
SW placed third follow up call to patient to ensure receipt of AD packet. Unable to leave voicemail as mailbox has not been set up. Will sign off at this time due to failed outreach attempts.      Narda Madera MSW, LSW     [phone]

## 2024-11-14 ENCOUNTER — CARE COORDINATION (OUTPATIENT)
Dept: CARE COORDINATION | Age: 74
End: 2024-11-14

## 2024-11-14 NOTE — CARE COORDINATION
Ambulatory Care Coordination Note     11/14/2024 12:42 PM     Patient outreach attempt by this ACM today to perform care management follow up .   No answer, VM not setup     ACM: Rhiannon French RN      Care Summary Note:     PCP/Specialist follow up:   Future Appointments         Provider Specialty Dept Phone    11/21/2024 11:00 AM Lyssa Barr DO Internal Medicine [phone]    1/15/2025 1:00 PM Magdi Osullivan MD Cardiology [phone]            Follow Up:   Plan for next ACM outreach in approximately 1 week to complete:  - refer to msw regarding oop expense for cardiac rehab  -diabetes  -needs & concerns   -patient cancelled 10/29 appointment with endo   -possible discharge, disengaged.

## 2024-11-15 ENCOUNTER — CARE COORDINATION (OUTPATIENT)
Dept: CARE COORDINATION | Age: 74
End: 2024-11-15

## 2024-11-18 DIAGNOSIS — E11.65 TYPE 2 DIABETES MELLITUS WITH HYPERGLYCEMIA, WITHOUT LONG-TERM CURRENT USE OF INSULIN (HCC): ICD-10-CM

## 2024-11-18 DIAGNOSIS — R79.89 LOW VITAMIN B12 LEVEL: ICD-10-CM

## 2024-11-18 DIAGNOSIS — I10 PRIMARY HYPERTENSION: ICD-10-CM

## 2024-11-18 DIAGNOSIS — I25.10 CORONARY ARTERY DISEASE DUE TO LIPID RICH PLAQUE: ICD-10-CM

## 2024-11-18 DIAGNOSIS — I25.83 CORONARY ARTERY DISEASE DUE TO LIPID RICH PLAQUE: ICD-10-CM

## 2024-11-18 LAB
ALBUMIN SERPL-MCNC: 4.5 G/DL (ref 3.4–5)
ALBUMIN/GLOB SERPL: 2 {RATIO} (ref 1.1–2.2)
ALP SERPL-CCNC: 99 U/L (ref 40–129)
ALT SERPL-CCNC: 14 U/L (ref 10–40)
ANION GAP SERPL CALCULATED.3IONS-SCNC: 12 MMOL/L (ref 3–16)
AST SERPL-CCNC: 17 U/L (ref 15–37)
BASOPHILS # BLD: 0 K/UL (ref 0–0.2)
BASOPHILS NFR BLD: 0.5 %
BILIRUB SERPL-MCNC: 0.4 MG/DL (ref 0–1)
BUN SERPL-MCNC: 10 MG/DL (ref 7–20)
CALCIUM SERPL-MCNC: 9.7 MG/DL (ref 8.3–10.6)
CHLORIDE SERPL-SCNC: 102 MMOL/L (ref 99–110)
CHOLEST SERPL-MCNC: 188 MG/DL (ref 0–199)
CO2 SERPL-SCNC: 25 MMOL/L (ref 21–32)
CREAT SERPL-MCNC: 0.6 MG/DL (ref 0.6–1.2)
DEPRECATED RDW RBC AUTO: 13.2 % (ref 12.4–15.4)
EOSINOPHIL # BLD: 0.1 K/UL (ref 0–0.6)
EOSINOPHIL NFR BLD: 1.7 %
EST. AVERAGE GLUCOSE BLD GHB EST-MCNC: 182.9 MG/DL
FOLATE SERPL-MCNC: 14.3 NG/ML (ref 4.78–24.2)
GFR SERPLBLD CREATININE-BSD FMLA CKD-EPI: >90 ML/MIN/{1.73_M2}
GLUCOSE SERPL-MCNC: 191 MG/DL (ref 70–99)
HBA1C MFR BLD: 8 %
HCT VFR BLD AUTO: 40.4 % (ref 36–48)
HDLC SERPL-MCNC: 44 MG/DL (ref 40–60)
HGB BLD-MCNC: 13.9 G/DL (ref 12–16)
LDLC SERPL CALC-MCNC: ABNORMAL MG/DL
LDLC SERPL-MCNC: 82 MG/DL
LYMPHOCYTES # BLD: 1.3 K/UL (ref 1–5.1)
LYMPHOCYTES NFR BLD: 22.9 %
MAGNESIUM SERPL-MCNC: 1.78 MG/DL (ref 1.8–2.4)
MCH RBC QN AUTO: 30.9 PG (ref 26–34)
MCHC RBC AUTO-ENTMCNC: 34.5 G/DL (ref 31–36)
MCV RBC AUTO: 89.8 FL (ref 80–100)
MONOCYTES # BLD: 0.4 K/UL (ref 0–1.3)
MONOCYTES NFR BLD: 6.3 %
NEUTROPHILS # BLD: 3.8 K/UL (ref 1.7–7.7)
NEUTROPHILS NFR BLD: 68.6 %
PLATELET # BLD AUTO: 221 K/UL (ref 135–450)
PMV BLD AUTO: 8.8 FL (ref 5–10.5)
POTASSIUM SERPL-SCNC: 4 MMOL/L (ref 3.5–5.1)
PROT SERPL-MCNC: 6.7 G/DL (ref 6.4–8.2)
RBC # BLD AUTO: 4.5 M/UL (ref 4–5.2)
SODIUM SERPL-SCNC: 139 MMOL/L (ref 136–145)
TRIGL SERPL-MCNC: 391 MG/DL (ref 0–150)
VIT B12 SERPL-MCNC: 410 PG/ML (ref 211–911)
VLDLC SERPL CALC-MCNC: ABNORMAL MG/DL
WBC # BLD AUTO: 5.6 K/UL (ref 4–11)

## 2024-11-18 NOTE — CARE COORDINATION
building balanced meals       Plan of Care:  RD encouraged patient to keep working toward goals set.,RD will follow up with patient to discuss any questions patient has and check the progress toward goals.     Follow Up:    RD will call patient in four weeks to follow up and answer any nutrition related questions at that time.     Lily Peterson RDN,    [phone]

## 2024-11-19 ENCOUNTER — CARE COORDINATION (OUTPATIENT)
Dept: CARE COORDINATION | Age: 74
End: 2024-11-19

## 2024-11-19 NOTE — CARE COORDINATION
Ambulatory Care Coordination Note     2024 1:18 PM     Patient Current Location:  [place]     ACM contacted the patient by telephone. Verified name and  with patient as identifiers.         ACM: Rhiannon French RN     Challenges to be reviewed by the provider   Additional needs identified to be addressed with provider No  none               ACM outreached patient for cc follow up. Patient leaving to take her  to his urology appointment. Requesting call back tomorrow.    PCP/Specialist follow up:   Future Appointments         Provider Specialty Dept Phone    2024 11:00 AM Lyssa Barr DO Internal Medicine [phone]    1/15/2025 1:00 PM Magdi Osullivan MD Cardiology [phone]            Follow Up:   Plan for next ACM outreach in approximately 1-2 days  to complete:  - refer to msw regarding oop expense for cardiac rehab  -diabetes  -needs & concerns   -patient cancelled 10/29 appointment with endo   -possible discharge, disengaged   Patient  is agreeable to this plan.

## 2024-11-20 ENCOUNTER — CARE COORDINATION (OUTPATIENT)
Dept: CARE COORDINATION | Age: 74
End: 2024-11-20

## 2024-11-20 NOTE — CARE COORDINATION
Ambulatory Care Coordination Note     11/20/2024 4:16 PM     Patient outreach attempt by this ACM today to perform care management follow up . ACM was unable to reach the patient by telephone today;   left voice message requesting a return phone call to this ACM.     ACM: Rhiannon French RN     Care Summary Note:     PCP/Specialist follow up:   Future Appointments         Provider Specialty Dept Phone    1/15/2025 1:00 PM Magdi Osullivan MD Cardiology [phone]    4/1/2025 3:00 PM Lyssa Barr DO Internal Medicine [phone]            Follow Up:   Plan for next ACM outreach in approximately 2 weeks to complete:  - refer to msw regarding oop expense for cardiac rehab  -diabetes  -needs & concerns   -patient cancelled 10/29 appointment with endo   -possible discharge, disengaged .

## 2024-11-21 ENCOUNTER — OFFICE VISIT (OUTPATIENT)
Dept: INTERNAL MEDICINE CLINIC | Age: 74
End: 2024-11-21

## 2024-11-21 VITALS
HEART RATE: 81 BPM | HEIGHT: 64 IN | BODY MASS INDEX: 23.63 KG/M2 | SYSTOLIC BLOOD PRESSURE: 130 MMHG | DIASTOLIC BLOOD PRESSURE: 64 MMHG | OXYGEN SATURATION: 98 % | WEIGHT: 138.4 LBS

## 2024-11-21 DIAGNOSIS — E11.65 TYPE 2 DIABETES MELLITUS WITH HYPERGLYCEMIA, WITHOUT LONG-TERM CURRENT USE OF INSULIN (HCC): ICD-10-CM

## 2024-11-21 DIAGNOSIS — I25.2 HISTORY OF MI (MYOCARDIAL INFARCTION): ICD-10-CM

## 2024-11-21 DIAGNOSIS — I25.10 CORONARY ARTERY DISEASE DUE TO LIPID RICH PLAQUE: Primary | ICD-10-CM

## 2024-11-21 DIAGNOSIS — E11.29 MICROALBUMINURIA DUE TO TYPE 2 DIABETES MELLITUS (HCC): ICD-10-CM

## 2024-11-21 DIAGNOSIS — E78.5 HYPERLIPIDEMIA LDL GOAL <70: ICD-10-CM

## 2024-11-21 DIAGNOSIS — R35.1 NOCTURIA: ICD-10-CM

## 2024-11-21 DIAGNOSIS — I25.83 CORONARY ARTERY DISEASE DUE TO LIPID RICH PLAQUE: Primary | ICD-10-CM

## 2024-11-21 DIAGNOSIS — R80.9 MICROALBUMINURIA DUE TO TYPE 2 DIABETES MELLITUS (HCC): ICD-10-CM

## 2024-11-21 DIAGNOSIS — I10 PRIMARY HYPERTENSION: ICD-10-CM

## 2024-11-21 NOTE — PROGRESS NOTES
Well-controlled.  Continue diltiazem  mg twice daily and lisinopril 20 mg twice daily.         Relevant Orders    CBC with Auto Differential    Comprehensive Metabolic Panel    Hyperlipidemia LDL goal <70      LDL is reasonable at 82.  Continue Crestor 20 mg daily.  Triglycerides are elevated which likely relates to poorly controlled diabetes.         Relevant Orders    Comprehensive Metabolic Panel    Lipid Panel    TSH with Reflex    Microalbuminuria due to type 2 diabetes mellitus (HCC)      Patient encouraged to start Jardiance 25 mg daily which has been prescribed in the past.         Relevant Medications    empagliflozin (JARDIANCE) 25 MG tablet    Nocturia      Discussed that with improved sugar control she may have some improvement in her nocturia.  Consider referral to urology.         Type 2 diabetes mellitus with hyperglycemia, without long-term current use of insulin ([facility])      Uncontrolled with hemoglobin A1c of 8%.  Patient has not yet started Jardiance but following lab results and long discussion with patient regarding safety and importance of Jardiance, she is agreeable.  Start Jardiance 25 mg daily.  Continue  glipizide ER 2.5 mg twice daily.  Hold metformin for 2 weeks and see if diarrhea improves.  If diarrhea does improve, would consider DPP 4 such as Jardiance or Onglyza.         Relevant Medications    empagliflozin (JARDIANCE) 25 MG tablet    Other Relevant Orders    Hemoglobin A1C       Current Outpatient Medications   Medication Sig Dispense Refill    empagliflozin (JARDIANCE) 25 MG tablet Take 1 tablet by mouth daily 30 tablet 5    dilTIAZem (TIAZAC) 240 MG extended release capsule TAKE 1 CAPSULE BY MOUTH 2 TIMES A  capsule 1    cyanocobalamin 1000 MCG/ML injection Inject 1 mL into the muscle every 30 days 1 mL 5    famotidine (PEPCID) 20 MG tablet Take 1 tablet by mouth 2 times daily 180 tablet 3    clopidogrel (PLAVIX) 75 MG tablet Take 1 tablet by mouth daily 90 tablet 3

## 2024-11-21 NOTE — PATIENT INSTRUCTIONS
Hold metformin for 2 weeks and see if your stomach pain gets better. Please let me know in the 1st or 2nd week in December and let me know how your stomach is doing. If it is better, we will try to re-start metformin but will start with 1 daily for a few weeks and then try to increase to 2 daily.     Jardiance helps your blood sugars by causing your kidneys to filter out excess sugar into your urine. In doing this, it does increase your risk of bladder infections and yeast infections. To prevent this, please make sure that you drink lots of water, you urinate when you need to, and clean well using wet wipes (such as Cottonelle or Vern flushable wipes) following urination. You may also feel light headed as a result of this medication as it does cause you to urinate more and may help to lower your blood pressure. Please let me know if this is happening. Please drink plenty of water. This medication is very good as it will help you lose weight and is protective of your heart.

## 2024-11-27 PROBLEM — H04.123 DRY EYES: Status: RESOLVED | Noted: 2023-08-15 | Resolved: 2024-11-27

## 2024-11-27 PROBLEM — I21.4 NSTEMI (NON-ST ELEVATED MYOCARDIAL INFARCTION) (HCC): Status: RESOLVED | Noted: 2024-05-11 | Resolved: 2024-11-27

## 2024-11-27 PROBLEM — R68.2 DRY MOUTH: Status: RESOLVED | Noted: 2021-10-23 | Resolved: 2024-11-27

## 2024-11-27 NOTE — ASSESSMENT & PLAN NOTE
Patient sustained NSTEMI in May 2024.  Symptoms were consistent with her previous MI symptoms of epigastric pain and jaw pain.  She underwent cardiac catheterization with stent placed to RCA and was started on Plavix.  Cardiac rehab was recommended but she initially declined due to cost.  Patient continues to struggle with poor endurance since her MI.  Reviewed that patient will need to remain on Plavix for at least 1 year following MI.  Discussed with patient that fatigue is not likely from Plavix.  Reviewed with patient her specific signs and symptoms of angina.  Discussed that she may need to try nitroglycerin if she has epigastric pain that radiates through to her back or if she is only having heartburn she may try Pepcid.

## 2024-11-27 NOTE — ASSESSMENT & PLAN NOTE
Uncontrolled with hemoglobin A1c of 8%.  Patient has not yet started Jardiance but following lab results and long discussion with patient regarding safety and importance of Jardiance, she is agreeable.  Start Jardiance 25 mg daily.  Continue  glipizide ER 2.5 mg twice daily.  Hold metformin for 2 weeks and see if diarrhea improves.  If diarrhea does improve, would consider DPP 4 such as Jardiance or Onglyza.

## 2024-11-27 NOTE — ASSESSMENT & PLAN NOTE
Discussed that with improved sugar control she may have some improvement in her nocturia.  Consider referral to urology.

## 2024-11-27 NOTE — ASSESSMENT & PLAN NOTE
LDL is reasonable at 82.  Continue Crestor 20 mg daily.  Triglycerides are elevated which likely relates to poorly controlled diabetes.

## 2024-11-27 NOTE — ASSESSMENT & PLAN NOTE
Patient sustained NSTEMI in May 2024.  Symptoms were consistent with her previous MI symptoms of epigastric pain and jaw pain.  She underwent cardiac catheterization with stent placed to RCA and was started on Plavix.  Cardiac rehab was recommended but she initially declined due to cost.

## 2024-12-03 ENCOUNTER — CARE COORDINATION (OUTPATIENT)
Dept: CARE COORDINATION | Age: 74
End: 2024-12-03

## 2024-12-03 NOTE — CARE COORDINATION
Ambulatory Care Coordination Note     12/3/2024 3:45 PM     Patient outreach attempt by this ACM today to perform care management follow up . ACM was unable to reach the patient by telephone today;   left voice message requesting a return phone call to this ACM.     ACM: Rhiannon French RN     Care Summary Note:     PCP/Specialist follow up:   Future Appointments         Provider Specialty Dept Phone    1/15/2025 1:00 PM Magdi Osullivan MD Cardiology [phone]    4/1/2025 3:00 PM Lyssa Barr DO Internal Medicine [phone]            Follow Up:   Plan for next ACM outreach in approximately 2 weeks to complete:  - refer to msw regarding oop expense for cardiac rehab  -diabetes  -needs & concerns   -patient cancelled 10/29 appointment with endo   -possible discharge, disengaged.

## 2024-12-05 RX ORDER — GLIPIZIDE 2.5 MG/1
TABLET, EXTENDED RELEASE ORAL
Qty: 180 TABLET | Refills: 1 | Status: SHIPPED | OUTPATIENT
Start: 2024-12-05

## 2024-12-10 ENCOUNTER — CARE COORDINATION (OUTPATIENT)
Dept: CARE COORDINATION | Age: 74
End: 2024-12-10

## 2024-12-10 ASSESSMENT — SOCIAL DETERMINANTS OF HEALTH (SDOH)
HOW OFTEN DO YOU ATTEND CHURCH OR RELIGIOUS SERVICES?: NEVER
HOW OFTEN DO YOU GET TOGETHER WITH FRIENDS OR RELATIVES?: MORE THAN THREE TIMES A WEEK
DO YOU BELONG TO ANY CLUBS OR ORGANIZATIONS SUCH AS CHURCH GROUPS UNIONS, FRATERNAL OR ATHLETIC GROUPS, OR SCHOOL GROUPS?: NO
HOW OFTEN DO YOU ATTENT MEETINGS OF THE CLUB OR ORGANIZATION YOU BELONG TO?: NEVER
IN A TYPICAL WEEK, HOW MANY TIMES DO YOU TALK ON THE PHONE WITH FAMILY, FRIENDS, OR NEIGHBORS?: MORE THAN THREE TIMES A WEEK

## 2024-12-10 NOTE — CARE COORDINATION
Ambulatory Care Coordination Note     12/10/2024 8:54 AM     Patient Current Location:  [place]     ACM contacted the patient by telephone. Verified name and  with patient as identifiers.         ACM: Rhiannon French RN     Challenges to be reviewed by the provider   Additional needs identified to be addressed with provider Yes  Patient requesting a new Endo referral               Method of communication with provider: chart routing.    Utilization: Patient has not had any utilization since our last call.     Care Summary Note:     ACM outreached patient. Patient apologized for the delay getting back to [facility], states phone has been \"messed up.\" ACM informed patient that VM on her cell phone is not setup so [facility] is unable to leave message. Patient states she is aware and has [facility]'s phone number saved so she knows when I call.     Patient states she spoke to her insurance about cardiac rehab and confirmed she has a co-pay. Patient states cardiac rehab will cost her ~$300 oop. Patient states she does intend to do cardiac rehab but doesn't want to start at this time. Not interested in applying for [employer] patient assistance at this time.     Diabetes - patient started Jardiance ~10 days ago. Reports improvement in blood sugars since starting. Patient states her fasting blood sugars went from 190s to 120s. Patient is monitoring blood sugars BID. ACM advised patient continue to monitor blood sugars and log readings. ACM reinforced hydration and hygiene while taking Jardiance to decrease risk for blood or yeast infection. ACM educated on s/sx and when to notify her pcp, patient madison. Patient was supposed to hold Metformin for 2 weeks to see if diarrhea improved but forgot to do so. Patient states she will stop taking it as of today. ACM advised patient I will follow up with her in 1 week to see if diarrhea has improved, patient madison.     Patient was scheduled with Endo on 10/29 but woke up sick the day of the appointment and had to

## 2024-12-17 ENCOUNTER — CARE COORDINATION (OUTPATIENT)
Dept: CARE COORDINATION | Age: 74
End: 2024-12-17

## 2024-12-17 NOTE — CARE COORDINATION
Ambulatory Care Coordination Note     12/17/2024 11:07 AM     ACM outreach attempt by this ACM today to perform care management follow up . ACM was unable to reach the patient by telephone today;   left voice message requesting a return phone call to this ACM.  Per kevin William to hold off on Endo referral     ACM: Rhiannon French RN     Care Summary Note:     PCP/Specialist follow up:   Future Appointments         Provider Specialty Dept Phone    1/15/2025 1:00 PM Magdi Osullivan MD Cardiology [phone]    4/1/2025 3:00 PM Lyssa Barr DO Internal Medicine [phone]            Follow Up:   Plan for next ACM outreach in approximately 1 week to complete:  - diarrhea  -blood sugars  -endo referral .

## 2024-12-27 ENCOUNTER — CARE COORDINATION (OUTPATIENT)
Dept: CARE COORDINATION | Age: 74
End: 2024-12-27

## 2024-12-27 NOTE — CARE COORDINATION
hygiene and risk of UTI associated with Jardiance. Advised patient that Jardiance will cause you to urinate more. Reinforced making sure that she drink lots of water, you urinate when you need to, and clean well using wet wipes following urination. Patient states she will closely monitor her sx and outreach her pcp with new or worsening urinary sx.     Offered patient enrollment in the Remote Patient Monitoring (RPM) program for in-home monitoring: Yes, but did not enroll at this time: already monitoring with home equipment.     Assessments Completed:   Diabetes Assessment    Medic Alert ID: No (Comment: 12/27/24 PIETER)  Meal Planning: Avoidance of concentrated sweets   How often do you test your blood sugar?: Other (Comment: 1-2 TIMES DAILY)   Do you have barriers with adherence to non-pharmacologic self-management interventions? (Nutrition/Exercise/Self-Monitoring): Yes   Have you ever had to go to the ED for symptoms of low blood sugar?: No       No patient-reported symptoms         ,   General Assessment    Do you have any symptoms that are causing concern?: No          Medications Reviewed:   Completed during this call    Advance Care Planning:   Not reviewed during this call     Care Planning:   Education Documentation  No documentation found.  Education Comments  No comments found.         PCP/Specialist follow up:   Future Appointments         Provider Specialty Dept Phone    1/15/2025 1:00 PM Magdi Osullivan MD Cardiology [phone]    4/1/2025 3:00 PM Lyssa Barr DO Internal Medicine [phone]            Follow Up:   Plan for next ACM outreach in approximately  1-2 weeks  to complete:  - urinary sx  -blood sugars   -DM resources  Patient  is agreeable to this plan.

## 2025-01-09 ENCOUNTER — CARE COORDINATION (OUTPATIENT)
Dept: CARE COORDINATION | Age: 75
End: 2025-01-09

## 2025-01-09 NOTE — CARE COORDINATION
Ambulatory Care Coordination Note     2025 3:26 PM     Patient Current Location:  [place]     ACM contacted the patient by telephone. Verified name and  with patient as identifiers.         ACM: Rhiannon French RN     Challenges to be reviewed by the provider   Additional needs identified to be addressed with provider Yes  Urinary frequency and malodorous urine x2 days.                Method of communication with provider: chart routing.    Utilization: Patient has not had any utilization since our last call.     Care Summary Note:     ACM outreached patient for cc follow up.     Patient continues to monitor blood sugars BID. Fasting blood sugars averaging 120. Patient reports evening blood sugars range between 180-200s but admits to checking blood sugars soon after eating dinner. ACM advised on checking evening blood sugars either prior to eating dinner or 2 hours after eating dinner, patient vu. Patient received diabetic literature in the mail yesterday but has not had time to review.     Patient reports urinary frequency, started with malodorous urine 2 days ago, denies dysuria, abd pain, fever. Started Jardiance last month. Patient wondering if she has a urinary tract infection. Patient aware AC will outreach pcp to request urine sample. Patient states she plans on coming into the office tomorrow to receive her B-12 injections and get stop by the lab to provide a sample.     No other concerns voiced at this time.     Offered patient enrollment in the Remote Patient Monitoring (RPM) program for in-home monitoring: Patient not interested     Assessments Completed:   Urinary frequency and malodorous urine    Medications Reviewed:   Patient denies any changes with medications and reports taking all medications as prescribed.    Advance Care Planning:   Not reviewed during this call     Care Planning:   Not completed during this call    PCP/Specialist follow up:   Future Appointments         Provider Specialty Dept

## 2025-01-10 ENCOUNTER — CARE COORDINATION (OUTPATIENT)
Dept: CARE COORDINATION | Age: 75
End: 2025-01-10

## 2025-01-10 DIAGNOSIS — R30.0 DYSURIA: Primary | ICD-10-CM

## 2025-01-10 NOTE — CARE COORDINATION
Patient advised order for urine has been placed.   Patient states she may wait to go to the lab until Monday due to potential for snow.   Red flag sx reviewed.

## 2025-01-13 ENCOUNTER — CARE COORDINATION (OUTPATIENT)
Dept: CARE COORDINATION | Age: 75
End: 2025-01-13

## 2025-01-15 ENCOUNTER — CARE COORDINATION (OUTPATIENT)
Dept: CARE COORDINATION | Age: 75
End: 2025-01-15

## 2025-01-15 NOTE — CARE COORDINATION
ACM outreached patient for cc follow up.  Patient states she has an  at her home and is requesting a call back on a different date.   ACM will follow up at later time.

## 2025-01-16 ENCOUNTER — NURSE ONLY (OUTPATIENT)
Dept: INTERNAL MEDICINE CLINIC | Age: 75
End: 2025-01-16
Payer: MEDICARE

## 2025-01-16 DIAGNOSIS — E53.8 B12 DEFICIENCY: Primary | ICD-10-CM

## 2025-01-16 PROCEDURE — 96372 THER/PROPH/DIAG INJ SC/IM: CPT | Performed by: INTERNAL MEDICINE

## 2025-01-16 RX ORDER — CYANOCOBALAMIN 1000 UG/ML
1000 INJECTION, SOLUTION INTRAMUSCULAR; SUBCUTANEOUS ONCE
Status: COMPLETED | OUTPATIENT
Start: 2025-01-16 | End: 2025-01-16

## 2025-01-16 RX ADMIN — CYANOCOBALAMIN 1000 MCG: 1000 INJECTION, SOLUTION INTRAMUSCULAR; SUBCUTANEOUS at 12:24

## 2025-01-17 ENCOUNTER — CARE COORDINATION (OUTPATIENT)
Dept: CARE COORDINATION | Age: 75
End: 2025-01-17

## 2025-01-17 NOTE — CARE COORDINATION
Ambulatory Care Coordination Note     1/17/2025 2:17 PM     Patient outreach attempt by this ACM today to perform care management follow up . ACM was unable to reach the patient by telephone today;   No answer, VM not setup     ACM: Rhiannon French RN     Care Summary Note:     PCP/Specialist follow up:   Future Appointments         Provider Specialty Dept Phone    2/14/2025 11:30 AM Magdi Osullivan MD Cardiology [phone]    4/1/2025 3:00 PM Lyssa Barr DO Internal Medicine [phone]            Follow Up:   Plan for next ACM outreach in approximately 1 week to complete:  urine .  Needs & concerns

## 2025-01-23 LAB
BILIRUB UR QL STRIP.AUTO: NEGATIVE
CLARITY UR: CLEAR
COLOR UR: YELLOW
GLUCOSE UR STRIP.AUTO-MCNC: >=1000 MG/DL
HGB UR QL STRIP.AUTO: NEGATIVE
KETONES UR STRIP.AUTO-MCNC: NEGATIVE MG/DL
LEUKOCYTE ESTERASE UR QL STRIP.AUTO: NEGATIVE
NITRITE UR QL STRIP.AUTO: NEGATIVE
PH UR STRIP.AUTO: 6.5 [PH] (ref 5–8)
PROT UR STRIP.AUTO-MCNC: NEGATIVE MG/DL
SP GR UR STRIP.AUTO: 1.01 (ref 1–1.03)
UA COMPLETE W REFLEX CULTURE PNL UR: ABNORMAL
UA DIPSTICK W REFLEX MICRO PNL UR: ABNORMAL
URN SPEC COLLECT METH UR: ABNORMAL
UROBILINOGEN UR STRIP-ACNC: 0.2 E.U./DL

## 2025-01-24 ENCOUNTER — CARE COORDINATION (OUTPATIENT)
Dept: CARE COORDINATION | Age: 75
End: 2025-01-24

## 2025-01-24 NOTE — CARE COORDINATION
Contacted Linn Aguayo regarding Dietitian follow up. Unable to leave voicemail message with call back number as patient's voicemail is not yet set up. RD will follow up as appropriate.         Lily Peterson RDN, LD  [phone]

## 2025-01-31 ENCOUNTER — CARE COORDINATION (OUTPATIENT)
Dept: CARE COORDINATION | Age: 75
End: 2025-01-31

## 2025-01-31 NOTE — CARE COORDINATION
Contacted Linn Aguayo regarding Dietitian follow up.  RD has attempted to call patient three times. Unable to leave a voicemail message with call back number as patient's voicemail box is not yet set up. RD sign off at this time and notify ACM. RD will follow/assist patient should phone call be returned.      Lily Peterson RDN, LD   [phone]

## 2025-01-31 NOTE — CARE COORDINATION
Ambulatory Care Coordination Note     1/31/2025 3:55 PM     Patient outreach attempt by this ACM today to perform care management follow up . ACM was unable to reach the patient by telephone today;   No answer, unable to leave message. VM not setup.     ACM: Rhiannon French RN     Care Summary Note:     PCP/Specialist follow up:   Future Appointments         Provider Specialty Dept Phone    2/14/2025 11:30 AM Magdi Osullivan MD Cardiology [phone]    4/1/2025 3:00 PM Lyssa Barr DO Internal Medicine [phone]            Follow Up:   Plan for next ACM outreach in approximately 1 week to complete:  urine  Needs & concerns

## 2025-02-04 NOTE — PROGRESS NOTES
[facility]    2/14/2025    Referring Provider: Lyssa Barr DO    HISTORY:  Linn Aguayo is a 74 y.o. female presenting for routine 6 month f/u. She was admitted to the hospital with an NSTEMI 5/2024 with complaints of epigastric pain. She was taken to the cath lab and had successful PCI to distal RCA. She returned to the emergency room on 6/1/2024 with complaints of epigastric pain, dull pain in her shoulders and lower abdomen radiating to her back. The pain has occurred after she has eaten. She also has HTN, HLD and palpitations on diltiazem. She has been experiencing fatigue since her MI. It was recommended that she start cardiac rehab which she did not do.     Today she arrives with her  and is ambulatory. She described some ongoing back pain and stomach issues. Her pain seems to worsen after eating and then persists. She does not exercise regularly. She previously would participate in walking at the mall. She still continues to cook, clean, etc. She has no cardiac limitations with day to day activities. She has had two episodes of palpitations since her PCI. Her last episode was about 2 months ago. She has no CP with exertion.         REVIEW OF SYSTEMS:  A complete review of systems was reviewed and is negative except as noted in the history of present illness.    Prior to Visit Medications    Medication Sig Taking? Authorizing Provider   glipiZIDE (GLUCOTROL XL) 2.5 MG extended release tablet TAKE 1 TABLET BY MOUTH TWICE A DAY WITH A MEAL Yes Lyssa Barr DO   empagliflozin (JARDIANCE) 25 MG tablet Take 1 tablet by mouth daily Yes Lyssa Barr DO   dilTIAZem (TIAZAC) 240 MG extended release capsule TAKE 1 CAPSULE BY MOUTH 2 TIMES A DAY Yes Lyssa Barr DO   cyanocobalamin 1000 MCG/ML injection Inject 1 mL into the muscle every 30 days Yes Lyssa Barr DO   famotidine (PEPCID) 20 MG tablet Take 1 tablet by mouth 2 times daily Yes Magdi Osullivan MD

## 2025-02-10 ENCOUNTER — CARE COORDINATION (OUTPATIENT)
Dept: CARE COORDINATION | Age: 75
End: 2025-02-10

## 2025-02-10 NOTE — PATIENT INSTRUCTIONS
Thank you for coming to see me today   Continue to be active   No changes to your medications     Schedule stress test    Establish with GI    Let us know if the palpitations increase in frequency     You can stop taking your aspirin on May 13th

## 2025-02-10 NOTE — CARE COORDINATION
Ambulatory Care Coordination Note     2/10/2025 3:44 PM     Patient Current Location:  [place]     ACM contacted the patient by telephone. Verified name and  with patient as identifiers.         ACM: Rhiannon French RN     Challenges to be reviewed by the provider   Additional needs identified to be addressed with provider No  none               Method of communication with provider: none.    Utilization: Patient has not had any utilization since our last call.     Care Summary Note:     ACM outreached patient. Overall reports she is doing well. Has had a lot going on at home, recently completed costly home repairs.     Diabetes - monitoring blood sugars twice daily, before breakfast and before dinner. Fasting blood sugars staying in 140s, evening blood sugars fluctuate. Unable to provide specific readings, states she has been overwhelmed with home repairs and hasn't been tracking her blood sugars like she should. Agreeable to resume monitoring BID and record readings.     Urine sample was negative for infections. Remain on Jardiance, no side effects reported.     ACM will follow up in 2 weeks, encouraged outreach if urgent needs arise    Offered patient enrollment in the Remote Patient Monitoring (RPM) program for in-home monitoring: Yes, but did not enroll at this time: already monitoring with home equipment.     Assessments Completed:   No changes since last call    Medications Reviewed:   Patient denies any changes with medications and reports taking all medications as prescribed.    Advance Care Planning:   Not reviewed during this call     Care Planning:   Not completed during this call    PCP/Specialist follow up:   Future Appointments         Provider Specialty Dept Phone    2025 11:30 AM Magdi Osullivan MD Cardiology [phone]    2025 3:00 PM Lyssa Barr DO Internal Medicine [phone]            Follow Up:   Plan for next AC outreach in approximately 2 weeks to complete:  - advance care

## 2025-02-14 ENCOUNTER — OFFICE VISIT (OUTPATIENT)
Dept: CARDIOLOGY CLINIC | Age: 75
End: 2025-02-14
Payer: MEDICARE

## 2025-02-14 VITALS
DIASTOLIC BLOOD PRESSURE: 80 MMHG | BODY MASS INDEX: 23.56 KG/M2 | WEIGHT: 138 LBS | HEIGHT: 64 IN | SYSTOLIC BLOOD PRESSURE: 132 MMHG | OXYGEN SATURATION: 95 % | HEART RATE: 90 BPM

## 2025-02-14 DIAGNOSIS — Z95.5 S/P CORONARY ARTERY STENT PLACEMENT: ICD-10-CM

## 2025-02-14 DIAGNOSIS — R00.2 PALPITATION: ICD-10-CM

## 2025-02-14 DIAGNOSIS — I25.83 CORONARY ARTERY DISEASE DUE TO LIPID RICH PLAQUE: Primary | ICD-10-CM

## 2025-02-14 DIAGNOSIS — K21.9 GASTROESOPHAGEAL REFLUX DISEASE, UNSPECIFIED WHETHER ESOPHAGITIS PRESENT: ICD-10-CM

## 2025-02-14 DIAGNOSIS — E11.65 TYPE 2 DIABETES MELLITUS WITH HYPERGLYCEMIA, WITHOUT LONG-TERM CURRENT USE OF INSULIN (HCC): ICD-10-CM

## 2025-02-14 DIAGNOSIS — R07.9 CHEST PAIN, UNSPECIFIED TYPE: ICD-10-CM

## 2025-02-14 DIAGNOSIS — I10 PRIMARY HYPERTENSION: ICD-10-CM

## 2025-02-14 DIAGNOSIS — R10.13 EPIGASTRIC PAIN: ICD-10-CM

## 2025-02-14 DIAGNOSIS — E78.5 HYPERLIPIDEMIA LDL GOAL <70: ICD-10-CM

## 2025-02-14 DIAGNOSIS — I25.10 CORONARY ARTERY DISEASE DUE TO LIPID RICH PLAQUE: Primary | ICD-10-CM

## 2025-02-14 PROCEDURE — 1124F ACP DISCUSS-NO DSCNMKR DOCD: CPT | Performed by: STUDENT IN AN ORGANIZED HEALTH CARE EDUCATION/TRAINING PROGRAM

## 2025-02-14 PROCEDURE — 1090F PRES/ABSN URINE INCON ASSESS: CPT | Performed by: STUDENT IN AN ORGANIZED HEALTH CARE EDUCATION/TRAINING PROGRAM

## 2025-02-14 PROCEDURE — 3017F COLORECTAL CA SCREEN DOC REV: CPT | Performed by: STUDENT IN AN ORGANIZED HEALTH CARE EDUCATION/TRAINING PROGRAM

## 2025-02-14 PROCEDURE — G2211 COMPLEX E/M VISIT ADD ON: HCPCS | Performed by: STUDENT IN AN ORGANIZED HEALTH CARE EDUCATION/TRAINING PROGRAM

## 2025-02-14 PROCEDURE — G8399 PT W/DXA RESULTS DOCUMENT: HCPCS | Performed by: STUDENT IN AN ORGANIZED HEALTH CARE EDUCATION/TRAINING PROGRAM

## 2025-02-14 PROCEDURE — 1159F MED LIST DOCD IN RCRD: CPT | Performed by: STUDENT IN AN ORGANIZED HEALTH CARE EDUCATION/TRAINING PROGRAM

## 2025-02-14 PROCEDURE — 3075F SYST BP GE 130 - 139MM HG: CPT | Performed by: STUDENT IN AN ORGANIZED HEALTH CARE EDUCATION/TRAINING PROGRAM

## 2025-02-14 PROCEDURE — 2022F DILAT RTA XM EVC RTNOPTHY: CPT | Performed by: STUDENT IN AN ORGANIZED HEALTH CARE EDUCATION/TRAINING PROGRAM

## 2025-02-14 PROCEDURE — 99214 OFFICE O/P EST MOD 30 MIN: CPT | Performed by: STUDENT IN AN ORGANIZED HEALTH CARE EDUCATION/TRAINING PROGRAM

## 2025-02-14 PROCEDURE — G8427 DOCREV CUR MEDS BY ELIG CLIN: HCPCS | Performed by: STUDENT IN AN ORGANIZED HEALTH CARE EDUCATION/TRAINING PROGRAM

## 2025-02-14 PROCEDURE — 1036F TOBACCO NON-USER: CPT | Performed by: STUDENT IN AN ORGANIZED HEALTH CARE EDUCATION/TRAINING PROGRAM

## 2025-02-14 PROCEDURE — 3046F HEMOGLOBIN A1C LEVEL >9.0%: CPT | Performed by: STUDENT IN AN ORGANIZED HEALTH CARE EDUCATION/TRAINING PROGRAM

## 2025-02-14 PROCEDURE — 3079F DIAST BP 80-89 MM HG: CPT | Performed by: STUDENT IN AN ORGANIZED HEALTH CARE EDUCATION/TRAINING PROGRAM

## 2025-02-14 PROCEDURE — G8420 CALC BMI NORM PARAMETERS: HCPCS | Performed by: STUDENT IN AN ORGANIZED HEALTH CARE EDUCATION/TRAINING PROGRAM

## 2025-02-24 ENCOUNTER — NURSE ONLY (OUTPATIENT)
Dept: INTERNAL MEDICINE CLINIC | Age: 75
End: 2025-02-24
Payer: MEDICARE

## 2025-02-24 DIAGNOSIS — E53.8 B12 DEFICIENCY: Primary | ICD-10-CM

## 2025-02-24 PROCEDURE — 96372 THER/PROPH/DIAG INJ SC/IM: CPT | Performed by: INTERNAL MEDICINE

## 2025-02-24 RX ORDER — CYANOCOBALAMIN 1000 UG/ML
1000 INJECTION, SOLUTION INTRAMUSCULAR; SUBCUTANEOUS ONCE
Status: COMPLETED | OUTPATIENT
Start: 2025-02-24 | End: 2025-02-24

## 2025-02-24 RX ORDER — CYANOCOBALAMIN 1000 UG/ML
1000 INJECTION, SOLUTION INTRAMUSCULAR; SUBCUTANEOUS ONCE
Qty: 1 ML | Refills: 0 | Status: SHIPPED | OUTPATIENT
Start: 2025-02-24 | End: 2025-02-24

## 2025-02-24 RX ADMIN — CYANOCOBALAMIN 1000 MCG: 1000 INJECTION, SOLUTION INTRAMUSCULAR; SUBCUTANEOUS at 14:34

## 2025-02-24 SDOH — ECONOMIC STABILITY: FOOD INSECURITY: WITHIN THE PAST 12 MONTHS, YOU WORRIED THAT YOUR FOOD WOULD RUN OUT BEFORE YOU GOT MONEY TO BUY MORE.: NEVER TRUE

## 2025-02-24 SDOH — ECONOMIC STABILITY: FOOD INSECURITY: WITHIN THE PAST 12 MONTHS, THE FOOD YOU BOUGHT JUST DIDN'T LAST AND YOU DIDN'T HAVE MONEY TO GET MORE.: NEVER TRUE

## 2025-03-06 DIAGNOSIS — E11.9 TYPE 2 DIABETES MELLITUS WITHOUT COMPLICATION, WITHOUT LONG-TERM CURRENT USE OF INSULIN (HCC): ICD-10-CM

## 2025-03-06 DIAGNOSIS — R79.89 LOW VITAMIN B12 LEVEL: ICD-10-CM

## 2025-03-07 RX ORDER — CYANOCOBALAMIN 1000 UG/ML
INJECTION, SOLUTION INTRAMUSCULAR; SUBCUTANEOUS
Qty: 1 ML | Refills: 5 | Status: SHIPPED | OUTPATIENT
Start: 2025-03-07

## 2025-03-07 RX ORDER — BLOOD SUGAR DIAGNOSTIC
STRIP MISCELLANEOUS
Qty: 100 STRIP | Refills: 3 | Status: SHIPPED | OUTPATIENT
Start: 2025-03-07

## 2025-03-07 NOTE — TELEPHONE ENCOUNTER
Last OV: 11/21/2024  Next OV: 4/1/2025    Next appointment due: Return in about 4 months (around 3/21/2025)     Last fill: 8/12/2024, 2/23/2025   Refills: 5, 3

## 2025-03-14 ENCOUNTER — CARE COORDINATION (OUTPATIENT)
Dept: CARE COORDINATION | Age: 75
End: 2025-03-14

## 2025-03-14 NOTE — CARE COORDINATION
ACM outreached patient who states she is out running errands. Patient requests call return next week.

## 2025-03-17 ENCOUNTER — CARE COORDINATION (OUTPATIENT)
Dept: CARE COORDINATION | Age: 75
End: 2025-03-17

## 2025-03-17 NOTE — CARE COORDINATION
Ambulatory Care Coordination Note     3/17/2025 1:55 PM     Patient outreach attempt by this ACM today to perform care management follow up . ACM was unable to reach the patient by telephone today;   No answer, VM not setup     ACM: Rhiannon French RN     Care Summary Note:     PCP/Specialist follow up:   Future Appointments         Provider Specialty Dept Phone    3/26/2025 12:30 PM (Arrive by 12:15 PM) F CV NM PREP 1 Cardiology [phone]    3/26/2025 1:00 PM (Arrive by 12:45 PM) [facility] CV NM CAMERA 1 Cardiology [phone]    3/26/2025 1:45 PM (Arrive by 1:30 PM) [facility] NM STRESS RM Cardiology [phone]    3/26/2025 2:30 PM (Arrive by 2:15 PM) F CV NM CAMERA 1 Cardiology [phone]    4/1/2025 3:00 PM Lyssa Barr DO Internal Medicine [phone]    8/15/2025 1:00 PM Magdi Osullivan MD Cardiology [phone]            Follow Up:   Plan for next ACM outreach in approximately 1 week to complete:  - advance care planning   -did she schedule GI appt?  -prep for discharge

## 2025-04-01 ENCOUNTER — OFFICE VISIT (OUTPATIENT)
Dept: INTERNAL MEDICINE CLINIC | Age: 75
End: 2025-04-01

## 2025-04-01 VITALS
WEIGHT: 138 LBS | DIASTOLIC BLOOD PRESSURE: 78 MMHG | SYSTOLIC BLOOD PRESSURE: 156 MMHG | HEIGHT: 64 IN | BODY MASS INDEX: 23.56 KG/M2 | HEART RATE: 100 BPM | OXYGEN SATURATION: 98 %

## 2025-04-01 DIAGNOSIS — I10 PRIMARY HYPERTENSION: ICD-10-CM

## 2025-04-01 DIAGNOSIS — E78.5 HYPERLIPIDEMIA LDL GOAL <70: ICD-10-CM

## 2025-04-01 DIAGNOSIS — I25.10 CORONARY ARTERY DISEASE DUE TO LIPID RICH PLAQUE: Primary | ICD-10-CM

## 2025-04-01 DIAGNOSIS — I25.2 HISTORY OF MI (MYOCARDIAL INFARCTION): ICD-10-CM

## 2025-04-01 DIAGNOSIS — E11.29 MICROALBUMINURIA DUE TO TYPE 2 DIABETES MELLITUS (HCC): ICD-10-CM

## 2025-04-01 DIAGNOSIS — M54.2 ACUTE NECK PAIN: ICD-10-CM

## 2025-04-01 DIAGNOSIS — I25.83 CORONARY ARTERY DISEASE DUE TO LIPID RICH PLAQUE: Primary | ICD-10-CM

## 2025-04-01 DIAGNOSIS — K21.9 GASTROESOPHAGEAL REFLUX DISEASE, UNSPECIFIED WHETHER ESOPHAGITIS PRESENT: ICD-10-CM

## 2025-04-01 DIAGNOSIS — E11.65 TYPE 2 DIABETES MELLITUS WITH HYPERGLYCEMIA, WITHOUT LONG-TERM CURRENT USE OF INSULIN (HCC): ICD-10-CM

## 2025-04-01 DIAGNOSIS — R80.9 MICROALBUMINURIA DUE TO TYPE 2 DIABETES MELLITUS (HCC): ICD-10-CM

## 2025-04-01 LAB — HBA1C MFR BLD: 7.4 %

## 2025-04-01 RX ORDER — GLIPIZIDE 5 MG/1
5 TABLET, FILM COATED, EXTENDED RELEASE ORAL
Qty: 60 TABLET | Refills: 5 | Status: SHIPPED | OUTPATIENT
Start: 2025-04-01

## 2025-04-01 RX ORDER — TIZANIDINE 2 MG/1
2 TABLET ORAL NIGHTLY PRN
Qty: 10 TABLET | Refills: 0 | Status: SHIPPED | OUTPATIENT
Start: 2025-04-01

## 2025-04-01 RX ORDER — PANTOPRAZOLE SODIUM 40 MG/1
40 TABLET, DELAYED RELEASE ORAL
Qty: 30 TABLET | Refills: 5 | Status: SHIPPED | OUTPATIENT
Start: 2025-04-01

## 2025-04-01 RX ORDER — HYDRALAZINE HYDROCHLORIDE 10 MG/1
10 TABLET, FILM COATED ORAL 2 TIMES DAILY
Qty: 60 TABLET | Refills: 3 | Status: SHIPPED | OUTPATIENT
Start: 2025-04-01

## 2025-04-01 ASSESSMENT — PATIENT HEALTH QUESTIONNAIRE - PHQ9
SUM OF ALL RESPONSES TO PHQ QUESTIONS 1-9: 0
2. FEELING DOWN, DEPRESSED OR HOPELESS: NOT AT ALL
1. LITTLE INTEREST OR PLEASURE IN DOING THINGS: NOT AT ALL
SUM OF ALL RESPONSES TO PHQ QUESTIONS 1-9: 0

## 2025-04-01 NOTE — PROGRESS NOTES
130/64     Pulse Readings from Last 3 Encounters:   04/01/25 100   02/14/25 90   11/21/24 81           POCT HbA1c 7.4% (4/1/25)    Lab Results   Component Value Date    LABA1C 7.4 04/01/2025    LABA1C 8.0 11/18/2024    LABA1C 7.6 08/12/2024     Lab Results   Component Value Date    CREATININE 0.8 04/01/2025           Allergies   Allergen Reactions    Doxycycline Nausea And Vomiting    Asa Arthritis Strength-Antacid [Aspirin Buff, Al Hyd-Mg Hyd] Nausea Only     Per patient stopped taking because it upset her stomach. She denies itching, swelling, rash.     Aspirin      Other reaction(s): GI Upset    Atorvastatin      Other reaction(s): Muscle Aches    Erythromycin Base Hives    Macrolides And Ketolides     Troleandomycin     Verapamil      Other reaction(s): Other (See Comments)  Low heart rate     Sulfa Antibiotics Rash      Past Medical History:   Diagnosis Date    Allergic rhinitis     Fibromyalgia     GERD (gastroesophageal reflux disease)     Herniated disc     Hyperlipidemia     Hypertension     MI 04/20/2018    OM1 2.5 x 18 manasa    NSTEMI (non-ST elevated myocardial infarction) (HCC) 05/11/2024    Type II or unspecified type diabetes mellitus without mention of complication, not stated as uncontrolled       Past Surgical History:   Procedure Laterality Date    CARDIAC PROCEDURE N/A 5/13/2024    Left heart cath / coronary angiography performed by Magdi Osullivan MD at [facility] CARDIAC CATH LAB    CARDIAC PROCEDURE N/A 5/13/2024    Insert stent maritza coronary performed by Magdi Osullivan MD at [facility] CARDIAC CATH LAB    CORONARY ANGIOPLASTY WITH STENT PLACEMENT  04/20/2018    MI- OM1 2.5 x 18 manasa    HYMENECTOMY        Social History     Socioeconomic History    Marital status:      Spouse name: Not on file    Number of children: Not on file    Years of education: Not on file    Highest education level: Not on file   Occupational History    Not on file   Tobacco Use    Smoking status: Never    Smokeless tobacco:

## 2025-04-02 PROBLEM — M54.2 ACUTE NECK PAIN: Status: ACTIVE | Noted: 2025-04-02

## 2025-04-02 LAB
ALBUMIN SERPL-MCNC: 4.6 G/DL (ref 3.4–5)
ALBUMIN/GLOB SERPL: 1.8 {RATIO} (ref 1.1–2.2)
ALP SERPL-CCNC: 93 U/L (ref 40–129)
ALT SERPL-CCNC: 14 U/L (ref 10–40)
ANION GAP SERPL CALCULATED.3IONS-SCNC: 13 MMOL/L (ref 3–16)
AST SERPL-CCNC: 17 U/L (ref 15–37)
BASOPHILS # BLD: 0 K/UL (ref 0–0.2)
BASOPHILS NFR BLD: 0.4 %
BILIRUB SERPL-MCNC: <0.2 MG/DL (ref 0–1)
BUN SERPL-MCNC: 16 MG/DL (ref 7–20)
CALCIUM SERPL-MCNC: 9.6 MG/DL (ref 8.3–10.6)
CHLORIDE SERPL-SCNC: 101 MMOL/L (ref 99–110)
CHOLEST SERPL-MCNC: 235 MG/DL (ref 0–199)
CO2 SERPL-SCNC: 24 MMOL/L (ref 21–32)
CREAT SERPL-MCNC: 0.8 MG/DL (ref 0.6–1.2)
DEPRECATED RDW RBC AUTO: 13.9 % (ref 12.4–15.4)
EOSINOPHIL # BLD: 0.1 K/UL (ref 0–0.6)
EOSINOPHIL NFR BLD: 1.6 %
GFR SERPLBLD CREATININE-BSD FMLA CKD-EPI: 76 ML/MIN/{1.73_M2}
GLUCOSE SERPL-MCNC: 83 MG/DL (ref 70–99)
HCT VFR BLD AUTO: 43.6 % (ref 36–48)
HDLC SERPL-MCNC: 44 MG/DL (ref 40–60)
HGB BLD-MCNC: 14.6 G/DL (ref 12–16)
LDLC SERPL CALC-MCNC: ABNORMAL MG/DL
LDLC SERPL-MCNC: 116 MG/DL
LYMPHOCYTES # BLD: 2.3 K/UL (ref 1–5.1)
LYMPHOCYTES NFR BLD: 36.7 %
MCH RBC QN AUTO: 30.6 PG (ref 26–34)
MCHC RBC AUTO-ENTMCNC: 33.6 G/DL (ref 31–36)
MCV RBC AUTO: 91.2 FL (ref 80–100)
MONOCYTES # BLD: 0.5 K/UL (ref 0–1.3)
MONOCYTES NFR BLD: 8.3 %
NEUTROPHILS # BLD: 3.3 K/UL (ref 1.7–7.7)
NEUTROPHILS NFR BLD: 53 %
PLATELET # BLD AUTO: 246 K/UL (ref 135–450)
PMV BLD AUTO: 9.3 FL (ref 5–10.5)
POTASSIUM SERPL-SCNC: 4 MMOL/L (ref 3.5–5.1)
PROT SERPL-MCNC: 7.1 G/DL (ref 6.4–8.2)
RBC # BLD AUTO: 4.78 M/UL (ref 4–5.2)
SODIUM SERPL-SCNC: 138 MMOL/L (ref 136–145)
TRIGL SERPL-MCNC: 614 MG/DL (ref 0–150)
TSH SERPL DL<=0.005 MIU/L-ACNC: 1.19 UIU/ML (ref 0.27–4.2)
VLDLC SERPL CALC-MCNC: ABNORMAL MG/DL
WBC # BLD AUTO: 6.3 K/UL (ref 4–11)

## 2025-04-03 ENCOUNTER — RESULTS FOLLOW-UP (OUTPATIENT)
Dept: INTERNAL MEDICINE CLINIC | Age: 75
End: 2025-04-03

## 2025-04-03 RX ORDER — EZETIMIBE 10 MG/1
10 TABLET ORAL DAILY
Qty: 90 TABLET | Refills: 1 | Status: SHIPPED | OUTPATIENT
Start: 2025-04-03

## 2025-04-03 RX ORDER — FENOFIBRATE 145 MG/1
145 TABLET, FILM COATED ORAL DAILY
Qty: 30 TABLET | Refills: 3 | Status: SHIPPED | OUTPATIENT
Start: 2025-04-03 | End: 2025-04-03

## 2025-04-03 RX ORDER — FENOFIBRATE 145 MG/1
145 TABLET, FILM COATED ORAL DAILY
Qty: 90 TABLET | Refills: 1 | Status: SHIPPED | OUTPATIENT
Start: 2025-04-03

## 2025-04-03 NOTE — ASSESSMENT & PLAN NOTE
Physical therapy screen for assessment in office today.  Add tizanidine 2 mg at at bedtime to help with spasm.    Addendum-after patient was seen for physical therapy screen, official physical therapy eval and treatment was recommended which patient agreed to.  Referral has been placed.

## 2025-04-03 NOTE — ASSESSMENT & PLAN NOTE
Improved with POCT hemoglobin A1c of 7.4%.  Continue Jardiance 25 mg daily, metformin  mg 3 times daily, and increase glipizide ER to 5 mg twice daily with meals.  Did not have improvement in diarrhea with discontinuing metformin.

## 2025-04-03 NOTE — ASSESSMENT & PLAN NOTE
Patient recently saw Dr. Durand who recommended a nuclear stress test and referral to GI given complaints of epigastric pain after eating.  She was scheduled for a stress test last week but canceled the test as she was feeling unwell.  Patient encouraged to reschedule the stress test.  Referral has also been placed to GI and she has been started on pantoprazole which should help with GERD symptoms.  Remains on Plavix 75 mg daily, diltiazem CD2 140 mg twice daily, lisinopril 20 mg twice daily, Jardiance 25 mg daily, Crestor 20 mg daily, and aspirin.        Cardiac rehab was recommended but she initially declined due to cost. Reviewed that patient will need to remain on Plavix for at least 1 year following MI.  Discussed with patient that fatigue is not likely from Plavix.  Reviewed with patient her specific signs and symptoms of angina.  Discussed that she may need to try nitroglycerin if she has epigastric pain that radiates through to her back or if she is only having heartburn she may try Pepcid.

## 2025-04-03 NOTE — ASSESSMENT & PLAN NOTE
Blood pressure is elevated today and patient reports that her blood pressure has been mildly elevated recently.  Add hydralazine 5 mg twice daily.  Continue diltiazem  mg twice daily and lisinopril 20 mg twice daily.

## 2025-04-03 NOTE — ASSESSMENT & PLAN NOTE
Patient recently saw Dr. Durand who recommended a nuclear stress test and referral to GI given complaints of epigastric pain after eating.  She was scheduled for a stress test last week but canceled the test as she was feeling unwell.  Patient encouraged to reschedule the stress test.  Referral has also been placed to GI and she has been started on pantoprazole which should help with GERD symptoms.  Remains on Plavix 75 mg daily, diltiazem CD2 140 mg twice daily, lisinopril 20 mg twice daily, Jardiance 25 mg daily, Crestor 20 mg daily, and aspirin.

## 2025-04-03 NOTE — ASSESSMENT & PLAN NOTE
Patient with epigastric pain after eating.  Does not radiate up into her neck and shoulders.  Start pantoprazole 40 mg daily.  Referral placed to GI.

## 2025-04-11 ENCOUNTER — OFFICE VISIT (OUTPATIENT)
Dept: INTERNAL MEDICINE CLINIC | Age: 75
End: 2025-04-11
Payer: MEDICARE

## 2025-04-11 VITALS
BODY MASS INDEX: 23.7 KG/M2 | HEART RATE: 80 BPM | OXYGEN SATURATION: 98 % | TEMPERATURE: 98 F | SYSTOLIC BLOOD PRESSURE: 144 MMHG | WEIGHT: 138.8 LBS | HEIGHT: 64 IN | DIASTOLIC BLOOD PRESSURE: 84 MMHG

## 2025-04-11 DIAGNOSIS — H91.91 HEARING LOSS OF RIGHT EAR, UNSPECIFIED HEARING LOSS TYPE: Primary | ICD-10-CM

## 2025-04-11 DIAGNOSIS — H66.90 SUBACUTE OTITIS MEDIA, UNSPECIFIED OTITIS MEDIA TYPE: ICD-10-CM

## 2025-04-11 PROCEDURE — 1090F PRES/ABSN URINE INCON ASSESS: CPT

## 2025-04-11 PROCEDURE — 3077F SYST BP >= 140 MM HG: CPT

## 2025-04-11 PROCEDURE — 1124F ACP DISCUSS-NO DSCNMKR DOCD: CPT

## 2025-04-11 PROCEDURE — 3079F DIAST BP 80-89 MM HG: CPT

## 2025-04-11 PROCEDURE — G8399 PT W/DXA RESULTS DOCUMENT: HCPCS

## 2025-04-11 PROCEDURE — 99214 OFFICE O/P EST MOD 30 MIN: CPT

## 2025-04-11 PROCEDURE — 1036F TOBACCO NON-USER: CPT

## 2025-04-11 PROCEDURE — G8420 CALC BMI NORM PARAMETERS: HCPCS

## 2025-04-11 PROCEDURE — G8427 DOCREV CUR MEDS BY ELIG CLIN: HCPCS

## 2025-04-11 PROCEDURE — 1159F MED LIST DOCD IN RCRD: CPT

## 2025-04-11 PROCEDURE — 3017F COLORECTAL CA SCREEN DOC REV: CPT

## 2025-04-11 NOTE — PROGRESS NOTES
Linn Aguayo (:  1950) is a 74 y.o. female,Established patient, here for evaluation of the following chief complaint(s):  Ear Pain (Right ear pain X's 2 weeks)      Assessment & Plan   ASSESSMENT/PLAN:  Assessment & Plan  Hearing loss of right ear, unspecified hearing loss type     Orders:    [facility]    amoxicillin-clavulanate (AUGMENTIN) 875-125 MG per tablet; Take 1 tablet by mouth 2 times daily for 7 days    Subacute otitis media, unspecified otitis media type  Having a problem with her right ear. Started with sharp pains in her ear about 1 week ago. Is slightly better but bothersome to a point where she can't sleep. Hurts to touch her ear or tug on it. Is having pain behind her right ear radiating to occiput. Was also having dizziness which is improving. Had low-grade temp (not measured). Denies congestion or drainage or URI symptoms.     Otherwise denies any fevers/chills, headaches, vision change, neck stiffness/pain, ear ache/hearing loss, difficulty swallowing, chest pain, palpitations, SOB, cough, abdominal pain, nausea or vomiting, constipation/diarrhea, urinary incontinence/dysuria/frequency/urgency/hematuria, joint pain/swelling, or any numbness/weakness. Denies syncopal episodes, vision changes, photophobia, painful EOM, sinus pressure, nasal congestion, epistaxis, sore throat, hoarseness, difficulty swallowing, oral lesions/dental pain, neck stiffness, or neck pain.     On physical examination, right tympanic membrane with purulent effusion behind the tympanic membrane, with slight bulging.  Tender during examination with otoscope.  Have prescribed antibiotics.  Also refer patient to audiology for hearing test. Patient does not have any focal findings of weakness, sensory deficits, paralysis, slurring of speech, facial drooping, paresthesia, neck stiffness/tenderness. Does not have nystagmus or painful EOM. Symptoms not exacerbated with head movement.  Does not have

## 2025-04-14 ENCOUNTER — CARE COORDINATION (OUTPATIENT)
Dept: CARE COORDINATION | Age: 75
End: 2025-04-14

## 2025-04-14 NOTE — ASSESSMENT & PLAN NOTE
Having a problem with her right ear. Started with sharp pains in her ear about 1 week ago. Is slightly better but bothersome to a point where she can't sleep. Hurts to touch her ear or tug on it. Is having pain behind her right ear radiating to occiput. Was also having dizziness which is improving. Had low-grade temp (not measured). Denies congestion or drainage or URI symptoms.     Otherwise denies any fevers/chills, headaches, vision change, neck stiffness/pain, ear ache/hearing loss, difficulty swallowing, chest pain, palpitations, SOB, cough, abdominal pain, nausea or vomiting, constipation/diarrhea, urinary incontinence/dysuria/frequency/urgency/hematuria, joint pain/swelling, or any numbness/weakness. Denies syncopal episodes, vision changes, photophobia, painful EOM, sinus pressure, nasal congestion, epistaxis, sore throat, hoarseness, difficulty swallowing, oral lesions/dental pain, neck stiffness, or neck pain.     On physical examination, right tympanic membrane with purulent effusion behind the tympanic membrane, with slight bulging.  Tender during examination with otoscope.  Have prescribed antibiotics.  Also refer patient to audiology for hearing test. Patient does not have any focal findings of weakness, sensory deficits, paralysis, slurring of speech, facial drooping, paresthesia, neck stiffness/tenderness. Does not have nystagmus or painful EOM. Symptoms not exacerbated with head movement.  Does not have any limb dysmetria, dysphagia or gait issues.     Patient was instructed to watch out for worsening symptoms including but not limited to fevers, chills, lightheadedness, headache, vision changes, shortness of breath, chest pain, nausea, vomiting, bleeding, syncope etc. If their clinical condition worsened, patient was instructed to either call the office or go straight to the ED.  Patient expressed understanding and agrees with the plan.

## 2025-04-14 NOTE — CARE COORDINATION
error  
Magdi Martinez MD Cardiology [phone]            Follow Up:   Plan for next AC outreach in approximately 1 week to complete:  - disease specific assessments  - medication review   - advance care planning   - education   - blood pressure  -prep for discharge   Patient  is agreeable to this plan.

## 2025-04-22 ENCOUNTER — CARE COORDINATION (OUTPATIENT)
Dept: CARE COORDINATION | Age: 75
End: 2025-04-22

## 2025-04-22 NOTE — CARE COORDINATION
Ambulatory Care Coordination Note     4/22/2025 1:14 PM     Patient outreach attempt by this ACM today to perform care management follow up . ACM was unable to reach the patient by telephone today;   No answer, unable to leave      ACM: Rhiannon French RN     Care Summary Note:     PCP/Specialist follow up:   Future Appointments         Provider Specialty Dept Phone    8/6/2025 2:40 PM Lyssa Barr DO Internal Medicine [phone]    8/15/2025 1:00 PM Magdi Osullivan MD Cardiology [phone]            Follow Up:   Plan for next ACM outreach in approximately 1 week to complete:  - disease specific assessments  - medication review   - advance care planning   - education   - blood pressure  -prep for discharge

## 2025-04-30 ENCOUNTER — CARE COORDINATION (OUTPATIENT)
Dept: CARE COORDINATION | Age: 75
End: 2025-04-30

## 2025-04-30 NOTE — CARE COORDINATION
Ambulatory Care Coordination Note     4/30/2025 2:57 PM     Patient outreach attempt by this ACM today to perform care management follow up . ACM was unable to reach the patient by telephone today;   No answer, unable to leave message. Attempt 2     ACM: Rhiannon French RN     Care Summary Note:     PCP/Specialist follow up:     Follow Up:   Plan for next ACM outreach in approximately  1-2 weeks  to complete:  - disease specific assessments  - medication review   - advance care planning   - education   - blood pressure  -prep for discharge

## 2025-05-13 ENCOUNTER — CARE COORDINATION (OUTPATIENT)
Dept: CARE COORDINATION | Age: 75
End: 2025-05-13

## 2025-05-13 NOTE — CARE COORDINATION
Ambulatory Care Coordination Note     5/13/2025 3:17 PM     Patient outreach attempt by this ACM today to perform care management follow up . ACM was unable to reach the patient by telephone today;   No answer, VM note setup     ACM: Rhiannon French RN     Care Summary Note:     PCP/Specialist follow up:   Future Appointments         Provider Specialty Dept Phone    8/6/2025 2:40 PM Lyssa Barr DO Internal Medicine [phone]    8/15/2025 1:00 PM Magdi Osullivan MD Cardiology [phone]            Follow Up:   Plan for next ACM outreach in approximately 1 week to complete:  - disease specific assessments  - medication review   - advance care planning   - education   - blood pressure  - discharge if no call return

## 2025-05-16 ENCOUNTER — CARE COORDINATION (OUTPATIENT)
Dept: CARE COORDINATION | Age: 75
End: 2025-05-16

## 2025-05-16 NOTE — CARE COORDINATION
Ambulatory Care Coordination Note     5/16/2025 4:00 PM     patient outreach attempt by this ACM today to perform care management follow up . ACM was unable to reach the patient by telephone today;   No answer, phone just rings     Patient closed (unable to reach patient) from the High Risk Care Management program on 5/16/2025.  No further Ambulatory Care Manager follow up scheduled.

## 2025-05-23 RX ORDER — EMPAGLIFLOZIN 25 MG/1
25 TABLET, FILM COATED ORAL DAILY
Qty: 30 TABLET | Refills: 5 | Status: SHIPPED | OUTPATIENT
Start: 2025-05-23

## 2025-06-09 DIAGNOSIS — E11.65 TYPE 2 DIABETES MELLITUS WITH HYPERGLYCEMIA, WITHOUT LONG-TERM CURRENT USE OF INSULIN (HCC): Primary | ICD-10-CM

## 2025-06-10 RX ORDER — METFORMIN HYDROCHLORIDE 500 MG/1
TABLET, EXTENDED RELEASE ORAL
Qty: 270 TABLET | Refills: 1 | Status: SHIPPED | OUTPATIENT
Start: 2025-06-10

## 2025-06-10 RX ORDER — LISINOPRIL 20 MG/1
20 TABLET ORAL 2 TIMES DAILY
Qty: 180 TABLET | Refills: 1 | Status: SHIPPED | OUTPATIENT
Start: 2025-06-10

## 2025-06-10 RX ORDER — DILTIAZEM HYDROCHLORIDE 240 MG/1
240 CAPSULE, EXTENDED RELEASE ORAL 2 TIMES DAILY
Qty: 180 CAPSULE | Refills: 1 | Status: SHIPPED | OUTPATIENT
Start: 2025-06-10

## 2025-06-10 NOTE — TELEPHONE ENCOUNTER
Medication:   Requested Prescriptions     Pending Prescriptions Disp Refills    dilTIAZem (TIAZAC) 240 MG extended release capsule [Pharmacy Med Name: dilTIAZem 24HR  MG CAP] 180 capsule 1     Sig: TAKE 1 CAPSULE BY MOUTH 2 TIMES A DAY    lisinopril (PRINIVIL;ZESTRIL) 20 MG tablet [Pharmacy Med Name: LISINOPRIL 20 MG TABLET] 180 tablet 3     Sig: TAKE 1 TABLET BY MOUTH 2 TIMES A DAY    metFORMIN (GLUCOPHAGE-XR) 500 MG extended release tablet [Pharmacy Med Name: METFORMIN HCL  MG TABLET] 270 tablet 3     Sig: TAKE THREE TABLETS BY MOUTH DAILY WITH BREAKFAST        Last Filled: Diltiazem (9/09/2024), Lisinopril and Metformin (6/11/2024)    Patient Phone Number: [phone] (home) [phone] (work)    Last appt: 4/11/2025   Next appt: 8/6/2025    Last OARRS:        No data to display                OK to fill

## 2025-06-30 DIAGNOSIS — I25.83 CORONARY ARTERY DISEASE DUE TO LIPID RICH PLAQUE: ICD-10-CM

## 2025-06-30 DIAGNOSIS — I25.10 CORONARY ARTERY DISEASE DUE TO LIPID RICH PLAQUE: ICD-10-CM

## 2025-06-30 RX ORDER — CLOPIDOGREL BISULFATE 75 MG/1
75 TABLET ORAL DAILY
Qty: 90 TABLET | Refills: 3 | Status: SHIPPED | OUTPATIENT
Start: 2025-06-30

## 2025-06-30 NOTE — TELEPHONE ENCOUNTER
Medication Requested: Clopidogrel   Preferred Pharmacy: [facility]  Last OV: 2025 [facility]  Next OV: 8/15/2025 [facility]  Labs/[id number] [facility]

## 2025-08-04 DIAGNOSIS — I25.83 CORONARY ARTERY DISEASE DUE TO LIPID RICH PLAQUE: ICD-10-CM

## 2025-08-04 DIAGNOSIS — E11.65 TYPE 2 DIABETES MELLITUS WITH HYPERGLYCEMIA, WITHOUT LONG-TERM CURRENT USE OF INSULIN (HCC): ICD-10-CM

## 2025-08-04 DIAGNOSIS — I25.10 CORONARY ARTERY DISEASE DUE TO LIPID RICH PLAQUE: ICD-10-CM

## 2025-08-04 DIAGNOSIS — E78.5 HYPERLIPIDEMIA LDL GOAL <70: ICD-10-CM

## 2025-08-04 DIAGNOSIS — I10 PRIMARY HYPERTENSION: ICD-10-CM

## 2025-08-04 LAB
ALBUMIN SERPL-MCNC: 4.5 G/DL (ref 3.4–5)
ALBUMIN/GLOB SERPL: 2 {RATIO} (ref 1.1–2.2)
ALP SERPL-CCNC: 65 U/L (ref 40–129)
ALT SERPL-CCNC: 16 U/L (ref 10–40)
ANION GAP SERPL CALCULATED.3IONS-SCNC: 14 MMOL/L (ref 3–16)
AST SERPL-CCNC: 18 U/L (ref 15–37)
BASOPHILS # BLD: 0 K/UL (ref 0–0.2)
BASOPHILS NFR BLD: 0.7 %
BILIRUB SERPL-MCNC: 0.3 MG/DL (ref 0–1)
BUN SERPL-MCNC: 20 MG/DL (ref 7–20)
CALCIUM SERPL-MCNC: 9.4 MG/DL (ref 8.3–10.6)
CHLORIDE SERPL-SCNC: 100 MMOL/L (ref 99–110)
CHOLEST SERPL-MCNC: 272 MG/DL (ref 0–199)
CO2 SERPL-SCNC: 23 MMOL/L (ref 21–32)
CREAT SERPL-MCNC: 0.7 MG/DL (ref 0.6–1.2)
CREAT UR-MCNC: 54.6 MG/DL (ref 28–259)
DEPRECATED RDW RBC AUTO: 13.3 % (ref 12.4–15.4)
EOSINOPHIL # BLD: 0.1 K/UL (ref 0–0.6)
EOSINOPHIL NFR BLD: 2.2 %
GFR SERPLBLD CREATININE-BSD FMLA CKD-EPI: 89 ML/MIN/{1.73_M2}
GLUCOSE SERPL-MCNC: 135 MG/DL (ref 70–99)
HCT VFR BLD AUTO: 42.8 % (ref 36–48)
HDLC SERPL-MCNC: 56 MG/DL (ref 40–60)
HGB BLD-MCNC: 14.2 G/DL (ref 12–16)
LDLC SERPL CALC-MCNC: ABNORMAL MG/DL
LDLC SERPL-MCNC: 180 MG/DL
LYMPHOCYTES # BLD: 1.2 K/UL (ref 1–5.1)
LYMPHOCYTES NFR BLD: 24.5 %
MCH RBC QN AUTO: 30.5 PG (ref 26–34)
MCHC RBC AUTO-ENTMCNC: 33.3 G/DL (ref 31–36)
MCV RBC AUTO: 91.6 FL (ref 80–100)
MICROALBUMIN UR DL<=1MG/L-MCNC: <1.2 MG/DL
MICROALBUMIN/CREAT UR: NORMAL MG/G (ref 0–30)
MONOCYTES # BLD: 0.4 K/UL (ref 0–1.3)
MONOCYTES NFR BLD: 7.7 %
NEUTROPHILS # BLD: 3.2 K/UL (ref 1.7–7.7)
NEUTROPHILS NFR BLD: 64.9 %
PLATELET # BLD AUTO: 230 K/UL (ref 135–450)
PMV BLD AUTO: 8.8 FL (ref 5–10.5)
POTASSIUM SERPL-SCNC: 4.2 MMOL/L (ref 3.5–5.1)
PROT SERPL-MCNC: 6.8 G/DL (ref 6.4–8.2)
RBC # BLD AUTO: 4.67 M/UL (ref 4–5.2)
SODIUM SERPL-SCNC: 137 MMOL/L (ref 136–145)
TRIGL SERPL-MCNC: 326 MG/DL (ref 0–150)
VLDLC SERPL CALC-MCNC: ABNORMAL MG/DL
WBC # BLD AUTO: 5 K/UL (ref 4–11)

## 2025-08-05 LAB
EST. AVERAGE GLUCOSE BLD GHB EST-MCNC: 159.9 MG/DL
HBA1C MFR BLD: 7.2 %

## 2025-08-06 ENCOUNTER — OFFICE VISIT (OUTPATIENT)
Dept: INTERNAL MEDICINE CLINIC | Age: 75
End: 2025-08-06
Payer: MEDICARE

## 2025-08-06 VITALS
BODY MASS INDEX: 23.49 KG/M2 | DIASTOLIC BLOOD PRESSURE: 70 MMHG | OXYGEN SATURATION: 96 % | WEIGHT: 137.6 LBS | HEIGHT: 64 IN | SYSTOLIC BLOOD PRESSURE: 138 MMHG | HEART RATE: 89 BPM

## 2025-08-06 VITALS
OXYGEN SATURATION: 96 % | HEIGHT: 64 IN | WEIGHT: 137.6 LBS | SYSTOLIC BLOOD PRESSURE: 138 MMHG | BODY MASS INDEX: 23.49 KG/M2 | DIASTOLIC BLOOD PRESSURE: 70 MMHG | HEART RATE: 89 BPM

## 2025-08-06 DIAGNOSIS — R42 VERTIGO: ICD-10-CM

## 2025-08-06 DIAGNOSIS — E78.5 HYPERLIPIDEMIA LDL GOAL <70: ICD-10-CM

## 2025-08-06 DIAGNOSIS — H70.91 MASTOIDITIS, RIGHT: Primary | ICD-10-CM

## 2025-08-06 DIAGNOSIS — E11.65 TYPE 2 DIABETES MELLITUS WITH HYPERGLYCEMIA, WITHOUT LONG-TERM CURRENT USE OF INSULIN (HCC): ICD-10-CM

## 2025-08-06 DIAGNOSIS — I10 PRIMARY HYPERTENSION: ICD-10-CM

## 2025-08-06 DIAGNOSIS — E11.29 MICROALBUMINURIA DUE TO TYPE 2 DIABETES MELLITUS (HCC): ICD-10-CM

## 2025-08-06 DIAGNOSIS — R80.9 MICROALBUMINURIA DUE TO TYPE 2 DIABETES MELLITUS (HCC): ICD-10-CM

## 2025-08-06 DIAGNOSIS — I25.10 CORONARY ARTERY DISEASE DUE TO LIPID RICH PLAQUE: ICD-10-CM

## 2025-08-06 DIAGNOSIS — I25.83 CORONARY ARTERY DISEASE DUE TO LIPID RICH PLAQUE: ICD-10-CM

## 2025-08-06 DIAGNOSIS — Z00.00 MEDICARE ANNUAL WELLNESS VISIT, SUBSEQUENT: Primary | ICD-10-CM

## 2025-08-06 DIAGNOSIS — H91.91 DECREASED HEARING, RIGHT: ICD-10-CM

## 2025-08-06 PROCEDURE — G8399 PT W/DXA RESULTS DOCUMENT: HCPCS | Performed by: INTERNAL MEDICINE

## 2025-08-06 PROCEDURE — 3051F HG A1C>EQUAL 7.0%<8.0%: CPT | Performed by: INTERNAL MEDICINE

## 2025-08-06 PROCEDURE — 1124F ACP DISCUSS-NO DSCNMKR DOCD: CPT | Performed by: INTERNAL MEDICINE

## 2025-08-06 PROCEDURE — G8427 DOCREV CUR MEDS BY ELIG CLIN: HCPCS | Performed by: INTERNAL MEDICINE

## 2025-08-06 PROCEDURE — 1159F MED LIST DOCD IN RCRD: CPT | Performed by: INTERNAL MEDICINE

## 2025-08-06 PROCEDURE — G8420 CALC BMI NORM PARAMETERS: HCPCS | Performed by: INTERNAL MEDICINE

## 2025-08-06 PROCEDURE — 3075F SYST BP GE 130 - 139MM HG: CPT | Performed by: INTERNAL MEDICINE

## 2025-08-06 PROCEDURE — 3078F DIAST BP <80 MM HG: CPT | Performed by: INTERNAL MEDICINE

## 2025-08-06 PROCEDURE — 99215 OFFICE O/P EST HI 40 MIN: CPT | Performed by: INTERNAL MEDICINE

## 2025-08-06 PROCEDURE — 1090F PRES/ABSN URINE INCON ASSESS: CPT | Performed by: INTERNAL MEDICINE

## 2025-08-06 PROCEDURE — 1036F TOBACCO NON-USER: CPT | Performed by: INTERNAL MEDICINE

## 2025-08-06 PROCEDURE — G0439 PPPS, SUBSEQ VISIT: HCPCS | Performed by: INTERNAL MEDICINE

## 2025-08-06 RX ORDER — ROSUVASTATIN CALCIUM 20 MG/1
20 TABLET, COATED ORAL DAILY
Qty: 90 TABLET | Refills: 3 | Status: SHIPPED | OUTPATIENT
Start: 2025-08-06

## 2025-08-06 ASSESSMENT — PATIENT HEALTH QUESTIONNAIRE - PHQ9
SUM OF ALL RESPONSES TO PHQ QUESTIONS 1-9: 0
SUM OF ALL RESPONSES TO PHQ QUESTIONS 1-9: 0
1. LITTLE INTEREST OR PLEASURE IN DOING THINGS: NOT AT ALL
2. FEELING DOWN, DEPRESSED OR HOPELESS: NOT AT ALL
SUM OF ALL RESPONSES TO PHQ QUESTIONS 1-9: 0
SUM OF ALL RESPONSES TO PHQ QUESTIONS 1-9: 0

## 2025-08-09 PROBLEM — M54.2 ACUTE NECK PAIN: Status: RESOLVED | Noted: 2025-04-02 | Resolved: 2025-08-09

## 2025-08-09 PROBLEM — H66.90 SUBACUTE OTITIS MEDIA: Status: RESOLVED | Noted: 2025-04-11 | Resolved: 2025-08-09

## 2025-08-09 PROBLEM — R53.83 FATIGUE: Status: RESOLVED | Noted: 2024-07-28 | Resolved: 2025-08-09

## 2025-08-09 PROBLEM — R35.1 NOCTURIA: Status: RESOLVED | Noted: 2023-08-14 | Resolved: 2025-08-09

## 2025-08-09 PROBLEM — R42 VERTIGO: Status: ACTIVE | Noted: 2025-08-09

## 2025-08-09 PROBLEM — H91.91 DECREASED HEARING, RIGHT: Status: ACTIVE | Noted: 2024-04-15

## 2025-08-09 PROBLEM — H70.91 MASTOIDITIS, RIGHT: Status: ACTIVE | Noted: 2025-08-09

## 2025-08-15 ENCOUNTER — OFFICE VISIT (OUTPATIENT)
Dept: CARDIOLOGY CLINIC | Age: 75
End: 2025-08-15
Payer: MEDICARE

## 2025-08-15 VITALS
HEIGHT: 64 IN | WEIGHT: 137 LBS | HEART RATE: 97 BPM | SYSTOLIC BLOOD PRESSURE: 146 MMHG | BODY MASS INDEX: 23.39 KG/M2 | OXYGEN SATURATION: 97 % | DIASTOLIC BLOOD PRESSURE: 82 MMHG

## 2025-08-15 DIAGNOSIS — K21.9 GASTROESOPHAGEAL REFLUX DISEASE, UNSPECIFIED WHETHER ESOPHAGITIS PRESENT: ICD-10-CM

## 2025-08-15 DIAGNOSIS — E78.5 HYPERLIPIDEMIA LDL GOAL <70: ICD-10-CM

## 2025-08-15 DIAGNOSIS — R00.2 PALPITATION: ICD-10-CM

## 2025-08-15 DIAGNOSIS — I25.10 CORONARY ARTERY DISEASE DUE TO LIPID RICH PLAQUE: Primary | ICD-10-CM

## 2025-08-15 DIAGNOSIS — I10 PRIMARY HYPERTENSION: ICD-10-CM

## 2025-08-15 DIAGNOSIS — Z95.5 S/P CORONARY ARTERY STENT PLACEMENT: ICD-10-CM

## 2025-08-15 DIAGNOSIS — E11.65 TYPE 2 DIABETES MELLITUS WITH HYPERGLYCEMIA, WITHOUT LONG-TERM CURRENT USE OF INSULIN (HCC): ICD-10-CM

## 2025-08-15 DIAGNOSIS — I25.83 CORONARY ARTERY DISEASE DUE TO LIPID RICH PLAQUE: Primary | ICD-10-CM

## 2025-08-15 DIAGNOSIS — R07.9 CHEST PAIN, UNSPECIFIED TYPE: ICD-10-CM

## 2025-08-15 PROCEDURE — 1090F PRES/ABSN URINE INCON ASSESS: CPT | Performed by: STUDENT IN AN ORGANIZED HEALTH CARE EDUCATION/TRAINING PROGRAM

## 2025-08-15 PROCEDURE — G8427 DOCREV CUR MEDS BY ELIG CLIN: HCPCS | Performed by: STUDENT IN AN ORGANIZED HEALTH CARE EDUCATION/TRAINING PROGRAM

## 2025-08-15 PROCEDURE — 3077F SYST BP >= 140 MM HG: CPT | Performed by: STUDENT IN AN ORGANIZED HEALTH CARE EDUCATION/TRAINING PROGRAM

## 2025-08-15 PROCEDURE — 93000 ELECTROCARDIOGRAM COMPLETE: CPT | Performed by: STUDENT IN AN ORGANIZED HEALTH CARE EDUCATION/TRAINING PROGRAM

## 2025-08-15 PROCEDURE — 1124F ACP DISCUSS-NO DSCNMKR DOCD: CPT | Performed by: STUDENT IN AN ORGANIZED HEALTH CARE EDUCATION/TRAINING PROGRAM

## 2025-08-15 PROCEDURE — 1159F MED LIST DOCD IN RCRD: CPT | Performed by: STUDENT IN AN ORGANIZED HEALTH CARE EDUCATION/TRAINING PROGRAM

## 2025-08-15 PROCEDURE — G8420 CALC BMI NORM PARAMETERS: HCPCS | Performed by: STUDENT IN AN ORGANIZED HEALTH CARE EDUCATION/TRAINING PROGRAM

## 2025-08-15 PROCEDURE — 3079F DIAST BP 80-89 MM HG: CPT | Performed by: STUDENT IN AN ORGANIZED HEALTH CARE EDUCATION/TRAINING PROGRAM

## 2025-08-15 PROCEDURE — 99214 OFFICE O/P EST MOD 30 MIN: CPT | Performed by: STUDENT IN AN ORGANIZED HEALTH CARE EDUCATION/TRAINING PROGRAM

## 2025-08-15 PROCEDURE — 1036F TOBACCO NON-USER: CPT | Performed by: STUDENT IN AN ORGANIZED HEALTH CARE EDUCATION/TRAINING PROGRAM

## 2025-08-15 PROCEDURE — G8399 PT W/DXA RESULTS DOCUMENT: HCPCS | Performed by: STUDENT IN AN ORGANIZED HEALTH CARE EDUCATION/TRAINING PROGRAM

## 2025-08-15 PROCEDURE — 3051F HG A1C>EQUAL 7.0%<8.0%: CPT | Performed by: STUDENT IN AN ORGANIZED HEALTH CARE EDUCATION/TRAINING PROGRAM

## 2025-08-15 PROCEDURE — G2211 COMPLEX E/M VISIT ADD ON: HCPCS | Performed by: STUDENT IN AN ORGANIZED HEALTH CARE EDUCATION/TRAINING PROGRAM

## 2025-08-19 ENCOUNTER — TELEPHONE (OUTPATIENT)
Dept: CARDIOLOGY CLINIC | Age: 75
End: 2025-08-19

## (undated) DEVICE — GLIDESHEATH SLENDER ACCESS KIT: Brand: GLIDESHEATH SLENDER

## (undated) DEVICE — CATH BLLN ANGIO 2.75X12MM NC EUPHORIA RX

## (undated) DEVICE — CATHETER GUID 6FR L150CM RAP EXCHG L25CM TIP 5.1FR PUSHROD

## (undated) DEVICE — Device: Brand: PROWATER

## (undated) DEVICE — Device: Brand: NOMOLINE™ LH ADULT NASAL CO2 CANNULA WITH O2 4M

## (undated) DEVICE — CATHETER GUID 6FR L100CM GRN PTFE JR4 TRUELUMEN HYBRID

## (undated) DEVICE — DRAPE,ANGIO,BRACH,STERILE,38X44: Brand: MEDLINE

## (undated) DEVICE — Device: Brand: ASAHI SION BLUE

## (undated) DEVICE — CATH BLLN ANGIO 2.50X15MM SC EUPHORA RX

## (undated) DEVICE — CATH LAB PACK: Brand: MEDLINE INDUSTRIES, INC.

## (undated) DEVICE — KIT AT-X65 ANGIOTOUCH HAND CONTROLLER

## (undated) DEVICE — CATHETER DIAG L100CM DIA5.2FR 0.038IN POLYUR COR JR4 STD

## (undated) DEVICE — 260 CM J TIP WIRE .035

## (undated) DEVICE — CATHETER 5FR JL3.5 CORDIS 100CM

## (undated) DEVICE — PAD, DEFIB, ADULT, RADIOTRANS, PHYSIO: Brand: MEDLINE